# Patient Record
Sex: FEMALE | Race: WHITE | NOT HISPANIC OR LATINO | Employment: FULL TIME | URBAN - METROPOLITAN AREA
[De-identification: names, ages, dates, MRNs, and addresses within clinical notes are randomized per-mention and may not be internally consistent; named-entity substitution may affect disease eponyms.]

---

## 2017-02-17 ENCOUNTER — HOSPITAL ENCOUNTER (OUTPATIENT)
Dept: LAB | Facility: MEDICAL CENTER | Age: 57
End: 2017-02-17
Attending: NURSE PRACTITIONER
Payer: COMMERCIAL

## 2017-02-17 DIAGNOSIS — E03.8 OTHER SPECIFIED HYPOTHYROIDISM: ICD-10-CM

## 2017-02-17 DIAGNOSIS — R53.83 OTHER FATIGUE: ICD-10-CM

## 2017-02-17 DIAGNOSIS — Z00.00 PREVENTATIVE HEALTH CARE: ICD-10-CM

## 2017-02-17 LAB
25(OH)D3 SERPL-MCNC: 10 NG/ML (ref 30–100)
ALBUMIN SERPL BCP-MCNC: 4 G/DL (ref 3.2–4.9)
ALBUMIN/GLOB SERPL: 1.4 G/DL
ALP SERPL-CCNC: 74 U/L (ref 30–99)
ALT SERPL-CCNC: 14 U/L (ref 2–50)
ANION GAP SERPL CALC-SCNC: 7 MMOL/L (ref 0–11.9)
AST SERPL-CCNC: 19 U/L (ref 12–45)
BASOPHILS # BLD AUTO: 0.06 K/UL (ref 0–0.12)
BASOPHILS NFR BLD AUTO: 1 % (ref 0–1.8)
BILIRUB SERPL-MCNC: 0.8 MG/DL (ref 0.1–1.5)
BUN SERPL-MCNC: 15 MG/DL (ref 8–22)
CALCIUM SERPL-MCNC: 9.1 MG/DL (ref 8.5–10.5)
CHLORIDE SERPL-SCNC: 103 MMOL/L (ref 96–112)
CHOLEST SERPL-MCNC: 234 MG/DL (ref 100–199)
CO2 SERPL-SCNC: 28 MMOL/L (ref 20–33)
CORTIS SERPL-MCNC: 8.6 UG/DL (ref 0–23)
CREAT SERPL-MCNC: 0.83 MG/DL (ref 0.5–1.4)
EOSINOPHIL # BLD: 0.14 K/UL (ref 0–0.51)
EOSINOPHIL NFR BLD AUTO: 2.3 % (ref 0–6.9)
ERYTHROCYTE [DISTWIDTH] IN BLOOD BY AUTOMATED COUNT: 42.1 FL (ref 35.9–50)
ESTRADIOL SERPL-MCNC: <20 PG/ML
FERRITIN SERPL-MCNC: 56.3 NG/ML (ref 10–291)
GLOBULIN SER CALC-MCNC: 2.9 G/DL (ref 1.9–3.5)
GLUCOSE SERPL-MCNC: 81 MG/DL (ref 65–99)
HCT VFR BLD AUTO: 48.5 % (ref 37–47)
HDLC SERPL-MCNC: 71 MG/DL
HGB BLD-MCNC: 16.5 G/DL (ref 12–16)
IMM GRANULOCYTES # BLD AUTO: 0.02 K/UL (ref 0–0.11)
IMM GRANULOCYTES NFR BLD AUTO: 0.3 % (ref 0–0.9)
IRON SATN MFR SERPL: 40 % (ref 15–55)
IRON SERPL-MCNC: 144 UG/DL (ref 40–170)
LDLC SERPL CALC-MCNC: 148 MG/DL
LYMPHOCYTES # BLD: 1.97 K/UL (ref 1–4.8)
LYMPHOCYTES NFR BLD AUTO: 31.7 % (ref 22–41)
MCH RBC QN AUTO: 31.8 PG (ref 27–33)
MCHC RBC AUTO-ENTMCNC: 34 G/DL (ref 33.6–35)
MCV RBC AUTO: 93.4 FL (ref 81.4–97.8)
MONOCYTES # BLD: 0.54 K/UL (ref 0–0.85)
MONOCYTES NFR BLD AUTO: 8.7 % (ref 0–13.4)
NEUTROPHILS # BLD: 3.48 K/UL (ref 2–7.15)
NEUTROPHILS NFR BLD AUTO: 56 % (ref 44–72)
NRBC # BLD AUTO: 0 K/UL
NRBC BLD-RTO: 0 /100 WBC
PLATELET # BLD AUTO: 345 K/UL (ref 164–446)
PMV BLD AUTO: 9.7 FL (ref 9–12.9)
POTASSIUM SERPL-SCNC: 4 MMOL/L (ref 3.6–5.5)
PROT SERPL-MCNC: 6.9 G/DL (ref 6–8.2)
RBC # BLD AUTO: 5.19 M/UL (ref 4.2–5.4)
SODIUM SERPL-SCNC: 138 MMOL/L (ref 135–145)
T4 FREE SERPL-MCNC: 1.27 NG/DL (ref 0.53–1.43)
TIBC SERPL-MCNC: 360 UG/DL (ref 250–450)
TRIGL SERPL-MCNC: 73 MG/DL (ref 0–149)
TSH SERPL DL<=0.005 MIU/L-ACNC: 1.78 UIU/ML (ref 0.3–3.7)
WBC # BLD AUTO: 6.2 K/UL (ref 4.8–10.8)

## 2017-02-17 PROCEDURE — 83550 IRON BINDING TEST: CPT

## 2017-02-17 PROCEDURE — 84439 ASSAY OF FREE THYROXINE: CPT

## 2017-02-17 PROCEDURE — 82626 DEHYDROEPIANDROSTERONE: CPT

## 2017-02-17 PROCEDURE — 85025 COMPLETE CBC W/AUTO DIFF WBC: CPT

## 2017-02-17 PROCEDURE — 82670 ASSAY OF TOTAL ESTRADIOL: CPT

## 2017-02-17 PROCEDURE — 82306 VITAMIN D 25 HYDROXY: CPT

## 2017-02-17 PROCEDURE — 36415 COLL VENOUS BLD VENIPUNCTURE: CPT

## 2017-02-17 PROCEDURE — 82728 ASSAY OF FERRITIN: CPT

## 2017-02-17 PROCEDURE — 84443 ASSAY THYROID STIM HORMONE: CPT

## 2017-02-17 PROCEDURE — 80053 COMPREHEN METABOLIC PANEL: CPT

## 2017-02-17 PROCEDURE — 83540 ASSAY OF IRON: CPT

## 2017-02-17 PROCEDURE — 82533 TOTAL CORTISOL: CPT

## 2017-02-17 PROCEDURE — 80061 LIPID PANEL: CPT

## 2017-02-21 ENCOUNTER — APPOINTMENT (OUTPATIENT)
Dept: MEDICAL GROUP | Facility: MEDICAL CENTER | Age: 57
End: 2017-02-21
Payer: COMMERCIAL

## 2017-02-21 LAB — DHEA SERPL-MCNC: 0.52 NG/ML (ref 0.63–4.7)

## 2017-03-07 ENCOUNTER — OFFICE VISIT (OUTPATIENT)
Dept: MEDICAL GROUP | Facility: LAB | Age: 57
End: 2017-03-07
Payer: COMMERCIAL

## 2017-03-07 VITALS
BODY MASS INDEX: 27.52 KG/M2 | DIASTOLIC BLOOD PRESSURE: 80 MMHG | RESPIRATION RATE: 12 BRPM | WEIGHT: 140.2 LBS | SYSTOLIC BLOOD PRESSURE: 140 MMHG | TEMPERATURE: 99.3 F | HEART RATE: 75 BPM | HEIGHT: 60 IN | OXYGEN SATURATION: 97 %

## 2017-03-07 DIAGNOSIS — Z01.419 ENCOUNTER FOR GYNECOLOGICAL EXAMINATION: ICD-10-CM

## 2017-03-07 DIAGNOSIS — E03.8 OTHER SPECIFIED HYPOTHYROIDISM: ICD-10-CM

## 2017-03-07 DIAGNOSIS — E55.9 VITAMIN D DEFICIENCY: ICD-10-CM

## 2017-03-07 PROCEDURE — 99396 PREV VISIT EST AGE 40-64: CPT | Performed by: NURSE PRACTITIONER

## 2017-03-07 NOTE — MR AVS SNAPSHOT
Jess Root   3/7/2017 10:40 AM   Office Visit   MRN: [id number]    Department:  [facility]   Dept Phone:  [phone]    Description:  Female : 1960   Provider:  BESSY Dorsey           Reason for Visit     Annual Exam physical and pap    Results lab work      Allergies as of 3/7/2017     Allergen Noted Reactions    Other Misc 10/04/2008       Anesthesia-- pseudocholinesterace      You were diagnosed with     Encounter for gynecological examination   [1970323]       Other specified hypothyroidism   [7629779]       Vitamin D deficiency   [1762296]         Vital Signs     Blood Pressure Pulse Temperature Respirations Height Weight    140/80 mmHg 75 37.4 °C (99.3 °F) 12 1.524 m (5') 63.594 kg (140 lb 3.2 oz)    Body Mass Index Oxygen Saturation                27.38 kg/m2 97%          Basic Information     Date Of Birth Sex Race Ethnicity Preferred Language    1960 Female White Non- English      Problem List              ICD-10-CM Priority Class Noted - Resolved    Hypothyroidism E03.9   2009 - Present    Varicose veins I86.8   2009 - Present    Superficial thrombophlebitis I80.9   2010 - Present    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 Review all of your home medications and newly ordered medications with your provider and/or pharmacist. Follow medication instructions as directed by your provider and/or pharmacist. Please keep your medication list with you and share with your provider. Update the  information when medications are discontinued, doses are changed, or new medications (including over-the-counter products) are added; and carry medication information at all times in the event of emergency situations     Allergies:  OTHER MISC - (reactions not documented)               Medications  Valid as of: March 07, 2017 - 11:45 AM    Generic Name Brand Name Tablet Size Instructions for use    Fluocinonide (Cream) LIDEX 0.05 % Apply  to affected area(s) 2 times a day. For 14 days        Fluticasone Propionate (Suspension) FLONASE 50 MCG/ACT Spray 2 Sprays in nose every day. Each Nostril        Ketoconazole (Cream) NIZORAL 2 % Apply 1 Application to affected area(s) every day.        Levothyroxine Sodium (Tab) SYNTHROID 125 MCG TAKE 1 TABLET BY MOUTH ONCE DAILY MONDAY-FRIDAY AND 1/2 TABLET ONCE DAILY ON SATURDAYAND SUNDAY  GENERIC FOR SYNTHROID        ValACYclovir HCl (Tab) VALTREX 500 MG Take 1 Tab by mouth 2 times a day. Prn outbreak        .                 Medicines prescribed today were sent to:     [id number]  [place] - [address]    [address] [facility] [address]    Phone: [phone] Fax: [phone]    Open 24 Hours?: No      Medication refill instructions:       If your prescription bottle indicates you have medication refills left, it is not necessary to call your provider’s office. Please contact your pharmacy and they will refill your medication.    If your prescription bottle indicates you do not have any refills left, you may request refills at any time through one of the following ways: The online [employer] system (except Urgent Care), by calling your provider’s office, or by asking your pharmacy to contact your provider’s office with a refill request. Medication refills are processed only during regular business hours and may not be available until the next business day. Your provider may request additional information or to have a follow-up visit with you prior to refilling your  medication.   *Please Note: Medication refills are assigned a new Rx number when refilled electronically. Your pharmacy may indicate that no refills were authorized even though a new prescription for the same medication is available at the pharmacy. Please request the medicine by name with the pharmacy before contacting your provider for a refill.        Your To Do List     Future Labs/Procedures Complete By Expires    FREE THYROXINE  As directed 3/7/2018    TSH  As directed 3/7/2018    VITAMIN D,25 HYDROXY  As directed 3/7/2018         MyChart Status: Patient Declined

## 2017-03-07 NOTE — PROGRESS NOTES
Chief Complaint   Patient presents with   • Annual Exam     physical and pap   • Results     lab work       HPI:  Jess is a 56 y.o.  female who presents for annual exam. Generally the patient is feeling good. She has no complaints or concerns. She would like to review her lab work today, stating that she always feels a lot better on higher dosages of thyroid medication. She is currently taking 125 µg levothyroxine 5 days per week, one half tablet 2 days per week. She describes a mental fog unless she is taking on 137 µg of levothyroxine. She denies constipation, hair or skin changes. She's not on any hormone placement therapy but requested to have her DHEA level checked after reading that she should take this over-the-counter postmenopausally.  Regarding her health maintenance:   Last pap: prior to 2006 - complete hysterectomy due to excessive uterine bleeding  Abnormal Pap hx: a few in her past, not in the past 15-20 yrs  Last Mammo: due, plans on scheduling but has been delayed because of a torn meniscus  Last colonoscopy: As above, plans on scheduling that has been delayed  Bone density test:N\A   Last Lab: Done, would like to review today  Last Td:current   Influenza vaccination:current   Pneumococcal vaccination:not applicable   Zostavax:not applicable   Hx STD''s: no   Regular exercise: yes      meds:   Current Outpatient Prescriptions   Medication Sig Dispense Refill   • valacyclovir (VALTREX) 500 MG Tab Take 1 Tab by mouth 2 times a day. Prn outbreak 30 Tab 11   • fluticasone (FLONASE) 50 MCG/ACT nasal spray Spray 2 Sprays in nose every day. Each Nostril 1 Bottle 0   • levothyroxine (SYNTHROID) 125 MCG Tab TAKE 1 TABLET BY MOUTH ONCE DAILY MONDAY-FRIDAY AND 1/2 TABLET ONCE DAILY ON SATURDAYAND SUNDAY  GENERIC FOR SYNTHROID 30 Tab 2   • ketoconazole (NIZORAL) 2 % Cream Apply 1 Application to affected area(s) every day. 1 Tube 2   • FLUOCINONIDE 0.05 % EX CREA Apply  to affected area(s) 2 times a day. For 14  days 45 g 0     No current facility-administered medications for this visit.       Allergies: No Known Allergies    family:   Family History   Problem Relation Age of Onset   • Diabetes Mother    • Heart Disease Mother        social hx:   Social History     Social History   • Marital Status: Legally      Spouse Name: N/A   • Number of Children: N/A   • Years of Education: N/A     Occupational History   • Not on file.     Social History Main Topics   • Smoking status: Never Smoker    • Smokeless tobacco: Not on file   • Alcohol Use: No   • Drug Use: No   • Sexual Activity: Not on file      Comment: , two kids     Other Topics Concern   • Not on file     Social History Narrative   • No narrative on file       ROS:  No fever, chills, sweats.   No polydipsia, polyuria, temperature intolerance, significant weight changes   No visual changes, blurred vision.  No chest pain, palpitations, peripheral swelling   No chronic cough, shortness of breath, dyspnea with exertion.   No dysphagia, odynophagia, black or bloody stools.   No abdominal pain, nausea, persistent diarrhea, constipation   No dysuria, hematuria, incontinence. Denies nocturia  No rash, pruritis, pigment changes.   No focal weakness, syncope, headache, confusion, persistent numbness.   All other systems are reviewed and negative.    PHYSICAL EXAMINATION:  Blood pressure 140/80, pulse 75, temperature 37.4 °C (99.3 °F), resp. rate 12, height 1.524 m (5'), weight 63.594 kg (140 lb 3.2 oz), SpO2 97 %.  General appearance:healthy, well developed, well nourished  Psych: alert, no distress, cooperative  Eyes: EOM's normal, pupils equal, round, reactive to light  ENT: Ears: external ears normal to inspection and palpation, TM's clear, Nose/Sinuses: nose shows no deformity, asymmetry, or inflammation  Neck: no asymmetry, masses. Thyroid surgically absent, no masses felt. No neck tenderness.  Lungs:chest symmetric with normal A/P diameter, no chest  deformities noted, normal respiratory rate and rhythm  Cardiovascular:regular rate and rhythm, S1 normal  Breasts: normal in size and symmetry, skin normal, physiologic fibronodularity  Abdomen: umbilicus normal, no masses palpable, no organomegaly  Musculoskeletal:ROM of all joints is normal but she experiences pain with flexion of both knees.  Lymphatic: None significantly enlarged  Skin: no rash, no edema  Neuro: mental status intact, cranial nerves 2-12 intact  Pelvic: external genitalia normal, cervix and uterus surgically absent, adnexa without masses or tenderness, vaginal mucosa atrophic      ASSESSMENT/PLAN:  1.annual physical exam: HCM:   breast exams done.  BSE technique reviewed and patient encouraged to perform self-exam monthly.   Encourage monthly self breast exam  Encourage daily exercise for at least 30 minutes  Recommend mammogram and colonoscopy which she plans on scheduling ASAP. DEXA scan next year, she feels overwhelmed that she  Recommend 1500 mg Calcium with 600 units vit d daily.    Follow-up yearly for annual physical exam, Pap smear is no longer indicated.  Labs done - increase vit D to 3000 IU daily  Hypothyroidism: Okay to slightly increase her replacement by taking 125 µg 6 days per week, one half tablet one day per week and she is willing to recheck her lab work in 6 weeks. Reviewed signs and symptoms of hyperthyroidism with her.  Hyperlipidemia: She completely refuses discussion of a statin. She verbalized understanding of the increased risk of coronary artery disease and early death. She is going to try and increase her fiber intake and is willing to recheck her lipids in 6 months.

## 2017-05-18 RX ORDER — LEVOTHYROXINE SODIUM 0.12 MG/1
TABLET ORAL
Qty: 30 TAB | Refills: 2 | Status: SHIPPED | OUTPATIENT
Start: 2017-05-18 | End: 2017-09-14 | Stop reason: SDUPTHER

## 2017-05-18 NOTE — TELEPHONE ENCOUNTER
Was the patient seen in the last year in this department? Yes     Does patient have an active prescription for medications requested? No     Received Request Via: Pharmacy     Last visit:3/7/17

## 2017-09-14 RX ORDER — LEVOTHYROXINE SODIUM 0.12 MG/1
TABLET ORAL
Qty: 30 TAB | Refills: 2 | Status: SHIPPED | OUTPATIENT
Start: 2017-09-14 | End: 2018-01-22 | Stop reason: SDUPTHER

## 2017-09-20 ENCOUNTER — HOSPITAL ENCOUNTER (OUTPATIENT)
Dept: LAB | Facility: MEDICAL CENTER | Age: 57
End: 2017-09-20
Attending: ORTHOPAEDIC SURGERY
Payer: COMMERCIAL

## 2017-09-20 LAB — HCT VFR BLD AUTO: 45.9 % (ref 37–47)

## 2017-09-20 PROCEDURE — 85014 HEMATOCRIT: CPT

## 2017-09-20 PROCEDURE — 36415 COLL VENOUS BLD VENIPUNCTURE: CPT

## 2018-01-13 ENCOUNTER — OFFICE VISIT (OUTPATIENT)
Dept: URGENT CARE | Facility: PHYSICIAN GROUP | Age: 58
End: 2018-01-13
Payer: COMMERCIAL

## 2018-01-13 VITALS
BODY MASS INDEX: 29.84 KG/M2 | RESPIRATION RATE: 18 BRPM | SYSTOLIC BLOOD PRESSURE: 124 MMHG | WEIGHT: 152 LBS | OXYGEN SATURATION: 92 % | HEART RATE: 101 BPM | DIASTOLIC BLOOD PRESSURE: 84 MMHG | HEIGHT: 60 IN | TEMPERATURE: 98.6 F

## 2018-01-13 DIAGNOSIS — J03.90 TONSILLITIS WITH EXUDATE: ICD-10-CM

## 2018-01-13 PROCEDURE — 99214 OFFICE O/P EST MOD 30 MIN: CPT | Performed by: PHYSICIAN ASSISTANT

## 2018-01-13 RX ORDER — AZITHROMYCIN 250 MG/1
TABLET, FILM COATED ORAL
Qty: 6 TAB | Refills: 0 | Status: SHIPPED | OUTPATIENT
Start: 2018-01-13 | End: 2018-02-08

## 2018-01-13 ASSESSMENT — ENCOUNTER SYMPTOMS
TROUBLE SWALLOWING: 1
WHEEZING: 0
HEADACHES: 1
CARDIOVASCULAR NEGATIVE: 1
SWOLLEN GLANDS: 1
CHILLS: 1
SORE THROAT: 1
GASTROINTESTINAL NEGATIVE: 1
DIZZINESS: 0
SINUS PAIN: 1
MUSCULOSKELETAL NEGATIVE: 1
COUGH: 1
SPUTUM PRODUCTION: 1
FEVER: 1
SHORTNESS OF BREATH: 0

## 2018-01-13 NOTE — LETTER
January 13, 2018         Patient: Jess Root   YOB: 1960   Date of Visit: 1/13/2018           To Whom it May Concern:    Jess Root was seen in my clinic on 1/13/2018. She may return to work on Mon. Jan 15th.    If you have any questions or concerns, please don't hesitate to call.        Sincerely,           Tu Russell P.A.-C.  Electronically Signed

## 2018-01-13 NOTE — PROGRESS NOTES
Subjective:      Jess Root is a 57 y.o. female who presents with Cough (C/o RT side sore throat, runny nose, nasal congestion, bilateral ear pain, productive cough, back discomfort from cough x2 days)            Pharyngitis    This is a new problem. The current episode started in the past 7 days. The problem has been unchanged. The pain is worse on the right side. Associated symptoms include congestion, coughing, ear pain (Bilateral), headaches, swollen glands and trouble swallowing. Pertinent negatives include no shortness of breath. She has tried gargles for the symptoms. The treatment provided mild relief.       PMH:  has a past medical history of HSV-2 infection; Hyperlipidemia; Osteopenia; and Thyroid disease (nodules - thyroidectomy).  MEDS:   Current Outpatient Prescriptions:   •  Homeopathic Products (ZICAM COLD REMEDY PO), Take  by mouth., Disp: , Rfl:   •  levothyroxine (SYNTHROID) 125 MCG Tab, TAKE 1 TABLET BY MOUTH ONCE DAILY MONDAY-FRIDAY AND 1/2 TABLET ONCE DAILY ON SATURDAYAND SUNDAY  GENERIC FOR SYNTHROID, Disp: 30 Tab, Rfl: 2  •  valacyclovir (VALTREX) 500 MG Tab, Take 1 Tab by mouth 2 times a day. Prn outbreak, Disp: 30 Tab, Rfl: 11  •  fluticasone (FLONASE) 50 MCG/ACT nasal spray, Spray 2 Sprays in nose every day. Each Nostril, Disp: 1 Bottle, Rfl: 0  •  ketoconazole (NIZORAL) 2 % Cream, Apply 1 Application to affected area(s) every day., Disp: 1 Tube, Rfl: 2  •  FLUOCINONIDE 0.05 % EX CREA, Apply  to affected area(s) 2 times a day. For 14 days, Disp: 45 g, Rfl: 0  ALLERGIES:   Allergies   Allergen Reactions   • Other Misc      Anesthesia-- pseudocholinesterace     SURGHX:   Past Surgical History:   Procedure Laterality Date   • ABDOMINAL HYSTERECTOMY TOTAL  205   • GYN SURGERY     • THYROIDECTOMY     • VEIN STRIPPING       SOCHX:  reports that she has never smoked. She has never used smokeless tobacco. She reports that she does not drink alcohol or use drugs.  FH: family history includes  Diabetes in her mother; Heart Disease in her mother.      Review of Systems   Constitutional: Positive for chills and fever.   HENT: Positive for congestion, ear pain (Bilateral), sinus pain, sore throat and trouble swallowing.    Respiratory: Positive for cough and sputum production. Negative for shortness of breath and wheezing.    Cardiovascular: Negative.    Gastrointestinal: Negative.    Musculoskeletal: Negative.    Neurological: Positive for headaches. Negative for dizziness.       Medications, Allergies, and current problem list reviewed today in Epic     Objective:     /84   Pulse (!) 101   Temp 37 °C (98.6 °F)   Resp 18   Ht 1.524 m (5')   Wt 68.9 kg (152 lb)   SpO2 92%   BMI 29.69 kg/m²      Physical Exam   Constitutional: She is oriented to person, place, and time. She appears well-developed and well-nourished. No distress.   HENT:   Head: Normocephalic and atraumatic.   Right Ear: Hearing, tympanic membrane and external ear normal.   Left Ear: Hearing, tympanic membrane and external ear normal.   Nose: Nose normal.   Mouth/Throat: Normal dentition. No dental caries. Oropharyngeal exudate, posterior oropharyngeal edema and posterior oropharyngeal erythema present.   Eyes: Conjunctivae are normal. Right eye exhibits no discharge. Left eye exhibits no discharge.   Neck: Normal range of motion. Neck supple.   Cardiovascular: Normal rate, regular rhythm and normal heart sounds.    Pulmonary/Chest: Effort normal and breath sounds normal. No respiratory distress. She has no wheezes.   Lymphadenopathy:        Head (right side): Submandibular adenopathy present.        Head (left side): Submandibular adenopathy present.     She has cervical adenopathy.        Right cervical: No superficial cervical adenopathy present.       Left cervical: No superficial cervical adenopathy present.   Neurological: She is alert and oriented to person, place, and time.   Skin: Skin is warm and dry. She is not  diaphoretic. No cyanosis. Nails show no clubbing.   Psychiatric: She has a normal mood and affect. Her behavior is normal. Judgment and thought content normal.   Nursing note and vitals reviewed.              Assessment/Plan:     1. Tonsillitis with exudate  azithromycin (ZITHROMAX) 250 MG Tab     With sinus component  Take full course of antibiotic  Tylenol and Motrin for fever and pain  OTC meds as discussed including oral decongestant if tolerated  Increase fluids and rest  Nasal spray, nasal wash, Linda pot    Return to clinic or go to ED if symptoms worsen or persist. Indications for ED discussed at length. Patient voices understanding. Follow-up with your primary care provider in 3-5 days. Red flags discussed.    Please note that this dictation was created using voice recognition software. I have made every reasonable attempt to correct obvious errors, but I expect that there are errors of grammar and possibly content that I did not discover before finalizing the note.

## 2018-01-22 RX ORDER — LEVOTHYROXINE SODIUM 0.12 MG/1
TABLET ORAL
Qty: 30 TAB | Refills: 0 | Status: SHIPPED | OUTPATIENT
Start: 2018-01-22 | End: 2018-03-04 | Stop reason: SDUPTHER

## 2018-02-08 ENCOUNTER — OFFICE VISIT (OUTPATIENT)
Dept: URGENT CARE | Facility: PHYSICIAN GROUP | Age: 58
End: 2018-02-08
Payer: COMMERCIAL

## 2018-02-08 ENCOUNTER — APPOINTMENT (OUTPATIENT)
Dept: RADIOLOGY | Facility: IMAGING CENTER | Age: 58
End: 2018-02-08
Attending: NURSE PRACTITIONER
Payer: COMMERCIAL

## 2018-02-08 VITALS
BODY MASS INDEX: 29.8 KG/M2 | HEIGHT: 60 IN | WEIGHT: 151.8 LBS | TEMPERATURE: 99 F | RESPIRATION RATE: 12 BRPM | SYSTOLIC BLOOD PRESSURE: 120 MMHG | OXYGEN SATURATION: 94 % | DIASTOLIC BLOOD PRESSURE: 80 MMHG | HEART RATE: 94 BPM

## 2018-02-08 DIAGNOSIS — R05.3 PERSISTENT COUGH FOR 3 WEEKS OR LONGER: ICD-10-CM

## 2018-02-08 DIAGNOSIS — R05.3 PERSISTENT COUGH FOR 3 WEEKS OR LONGER: Primary | ICD-10-CM

## 2018-02-08 PROCEDURE — 99214 OFFICE O/P EST MOD 30 MIN: CPT | Performed by: NURSE PRACTITIONER

## 2018-02-08 PROCEDURE — 71046 X-RAY EXAM CHEST 2 VIEWS: CPT | Mod: TC | Performed by: NURSE PRACTITIONER

## 2018-02-08 RX ORDER — FLUTICASONE PROPIONATE 50 MCG
2 SPRAY, SUSPENSION (ML) NASAL DAILY
Qty: 16 G | Refills: 1 | Status: SHIPPED | OUTPATIENT
Start: 2018-02-08 | End: 2019-11-19

## 2018-02-08 RX ORDER — AZITHROMYCIN 250 MG/1
TABLET, FILM COATED ORAL
Qty: 6 TAB | Refills: 0 | Status: SHIPPED | OUTPATIENT
Start: 2018-02-08 | End: 2019-11-19

## 2018-02-08 ASSESSMENT — ENCOUNTER SYMPTOMS
COUGH: 1
SORE THROAT: 0
FEVER: 1
NAUSEA: 0
DIARRHEA: 0
SHORTNESS OF BREATH: 1
VOMITING: 0
CHILLS: 1
MYALGIAS: 1
WEAKNESS: 1
SPUTUM PRODUCTION: 1

## 2018-02-08 ASSESSMENT — COPD QUESTIONNAIRES: COPD: 0

## 2018-02-08 NOTE — PROGRESS NOTES
Subjective:      Jess Root is a 57 y.o. female who presents with Cough (headache, upper chest pressure, congestion, phlem, x3 weeks )            Medications, Allergies and Prior Medical Hx reviewed and updated in [facility].with patient/family today         Cough   This is a new problem. The current episode started 1 to 4 weeks ago (4 wks). The cough is productive of sputum. Associated symptoms include chills, a fever, myalgias, nasal congestion and shortness of breath. Pertinent negatives include no ear pain or sore throat. She has tried OTC cough suppressant for the symptoms. The treatment provided no relief. There is no history of asthma, bronchitis, COPD or pneumonia.       Review of Systems   Constitutional: Positive for chills, fever and malaise/fatigue.   HENT: Positive for congestion. Negative for ear discharge, ear pain and sore throat.    Respiratory: Positive for cough, sputum production and shortness of breath.    Gastrointestinal: Negative for diarrhea, nausea and vomiting.   Musculoskeletal: Positive for myalgias.   Neurological: Positive for weakness.          Objective:     /80   Pulse 94   Temp 37.2 °C (99 °F)   Resp 12   Ht 1.524 m (5')   Wt 68.9 kg (151 lb 12.8 oz)   SpO2 94%   BMI 29.65 kg/m²      Physical Exam   Constitutional: She appears well-developed and well-nourished. No distress.   HENT:   Head: Normocephalic and atraumatic.   Right Ear: Tympanic membrane and ear canal normal.   Left Ear: Tympanic membrane and ear canal normal.   Nose: Rhinorrhea present.   Mouth/Throat: Uvula is midline and mucous membranes are normal. No trismus in the jaw. No uvula swelling. Posterior oropharyngeal edema and posterior oropharyngeal erythema present. No oropharyngeal exudate.   Eyes: Conjunctivae are normal. Pupils are equal, round, and reactive to light.   Neck: Neck supple.   Cardiovascular: Normal rate, regular rhythm and normal heart sounds.    Pulmonary/Chest: Effort normal and breath  sounds normal. No respiratory distress. She has no wheezes.   Lymphadenopathy:     She has cervical adenopathy.   Neurological: She is alert.   Skin: Skin is warm and dry. Capillary refill takes less than 2 seconds.   Psychiatric: She has a normal mood and affect. Her behavior is normal.   Vitals reviewed.              Assessment/Plan:     1. Persistent cough for 3 weeks or longer  DX-CHEST-2 VIEWS    fluticasone (FLONASE) 50 MCG/ACT nasal spray    azithromycin (ZITHROMAX) 250 MG Tab       Xray of chest -  Reviewed film and radiology interpretation:   1. No active cardiopulmonary abnormalities are identified.    Rest, Fluids, tylenol, ibuprofen, humidified air, and otc cough meds  Pt will go to the ER for worsening or changing symptoms as discussed, follow-up with your primary care provider or return here if not improving in 7 days   Discharge instructions discussed with pt/family who verbalize understanding and agreement with poc

## 2018-03-05 RX ORDER — LEVOTHYROXINE SODIUM 0.12 MG/1
TABLET ORAL
Qty: 15 TAB | Refills: 0 | Status: SHIPPED | OUTPATIENT
Start: 2018-03-05 | End: 2018-04-05 | Stop reason: SDUPTHER

## 2018-03-05 NOTE — TELEPHONE ENCOUNTER
Was the patient seen in the last year in this department? No 12/16    Does patient have an active prescription for medications requested? No     Received Request Via: Pharmacy

## 2018-04-02 ENCOUNTER — HOSPITAL ENCOUNTER (OUTPATIENT)
Dept: LAB | Facility: MEDICAL CENTER | Age: 58
End: 2018-04-02
Attending: NURSE PRACTITIONER
Payer: COMMERCIAL

## 2018-04-02 DIAGNOSIS — E03.8 OTHER SPECIFIED HYPOTHYROIDISM: ICD-10-CM

## 2018-04-02 DIAGNOSIS — E55.9 VITAMIN D DEFICIENCY: ICD-10-CM

## 2018-04-02 LAB
25(OH)D3 SERPL-MCNC: 21 NG/ML (ref 30–100)
T4 FREE SERPL-MCNC: 1.24 NG/DL (ref 0.53–1.43)
TSH SERPL DL<=0.005 MIU/L-ACNC: 2.74 UIU/ML (ref 0.38–5.33)

## 2018-04-02 PROCEDURE — 84443 ASSAY THYROID STIM HORMONE: CPT

## 2018-04-02 PROCEDURE — 84439 ASSAY OF FREE THYROXINE: CPT

## 2018-04-02 PROCEDURE — 82306 VITAMIN D 25 HYDROXY: CPT

## 2018-04-02 PROCEDURE — 36415 COLL VENOUS BLD VENIPUNCTURE: CPT

## 2018-04-05 RX ORDER — LEVOTHYROXINE SODIUM 0.12 MG/1
TABLET ORAL
Qty: 15 TAB | Refills: 0 | Status: SHIPPED | OUTPATIENT
Start: 2018-04-05 | End: 2018-04-19 | Stop reason: SDUPTHER

## 2018-04-09 ENCOUNTER — TELEPHONE (OUTPATIENT)
Dept: MEDICAL GROUP | Facility: LAB | Age: 58
End: 2018-04-09

## 2018-04-09 NOTE — TELEPHONE ENCOUNTER
----- Message from BESSY Rodas sent at 4/3/2018  5:31 PM PDT -----  Please let stone know that her thyroid lab work looks good.  Vit D is low - ask her to take 2000 IU Vit d daily.

## 2018-04-19 ENCOUNTER — OFFICE VISIT (OUTPATIENT)
Dept: MEDICAL GROUP | Facility: LAB | Age: 58
End: 2018-04-19
Payer: COMMERCIAL

## 2018-04-19 VITALS
RESPIRATION RATE: 12 BRPM | DIASTOLIC BLOOD PRESSURE: 92 MMHG | HEIGHT: 60 IN | TEMPERATURE: 98.9 F | OXYGEN SATURATION: 96 % | WEIGHT: 147 LBS | SYSTOLIC BLOOD PRESSURE: 120 MMHG | BODY MASS INDEX: 28.86 KG/M2 | HEART RATE: 78 BPM

## 2018-04-19 DIAGNOSIS — E55.9 VITAMIN D DEFICIENCY: ICD-10-CM

## 2018-04-19 DIAGNOSIS — E03.8 OTHER SPECIFIED HYPOTHYROIDISM: ICD-10-CM

## 2018-04-19 DIAGNOSIS — Z12.11 SPECIAL SCREENING FOR MALIGNANT NEOPLASM OF COLON: ICD-10-CM

## 2018-04-19 DIAGNOSIS — I83.90 VARICOSE VEIN OF LEG: ICD-10-CM

## 2018-04-19 PROCEDURE — 99214 OFFICE O/P EST MOD 30 MIN: CPT | Performed by: NURSE PRACTITIONER

## 2018-04-19 RX ORDER — LEVOTHYROXINE SODIUM 137 UG/1
TABLET ORAL
Qty: 30 TAB | Refills: 2 | Status: SHIPPED | OUTPATIENT
Start: 2018-04-19 | End: 2018-07-05 | Stop reason: SDUPTHER

## 2018-04-19 ASSESSMENT — PATIENT HEALTH QUESTIONNAIRE - PHQ9: CLINICAL INTERPRETATION OF PHQ2 SCORE: 0

## 2018-04-19 NOTE — PROGRESS NOTES
Chief Complaint   Patient presents with   • Hypothyroidism     lab work    • Orders Needed     mammogram        HPI   Jess is a 57-year-old established female here to follow-up on chronic issues.  #1- Hypothyroidism:  Chronic issue. Requesting to go back to 137 µg pills of levothyroxine, stating that when she was on that dosage, she felt the best that she ever has in her whole life. Currently taking 125 mcg daily 5 d per week, 1/2 2 per week. Denies any history of cardiac arrhythmias. Denies any current issues with chest pain or heart palpitations.   Component      Latest Ref Rng & Units 4/2/2018 4/2/2018 4/2/2018           2:40 PM  2:40 PM  2:40 PM   25-Hydroxy   Vitamin D 25      30 - 100 ng/mL 21 (L)     TSH      0.380 - 5.330 uIU/mL  2.740    Free T-4      0.53 - 1.43 ng/dL   1.24     #2- Vit D def:  Chronic issue. She is taking a vitamin D supplement in her multivitamin but she cannot recall how much. She does not spend a lot of time out in the sun.    #3- painful varicose vein to right patella -   She is requesting a referral to see Dr. Zhu to have a varicose vein removed that she feels appeared after she had an injury in the freezer at work in which she was on a ladder. She did have right knee surgery last fall and is still having pain to her right knee.    Past medical, surgical, family, and social history is reviewed and updated in Epic chart by me today.   Medications and allergies reviewed and updated in Epic chart by me today.     ROS:   As documented in history of present illness above    Exam:  Blood pressure 120/92, pulse 78, temperature 37.2 °C (98.9 °F), resp. rate 12, height 1.524 m (5'), weight 66.7 kg (147 lb), SpO2 96 %.  Constitutional: Alert, no distress, plus 3 vital signs  Skin:  Warm, dry, no rashes invisible areas  Eye: Equal, round and reactive, conjunctiva clear  ENMT: Lips without lesions, good dentition, oropharynx clear    Neck: Trachea midline, no masses. Surgically absent  thyroid, well-healed scar  Respiratory: Unlabored respiration, lungs clear to auscultation, no wheezes, no rhonchi  Cardiovascular: Normal rate and rhythm  Musculoskeletal: She does have a protruding varicose vein overlying her right patella without surrounding erythema.  Psych: Alert, pleasant, well-groomed, normal affect    A/P:  1. Other specified hypothyroidism  -We had a good discussion about complications of overtreating hypothyroidism and she verbalized understanding. Will do a trial of 137 µg 5 days per week, one half of a 137 µg one day per week and then have her check her thyroid lab work in 6-8 weeks every 3 months thereafter.  - levothyroxine (SYNTHROID) 137 MCG Tab; TAKE ONE TABLET BY MOUTH EVERY DAY MONDAY-FRIDAY AND 1/2 TABLET EVERY DAY ON SATURDAY AND SUNDAY  Dispense: 30 Tab; Refill: 2  - TSH; Standing  - FREE THYROXINE; Standing    2. Varicose vein of leg  - REFERRAL TO VASCULAR SURGERY    3. Vitamin D deficiency  -Recommend doubling her vitamin D dosage.    4. Special screening for malignant neoplasm of colon  - REFERRAL TO GI FOR COLONOSCOPY

## 2018-05-14 DIAGNOSIS — R60.0 PEDAL EDEMA: ICD-10-CM

## 2018-05-14 RX ORDER — POTASSIUM CHLORIDE 750 MG/1
10-20 TABLET, FILM COATED, EXTENDED RELEASE ORAL DAILY
Qty: 30 TAB | Refills: 4 | Status: SHIPPED | OUTPATIENT
Start: 2018-05-14 | End: 2019-11-02 | Stop reason: SDUPTHER

## 2018-05-29 DIAGNOSIS — R60.0 PEDAL EDEMA: ICD-10-CM

## 2018-05-29 RX ORDER — FUROSEMIDE 20 MG/1
20 TABLET ORAL
Qty: 30 TAB | Refills: 4 | Status: SHIPPED | OUTPATIENT
Start: 2018-05-29 | End: 2020-06-15

## 2018-05-30 ENCOUNTER — HOSPITAL ENCOUNTER (OUTPATIENT)
Dept: RADIOLOGY | Facility: MEDICAL CENTER | Age: 58
End: 2018-05-30
Attending: ORTHOPAEDIC SURGERY
Payer: COMMERCIAL

## 2018-05-30 DIAGNOSIS — S83.241A ACUTE MEDIAL MENISCUS TEAR OF RIGHT KNEE, INITIAL ENCOUNTER: ICD-10-CM

## 2018-05-30 DIAGNOSIS — M25.561 RIGHT KNEE PAIN, UNSPECIFIED CHRONICITY: ICD-10-CM

## 2018-05-30 PROCEDURE — 93971 EXTREMITY STUDY: CPT | Mod: RT

## 2018-06-21 RX ORDER — VALACYCLOVIR HYDROCHLORIDE 500 MG/1
500 TABLET, FILM COATED ORAL 2 TIMES DAILY
Qty: 60 TAB | Refills: 11 | Status: SHIPPED | OUTPATIENT
Start: 2018-06-21 | End: 2020-02-18

## 2018-07-05 DIAGNOSIS — E03.8 OTHER SPECIFIED HYPOTHYROIDISM: ICD-10-CM

## 2018-07-06 RX ORDER — LEVOTHYROXINE SODIUM 137 UG/1
TABLET ORAL
Qty: 30 TAB | Refills: 2 | Status: SHIPPED | OUTPATIENT
Start: 2018-07-06 | End: 2018-07-19

## 2018-07-06 NOTE — TELEPHONE ENCOUNTER
Was the patient seen in the last year in this department? Yes lov 4/19/18    Does patient have an active prescription for medications requested? No     Received Request Via: Pharmacy

## 2018-07-18 ENCOUNTER — HOSPITAL ENCOUNTER (OUTPATIENT)
Dept: LAB | Facility: MEDICAL CENTER | Age: 58
End: 2018-07-18
Attending: NURSE PRACTITIONER
Payer: COMMERCIAL

## 2018-07-18 DIAGNOSIS — E03.8 OTHER SPECIFIED HYPOTHYROIDISM: ICD-10-CM

## 2018-07-18 LAB
T4 FREE SERPL-MCNC: 1.58 NG/DL (ref 0.53–1.43)
TSH SERPL DL<=0.005 MIU/L-ACNC: 0.4 UIU/ML (ref 0.38–5.33)

## 2018-07-18 PROCEDURE — 36415 COLL VENOUS BLD VENIPUNCTURE: CPT

## 2018-07-18 PROCEDURE — 84443 ASSAY THYROID STIM HORMONE: CPT

## 2018-07-18 PROCEDURE — 84439 ASSAY OF FREE THYROXINE: CPT

## 2018-07-19 ENCOUNTER — TELEPHONE (OUTPATIENT)
Dept: MEDICAL GROUP | Facility: LAB | Age: 58
End: 2018-07-19

## 2018-07-19 RX ORDER — LEVOTHYROXINE SODIUM 0.12 MG/1
125 TABLET ORAL
Qty: 90 TAB | Refills: 0 | Status: SHIPPED | OUTPATIENT
Start: 2018-07-19 | End: 2018-10-15 | Stop reason: SDUPTHER

## 2018-07-19 NOTE — TELEPHONE ENCOUNTER
Patient informed. Patient states that she has been having some heart racing and was asking if she should drop her dose back down

## 2018-07-19 NOTE — TELEPHONE ENCOUNTER
----- Message from Felix Page M.D. sent at 7/19/2018  8:52 AM PDT -----  Please notify patient that her thyroid is slightly overactive.  She should follow-up with Ruchi in 3 months and have her thyroid rechecked at that time.  Felix Page M.D.

## 2018-07-19 NOTE — TELEPHONE ENCOUNTER
Yes, if she is having symptoms of hyperthyroidism, we should drop her dose down.  I have sent in a prescription for levothyroxine 125 mcg daily.  Prescription sent to [facility].  She should follow-up with Ruchi in 2-3 months to have levels rechecked.    Felix Page M.D.

## 2018-07-19 NOTE — TELEPHONE ENCOUNTER
Pt calling to confirm her mediation. She was confused with the dose change. It was explained, pt confirmed understanding.

## 2018-10-15 RX ORDER — LEVOTHYROXINE SODIUM 0.12 MG/1
125 TABLET ORAL
Qty: 90 TAB | Refills: 0 | Status: SHIPPED | OUTPATIENT
Start: 2018-10-15 | End: 2018-11-07 | Stop reason: SDUPTHER

## 2018-10-30 ENCOUNTER — TELEPHONE (OUTPATIENT)
Dept: MEDICAL GROUP | Facility: LAB | Age: 58
End: 2018-10-30

## 2018-10-30 DIAGNOSIS — N39.0 ACUTE UTI: ICD-10-CM

## 2018-10-30 RX ORDER — NITROFURANTOIN 25; 75 MG/1; MG/1
100 CAPSULE ORAL 2 TIMES DAILY
Qty: 10 CAP | Refills: 0 | Status: SHIPPED | OUTPATIENT
Start: 2018-10-30 | End: 2018-11-04

## 2018-10-30 NOTE — TELEPHONE ENCOUNTER
1. Caller Name: Jess Root                                           Call Back Number: [phone]      Patient approves a detailed voicemail message: yes    2. What are the patient's symptoms (location & severity)?pressure and  Urgency/no fever, blood. No pain thinks has a UTI (never had one before) the AZO test was positive    3. Is this a new symptom Yes    4. When did it start? 2 days ago

## 2018-11-06 ENCOUNTER — HOSPITAL ENCOUNTER (OUTPATIENT)
Dept: LAB | Facility: MEDICAL CENTER | Age: 58
End: 2018-11-06
Attending: NURSE PRACTITIONER
Payer: COMMERCIAL

## 2018-11-06 ENCOUNTER — OFFICE VISIT (OUTPATIENT)
Dept: MEDICAL GROUP | Facility: LAB | Age: 58
End: 2018-11-06
Payer: COMMERCIAL

## 2018-11-06 ENCOUNTER — HOSPITAL ENCOUNTER (OUTPATIENT)
Facility: MEDICAL CENTER | Age: 58
End: 2018-11-06
Attending: NURSE PRACTITIONER
Payer: COMMERCIAL

## 2018-11-06 VITALS
HEART RATE: 72 BPM | TEMPERATURE: 98.6 F | BODY MASS INDEX: 28.47 KG/M2 | OXYGEN SATURATION: 97 % | DIASTOLIC BLOOD PRESSURE: 82 MMHG | RESPIRATION RATE: 12 BRPM | WEIGHT: 145 LBS | SYSTOLIC BLOOD PRESSURE: 132 MMHG | HEIGHT: 60 IN

## 2018-11-06 DIAGNOSIS — E03.8 OTHER SPECIFIED HYPOTHYROIDISM: ICD-10-CM

## 2018-11-06 DIAGNOSIS — R30.0 DYSURIA: ICD-10-CM

## 2018-11-06 LAB
APPEARANCE UR: CLEAR
BILIRUB UR STRIP-MCNC: NEGATIVE MG/DL
COLOR UR AUTO: YELLOW
GLUCOSE UR STRIP.AUTO-MCNC: NEGATIVE MG/DL
KETONES UR STRIP.AUTO-MCNC: NEGATIVE MG/DL
LEUKOCYTE ESTERASE UR QL STRIP.AUTO: NORMAL
NITRITE UR QL STRIP.AUTO: NEGATIVE
PH UR STRIP.AUTO: 6 [PH] (ref 5–8)
PROT UR QL STRIP: NEGATIVE MG/DL
RBC UR QL AUTO: NEGATIVE
SP GR UR STRIP.AUTO: 1.02
T4 FREE SERPL-MCNC: 1.6 NG/DL (ref 0.53–1.43)
TSH SERPL DL<=0.005 MIU/L-ACNC: 0.91 UIU/ML (ref 0.38–5.33)
UROBILINOGEN UR STRIP-MCNC: 0.2 MG/DL

## 2018-11-06 PROCEDURE — 36415 COLL VENOUS BLD VENIPUNCTURE: CPT

## 2018-11-06 PROCEDURE — 81002 URINALYSIS NONAUTO W/O SCOPE: CPT | Performed by: NURSE PRACTITIONER

## 2018-11-06 PROCEDURE — 84439 ASSAY OF FREE THYROXINE: CPT

## 2018-11-06 PROCEDURE — 87086 URINE CULTURE/COLONY COUNT: CPT

## 2018-11-06 PROCEDURE — 84443 ASSAY THYROID STIM HORMONE: CPT

## 2018-11-06 PROCEDURE — 99214 OFFICE O/P EST MOD 30 MIN: CPT | Performed by: NURSE PRACTITIONER

## 2018-11-06 RX ORDER — SULFAMETHOXAZOLE AND TRIMETHOPRIM 800; 160 MG/1; MG/1
1 TABLET ORAL 2 TIMES DAILY
Qty: 10 TAB | Refills: 0 | Status: SHIPPED | OUTPATIENT
Start: 2018-11-06 | End: 2018-11-11

## 2018-11-06 RX ORDER — SULFAMETHOXAZOLE AND TRIMETHOPRIM 800; 160 MG/1; MG/1
1 TABLET ORAL EVERY 12 HOURS
Qty: 28 TAB | Refills: 0 | Status: SHIPPED | OUTPATIENT
Start: 2018-11-06 | End: 2018-11-06

## 2018-11-06 NOTE — PROGRESS NOTES
Chief Complaint   Patient presents with   • Dysuria       HPI:  Jess is a 59 yo est female here with 2 reasons:  #1-possible UTI: Symptom onset: 10 days ago   Current symptoms: Painful, urgent, frequent voids. No blood noted in urine.  Since onset symptoms are: Slightly improved with Macrobid 100 mg twice daily, completed yesterday but unfortunately she still has flank discomfort, suprapubic aching and dysuria.  Treatments tried: OTC antispasm med.  Associated symptoms: Negative for fever, nausea and vomiting, vaginal discharge, pelvic pain.  Positive for fatigue and feeling foggy headed.  History is negative for frequent UTI.     #2-hypothyroidism: Chronic issue.  Currently taking 125 mcg of levothyroxine.  Tells me numerous times that she feels much better on higher dosages of thyroid replacement including more ease with weight loss, improved energy and better bowel movements.  Her last TSH was 0.400 and T4 was 1.58 in July, she is due for repeat.  She denies any heart palpitations.    ROS:  Denies fever, chills, vomiting.     OBJECTIVE:  Blood pressure 132/82, pulse 72, temperature 37 °C (98.6 °F), resp. rate 12, height 1.524 m (5'), weight 65.8 kg (145 lb), SpO2 97 %.  Gen: Alert, NAD.  Chest: Lungs clear to auscultation, CV RRR.  Abdomen: Soft, tender in suprapubic region. No CVAT. Normal bowel sounds.     Lab Results   Component Value Date    POCCOLOR yellow 11/15/2012    POCAPPEAR hazy 11/15/2012    POCLEUKEST trace 11/15/2012    POCNITRITE negative 11/15/2012    POCUROBILIGE negative 11/15/2012    POCPROTEIN neative 11/15/2012    POCURPH 6.5 11/15/2012    POCBLOOD < trace 11/15/2012    POCSPGRV 1.020 11/15/2012    POCKETONES negative 11/15/2012    POCBILIRUBIN negtive 11/15/2012    POCGLUCUA negative 11/15/2012          ASSESSMENT/PLAN:   1. Dysuria  -Urinalysis positive only for trace leukocytes and possible trace blood.  A culture was sent as she has already completed a course of Macrobid.  She was  started on Bactrim DS twice daily in case of bacterial resistance and I will follow-up with her regarding how she is feeling and culture results within 72 hours.  Encouraged her to increase her water intake.  She is not sexually active.  We discussed voiding frequently.  She was given a few days off of work, stating it is very hard for her to have good bladder hygiene at work.  - URINE CULTURE(NEW); Future  - POCT Urinalysis    2. Other specified hypothyroidism  -Recommend a recheck of her thyroid labs today in the office.  - TSH; Future  - FREE THYROXINE; Future

## 2018-11-06 NOTE — LETTER
November 6, 2018       Patient: Jess Root   YOB: 1960   Date of Visit: 11/6/2018         To Whom It May Concern:    It is my medical opinion that Jess Root remain out of work until Saturday 11/10/2018 due to illness.      If you have any questions or concerns, please don't hesitate to call [phone]          Sincerely,          JANET Rodas.P.TROY.  Electronically Signed

## 2018-11-07 ENCOUNTER — TELEPHONE (OUTPATIENT)
Dept: MEDICAL GROUP | Facility: LAB | Age: 58
End: 2018-11-07

## 2018-11-07 DIAGNOSIS — R30.0 DYSURIA: ICD-10-CM

## 2018-11-07 RX ORDER — LEVOTHYROXINE SODIUM 0.12 MG/1
TABLET ORAL
Qty: 90 TAB | Refills: 1
Start: 2018-11-07 | End: 2018-11-09 | Stop reason: SDUPTHER

## 2018-11-07 NOTE — TELEPHONE ENCOUNTER
----- Message from BESSY Rodas sent at 11/7/2018  6:54 AM PST -----  Please let Jess know that she is still slightly overmedicated - I would like her to take 1/2 of her 125 mcg levothyroxine once per week - ok to continue the whole pill the other 6 days.  Let's recheck this in 3 months

## 2018-11-09 LAB
BACTERIA UR CULT: NORMAL
SIGNIFICANT IND 70042: NORMAL
SITE SITE: NORMAL
SOURCE SOURCE: NORMAL

## 2018-11-09 RX ORDER — LEVOTHYROXINE SODIUM 0.12 MG/1
TABLET ORAL
Qty: 90 TAB | Refills: 1 | Status: SHIPPED | OUTPATIENT
Start: 2018-11-09 | End: 2019-05-13 | Stop reason: SDUPTHER

## 2018-11-09 NOTE — TELEPHONE ENCOUNTER
Patient notified. She states that she took the AZO and also feels like a kidney stone came out in her urine and she is now feeling better.    Would also like to have levothyroxine prescribed for 125 because the 137 makes her heart race

## 2018-11-09 NOTE — TELEPHONE ENCOUNTER
----- Message from BESSY Rodas sent at 11/9/2018  7:31 AM PST -----  Please let Jess know that she does not have a UTI. Ok to stop antibiotics.

## 2019-02-08 ENCOUNTER — OFFICE VISIT (OUTPATIENT)
Dept: URGENT CARE | Facility: PHYSICIAN GROUP | Age: 59
End: 2019-02-08
Payer: COMMERCIAL

## 2019-02-08 VITALS
DIASTOLIC BLOOD PRESSURE: 82 MMHG | HEART RATE: 160 BPM | WEIGHT: 146 LBS | OXYGEN SATURATION: 97 % | SYSTOLIC BLOOD PRESSURE: 150 MMHG | BODY MASS INDEX: 28.66 KG/M2 | HEIGHT: 60 IN | TEMPERATURE: 101.2 F

## 2019-02-08 DIAGNOSIS — R50.9 FEVER, UNSPECIFIED FEVER CAUSE: ICD-10-CM

## 2019-02-08 DIAGNOSIS — R05.9 COUGH: Primary | ICD-10-CM

## 2019-02-08 DIAGNOSIS — Z86.19 HISTORY OF CANDIDIASIS: ICD-10-CM

## 2019-02-08 DIAGNOSIS — J98.01 BRONCHOSPASM, ACUTE: ICD-10-CM

## 2019-02-08 LAB
FLUAV+FLUBV AG SPEC QL IA: NEGATIVE
INT CON NEG: NORMAL
INT CON NEG: NORMAL
INT CON POS: NORMAL
INT CON POS: NORMAL
S PYO AG THROAT QL: NEGATIVE

## 2019-02-08 PROCEDURE — 87804 INFLUENZA ASSAY W/OPTIC: CPT | Performed by: PHYSICIAN ASSISTANT

## 2019-02-08 PROCEDURE — 99214 OFFICE O/P EST MOD 30 MIN: CPT | Performed by: PHYSICIAN ASSISTANT

## 2019-02-08 PROCEDURE — 87880 STREP A ASSAY W/OPTIC: CPT | Performed by: PHYSICIAN ASSISTANT

## 2019-02-08 RX ORDER — CODEINE PHOSPHATE/GUAIFENESIN 10-100MG/5
10 LIQUID (ML) ORAL 4 TIMES DAILY PRN
Qty: 200 ML | Refills: 0 | Status: SHIPPED | OUTPATIENT
Start: 2019-02-08 | End: 2019-02-13

## 2019-02-08 RX ORDER — FLUCONAZOLE 150 MG/1
150 TABLET ORAL
Qty: 2 TAB | Refills: 0 | Status: SHIPPED | OUTPATIENT
Start: 2019-02-08 | End: 2019-11-19

## 2019-02-08 RX ORDER — ALBUTEROL SULFATE 90 UG/1
2 AEROSOL, METERED RESPIRATORY (INHALATION) EVERY 4 HOURS PRN
Qty: 1 INHALER | Refills: 0 | Status: SHIPPED | OUTPATIENT
Start: 2019-02-08 | End: 2019-02-28 | Stop reason: SDUPTHER

## 2019-02-08 RX ORDER — DOXYCYCLINE HYCLATE 100 MG
100 TABLET ORAL 2 TIMES DAILY
Qty: 20 TAB | Refills: 0 | Status: SHIPPED | OUTPATIENT
Start: 2019-02-08 | End: 2019-02-18

## 2019-02-08 RX ORDER — IBUPROFEN 200 MG
600 TABLET ORAL ONCE
Status: COMPLETED | OUTPATIENT
Start: 2019-02-08 | End: 2019-02-08

## 2019-02-08 RX ORDER — PREDNISONE 20 MG/1
20 TABLET ORAL DAILY
Qty: 7 TAB | Refills: 0 | Status: SHIPPED | OUTPATIENT
Start: 2019-02-08 | End: 2019-02-15

## 2019-02-08 RX ADMIN — Medication 600 MG: at 19:22

## 2019-02-08 NOTE — LETTER
February 8, 2019         Patient: Jess Root   YOB: 1960   Date of Visit: 2/8/2019           To Whom it May Concern:    Jess Root was seen in my clinic on 2/8/2019 for an illness that began 02/06/2019. She may return to work on 02/11/2019.    If you have any questions or concerns, please don't hesitate to call.        Sincerely,           Jaki Antony P.A.-C.  Electronically Signed

## 2019-02-09 NOTE — PROGRESS NOTES
Subjective:      Jess Root is a 58 y.o. female who presents with Cough (fever, body aches, phlem (green), x4 days )    PMH:  has a past medical history of HSV-2 infection; Hyperlipidemia; Osteopenia; and Thyroid disease (nodules - thyroidectomy).  MEDS:   Current Outpatient Prescriptions:   •  doxycycline (VIBRAMYCIN) 100 MG Tab, Take 1 Tab by mouth 2 times a day for 10 days., Disp: 20 Tab, Rfl: 0  •  albuterol 108 (90 Base) MCG/ACT Aero Soln inhalation aerosol, Inhale 2 Puffs by mouth every four hours as needed., Disp: 1 Inhaler, Rfl: 0  •  predniSONE (DELTASONE) 20 MG Tab, Take 1 Tab by mouth every day for 7 days., Disp: 7 Tab, Rfl: 0  •  fluconazole (DIFLUCAN) 150 MG tablet, Take 1 Tab by mouth every 7 days., Disp: 2 Tab, Rfl: 0  •  levothyroxine (SYNTHROID) 125 MCG Tab, 125 mcg 6 d per week. 1/2 pill one d per week, Disp: 90 Tab, Rfl: 1  •  valACYclovir (VALTREX) 500 MG Tab, Take 1 Tab by mouth 2 times a day. Prn outbreak, Disp: 60 Tab, Rfl: 11  •  furosemide (LASIX) 20 MG Tab, Take 1 Tab by mouth 1 time daily as needed (fluid retention)., Disp: 30 Tab, Rfl: 4  •  potassium chloride ER (KLOR-CON) 10 MEQ tablet, Take 1-2 Tabs by mouth every day., Disp: 30 Tab, Rfl: 4  •  Homeopathic Products (ZICAM COLD REMEDY PO), Take  by mouth., Disp: , Rfl:   •  fluticasone (FLONASE) 50 MCG/ACT nasal spray, Spray 2 Sprays in nose every day., Disp: 16 g, Rfl: 1  •  azithromycin (ZITHROMAX) 250 MG Tab, Take as directed, Disp: 6 Tab, Rfl: 0  •  fluticasone (FLONASE) 50 MCG/ACT nasal spray, Spray 2 Sprays in nose every day. Each Nostril, Disp: 1 Bottle, Rfl: 0  •  ketoconazole (NIZORAL) 2 % Cream, Apply 1 Application to affected area(s) every day., Disp: 1 Tube, Rfl: 2  •  FLUOCINONIDE 0.05 % EX CREA, Apply  to affected area(s) 2 times a day. For 14 days, Disp: 45 g, Rfl: 0  ALLERGIES:   Allergies   Allergen Reactions   • Other Misc      Anesthesia-- pseudocholinesterace     SURGHX:   Past Surgical History:   Procedure  Laterality Date   • ABDOMINAL HYSTERECTOMY TOTAL  205   • GYN SURGERY     • MENISCUS REPAIR      right knee 9/2017 - Dr. Johnson   • THYROIDECTOMY     • VEIN STRIPPING       SOCHX:  reports that she has never smoked. She has never used smokeless tobacco. She reports that she does not drink alcohol or use drugs.  FH: Reviewed with patient, not pertinent to this visit.           Patient presents with:  Cough: fever, body aches, phlegm (green), x4 days         URI    This is a new problem. The current episode started in the past 7 days. The problem has been gradually worsening. The maximum temperature recorded prior to her arrival was 101 - 101.9 F. The fever has been present for 1 to 2 days. Associated symptoms include congestion, coughing, ear pain, headaches, sinus pain, a sore throat and wheezing. Pertinent negatives include no swollen glands. She has tried decongestant, increased fluids, NSAIDs and steam for the symptoms. The treatment provided no relief.       Review of Systems   Constitutional: Positive for fever. Negative for chills.   HENT: Positive for congestion, ear pain, sinus pain and sore throat.    Respiratory: Positive for cough, sputum production and wheezing.    Cardiovascular: Negative for leg swelling.   Genitourinary: Negative.    Musculoskeletal: Negative for myalgias.   Neurological: Positive for headaches.   All other systems reviewed and are negative.         Objective:     /82 (BP Location: Right arm, Patient Position: Sitting, BP Cuff Size: Adult)   Pulse (!) 160   Temp (!) 38.4 °C (101.2 °F) (Temporal)   Ht 1.524 m (5')   Wt 66.2 kg (146 lb)   SpO2 97%   BMI 28.51 kg/m²      Physical Exam   Constitutional: She is oriented to person, place, and time. She appears well-developed and well-nourished. No distress.   HENT:   Head: Normocephalic and atraumatic.   Right Ear: Tympanic membrane normal.   Left Ear: Tympanic membrane normal.   Nose: Mucosal edema and rhinorrhea present. Right  sinus exhibits maxillary sinus tenderness. Left sinus exhibits maxillary sinus tenderness.   Eyes: Pupils are equal, round, and reactive to light. Conjunctivae and EOM are normal.   Neck: Normal range of motion. Neck supple.   Cardiovascular: Regular rhythm and normal heart sounds.  Tachycardia present.    Pulmonary/Chest: Effort normal. Tachypnea noted. No respiratory distress. She has wheezes. She has rhonchi. She has no rales.   Abdominal: Soft.   Musculoskeletal: Normal range of motion.   Neurological: She is alert and oriented to person, place, and time. Gait normal.   Skin: Skin is warm and dry. Capillary refill takes less than 2 seconds.   Psychiatric: She has a normal mood and affect.   Nursing note and vitals reviewed.         Flu:neg  Strep:neg     Assessment/Plan:     1. Cough  POCT Rapid Strep A    POCT Influenza A/B    ibuprofen (MOTRIN) tablet 600 mg    doxycycline (VIBRAMYCIN) 100 MG Tab    albuterol 108 (90 Base) MCG/ACT Aero Soln inhalation aerosol    guaifenesin-codeine (TUSSI-ORGANIDIN NR) 100-10 MG/5ML syrup    predniSONE (DELTASONE) 20 MG Tab    fluconazole (DIFLUCAN) 150 MG tablet   2. Fever, unspecified fever cause  POCT Rapid Strep A    POCT Influenza A/B    ibuprofen (MOTRIN) tablet 600 mg    doxycycline (VIBRAMYCIN) 100 MG Tab    albuterol 108 (90 Base) MCG/ACT Aero Soln inhalation aerosol    guaifenesin-codeine (TUSSI-ORGANIDIN NR) 100-10 MG/5ML syrup    predniSONE (DELTASONE) 20 MG Tab    fluconazole (DIFLUCAN) 150 MG tablet   3. Bronchospasm, acute  POCT Rapid Strep A    POCT Influenza A/B    ibuprofen (MOTRIN) tablet 600 mg    doxycycline (VIBRAMYCIN) 100 MG Tab    albuterol 108 (90 Base) MCG/ACT Aero Soln inhalation aerosol    guaifenesin-codeine (TUSSI-ORGANIDIN NR) 100-10 MG/5ML syrup    predniSONE (DELTASONE) 20 MG Tab    fluconazole (DIFLUCAN) 150 MG tablet   4. History of candidiasis  POCT Rapid Strep A    POCT Influenza A/B    ibuprofen (MOTRIN) tablet 600 mg    doxycycline  (VIBRAMYCIN) 100 MG Tab    albuterol 108 (90 Base) MCG/ACT Aero Soln inhalation aerosol    guaifenesin-codeine (TUSSI-ORGANIDIN NR) 100-10 MG/5ML syrup    predniSONE (DELTASONE) 20 MG Tab    fluconazole (DIFLUCAN) 150 MG tablet     Discussed with patient this may be influenza, despite negative flu test.  She is outside the window for treatment with antivirals at this point anyway.      This is likely a viral illness but will prescribe an antibiotic to take on a contingent basis if not improving in a day or two.      PT instructed not to drive or operate heavy machinery or drink alcohol while taking this medication because it contains either a narcotic or benzodiazepines which causes drowsiness. PT verbalized understanding of these instructions.     [facility] Aware web site evaluation: I have obtained and reviewed patient utilization report from [facility] pharmacy database prior to writing prescription for controlled substance.  No history of abuse.      PT should follow up with PCP in 1-2 days for re-evaluation if symptoms have not improved.  Discussed red flags and reasons to return to UC or ED.  Pt and/or family verbalized understanding of diagnosis and follow up instructions and was offered informational handout on diagnosis.  PT discharged.

## 2019-02-13 ENCOUNTER — OFFICE VISIT (OUTPATIENT)
Dept: MEDICAL GROUP | Facility: LAB | Age: 59
End: 2019-02-13
Payer: COMMERCIAL

## 2019-02-13 VITALS
TEMPERATURE: 96.5 F | BODY MASS INDEX: 27.88 KG/M2 | SYSTOLIC BLOOD PRESSURE: 130 MMHG | DIASTOLIC BLOOD PRESSURE: 92 MMHG | RESPIRATION RATE: 14 BRPM | HEART RATE: 80 BPM | HEIGHT: 60 IN | OXYGEN SATURATION: 97 % | WEIGHT: 142 LBS

## 2019-02-13 DIAGNOSIS — J06.9 ACUTE URI: ICD-10-CM

## 2019-02-13 PROCEDURE — 99213 OFFICE O/P EST LOW 20 MIN: CPT | Performed by: NURSE PRACTITIONER

## 2019-02-13 ASSESSMENT — PATIENT HEALTH QUESTIONNAIRE - PHQ9: CLINICAL INTERPRETATION OF PHQ2 SCORE: 0

## 2019-02-13 NOTE — LETTER
February 13, 2019       Patient: Jess Root   YOB: 1960   Date of Visit: 2/13/2019         To Whom It May Concern:    Please excuse Jess Root from work until next Wednesday 2/20/2019 due to illness.      If you have any questions or concerns, please don't hesitate to call [phone]          Sincerely,          JANET Rodas.P.TROY.  Electronically Signed

## 2019-02-13 NOTE — PROGRESS NOTES
"Chief Complaint   Patient presents with   • Laryngitis   • Cough     X 1 week       HPI    Jess is a 58-year-old established female here to follow-up on her recent urgent care visit for upper respiratory tract infection symptoms.  Went to  last Friday b/c of fever, cough, throat tickle and not feeling well.   Had temp of 101.2  - negative influenza / strep testing.  She was rx doxy, prednisone, cough syrup and albuterol.  Now has persistent coughing but \"it's clearing up.\"  Mucus is yellow.  Feeling about 30% better than last Friday.  Now taking OTC cough syrup - delsym - which helps during the day and prescription cough syrup at night.   She still feels very tired, occasionally dizzy and does not feel up to returning to work.  She does work with the general public in a grocery store.  She does not smoke.  No immunocompromised states.      Past medical, surgical, family, and social history is reviewed and updated in Epic chart by me today.   Medications and allergies reviewed and updated in Epic chart by me today.     ROS:   Denies n/v/d or abdominal pain.     Exam:  Blood pressure 130/92, pulse 80, temperature 35.8 °C (96.5 °F), resp. rate 14, height 1.524 m (5'), weight 64.4 kg (142 lb), SpO2 97 %.    Constitutional: Alert, no distress, plus 3 vital signs  Skin:  Warm, dry, no rashes invisible areas  Eye: Equal, round and reactive, conjunctiva clear  ENMT: Bilateral tympanic membranes are retracted but translucent without erythema or perforation.  Very mild maxillary sinus tenderness. Oropharynx is slightly injected without exudate. No tonsillar enlargement.    Neck: Mild anterior cervical, nontender lymphadenopathy  Respiratory: Unlabored respiration, lungs clear to auscultation, no wheezes, no rhonchi  Cardiovascular: Normal rate and rhythm  Psych: Alert, pleasant, well-groomed, normal affect    A/P:    1. Acute URI  -encouraged her to finish doxycycline, continue with codeine cough syrup as needed at night, " Delsym during the day, high water intake.  Discussed the importance of deep breathing exercises and when she feels up to it, I encouraged her to take some short walks for exercise.  She will follow-up with me early next week if symptoms have not completely resolved.

## 2019-02-15 ASSESSMENT — ENCOUNTER SYMPTOMS
SWOLLEN GLANDS: 0
SPUTUM PRODUCTION: 1
SINUS PAIN: 1
FEVER: 1
WHEEZING: 1
SORE THROAT: 1
COUGH: 1
CHILLS: 0
MYALGIAS: 0
HEADACHES: 1

## 2019-02-28 DIAGNOSIS — R05.9 COUGH: ICD-10-CM

## 2019-02-28 DIAGNOSIS — Z86.19 HISTORY OF CANDIDIASIS: ICD-10-CM

## 2019-02-28 DIAGNOSIS — R50.9 FEVER, UNSPECIFIED FEVER CAUSE: ICD-10-CM

## 2019-02-28 DIAGNOSIS — J98.01 BRONCHOSPASM, ACUTE: ICD-10-CM

## 2019-02-28 RX ORDER — ALBUTEROL SULFATE 90 UG/1
2 AEROSOL, METERED RESPIRATORY (INHALATION) EVERY 4 HOURS PRN
Qty: 1 INHALER | Refills: 2 | Status: SHIPPED | OUTPATIENT
Start: 2019-02-28 | End: 2020-06-15

## 2019-03-01 NOTE — TELEPHONE ENCOUNTER
Was the patient seen in the last year in this department? Yes  Received this in UC and is requesting a refill from you  Does patient have an active prescription for medications requested? No     Received Request Via: Patient

## 2019-04-29 DIAGNOSIS — B35.3 TINEA PEDIS OF BOTH FEET: ICD-10-CM

## 2019-05-13 RX ORDER — LEVOTHYROXINE SODIUM 0.12 MG/1
TABLET ORAL
Qty: 90 TAB | Refills: 3 | Status: SHIPPED | OUTPATIENT
Start: 2019-05-13 | End: 2020-03-23

## 2019-05-13 NOTE — TELEPHONE ENCOUNTER
Was the patient seen in the last year in this department? Yes  2/13/19  Does patient have an active prescription for medications requested? No     Received Request Via: Pharmacy

## 2019-06-28 DIAGNOSIS — B35.3 TINEA PEDIS OF BOTH FEET: ICD-10-CM

## 2019-10-03 DIAGNOSIS — B35.3 TINEA PEDIS OF BOTH FEET: ICD-10-CM

## 2019-11-02 DIAGNOSIS — R60.0 PEDAL EDEMA: ICD-10-CM

## 2019-11-04 RX ORDER — POTASSIUM CHLORIDE 750 MG/1
TABLET, FILM COATED, EXTENDED RELEASE ORAL
Qty: 30 TAB | Refills: 4 | Status: SHIPPED | OUTPATIENT
Start: 2019-11-04 | End: 2020-03-02

## 2019-11-04 NOTE — TELEPHONE ENCOUNTER
Was the patient seen in the last year in this department? Yes2/13/19    Does patient have an active prescription for medications requested? No     Received Request Via: Pharmacy

## 2019-11-19 ENCOUNTER — OFFICE VISIT (OUTPATIENT)
Dept: URGENT CARE | Facility: PHYSICIAN GROUP | Age: 59
End: 2019-11-19
Payer: COMMERCIAL

## 2019-11-19 VITALS
WEIGHT: 143 LBS | BODY MASS INDEX: 28.07 KG/M2 | HEART RATE: 78 BPM | OXYGEN SATURATION: 97 % | TEMPERATURE: 99.2 F | HEIGHT: 60 IN | SYSTOLIC BLOOD PRESSURE: 108 MMHG | RESPIRATION RATE: 20 BRPM | DIASTOLIC BLOOD PRESSURE: 78 MMHG

## 2019-11-19 DIAGNOSIS — J98.8 RTI (RESPIRATORY TRACT INFECTION): ICD-10-CM

## 2019-11-19 LAB
FLUAV+FLUBV AG SPEC QL IA: NORMAL
INT CON NEG: NEGATIVE
INT CON POS: POSITIVE

## 2019-11-19 PROCEDURE — 99214 OFFICE O/P EST MOD 30 MIN: CPT | Performed by: FAMILY MEDICINE

## 2019-11-19 PROCEDURE — 87804 INFLUENZA ASSAY W/OPTIC: CPT | Performed by: FAMILY MEDICINE

## 2019-11-19 RX ORDER — AZITHROMYCIN 250 MG/1
TABLET, FILM COATED ORAL
Qty: 6 TAB | Refills: 0 | Status: SHIPPED | OUTPATIENT
Start: 2019-11-19 | End: 2019-11-19 | Stop reason: SDUPTHER

## 2019-11-19 RX ORDER — PREDNISONE 10 MG/1
30 TABLET ORAL EVERY MORNING
Qty: 21 TAB | Refills: 0 | Status: SHIPPED | OUTPATIENT
Start: 2019-11-19 | End: 2019-11-19 | Stop reason: SDUPTHER

## 2019-11-19 RX ORDER — AZITHROMYCIN 250 MG/1
TABLET, FILM COATED ORAL
Qty: 6 TAB | Refills: 0 | Status: SHIPPED | OUTPATIENT
Start: 2019-11-19 | End: 2021-03-10

## 2019-11-19 RX ORDER — PREDNISONE 10 MG/1
30 TABLET ORAL EVERY MORNING
Qty: 21 TAB | Refills: 0 | Status: SHIPPED | OUTPATIENT
Start: 2019-11-19 | End: 2019-11-26

## 2019-11-19 RX ORDER — PROMETHAZINE HYDROCHLORIDE AND CODEINE PHOSPHATE 6.25; 1 MG/5ML; MG/5ML
5 SYRUP ORAL 4 TIMES DAILY PRN
Qty: 140 ML | Refills: 0 | Status: SHIPPED | OUTPATIENT
Start: 2019-11-19 | End: 2019-11-26

## 2019-11-19 ASSESSMENT — ENCOUNTER SYMPTOMS
COUGH: 1
CHILLS: 1
SORE THROAT: 1
SINUS PAIN: 1

## 2019-11-19 NOTE — PROGRESS NOTES
Subjective:      Jess Root is a 59 y.o. female who presents with Cold Symptoms (Cough, yellow and green mucus, chills, sore throat x4 days)      - This is a pleasant and non toxic appearing 59 y.o. female with c/o ~3 days w/ some mild body aches, cough and stuffy nose. Has had occasional chill w/ subjective fever. No NV/CP/SOB            ALLERGIES:  Other misc     PMH:  Past Medical History:   Diagnosis Date   • HSV-2 infection    • Hyperlipidemia    • Osteopenia    • Thyroid disease nodules - thyroidectomy        PSH:  Past Surgical History:   Procedure Laterality Date   • ABDOMINAL HYSTERECTOMY TOTAL  205   • GYN SURGERY     • MENISCUS REPAIR      right knee 9/2017 - Dr. Johnson   • THYROIDECTOMY     • VEIN STRIPPING         MEDS:    Current Outpatient Medications:   •  azithromycin (ZITHROMAX) 250 MG Tab, Use as directed, Disp: 6 Tab, Rfl: 0  •  predniSONE (DELTASONE) 10 MG Tab, Take 3 Tabs by mouth every morning for 7 days., Disp: 21 Tab, Rfl: 0  •  promethazine-codeine (PHENERGAN-CODEINE) 6.25-10 MG/5ML Syrup, Take 5 mL by mouth 4 times a day as needed for Cough for up to 7 days., Disp: 140 mL, Rfl: 0  •  potassium chloride ER (KLOR-CON) 10 MEQ tablet, TAKE 1 TO 2 TABLETS BY MOUTH EVERY DAY, Disp: 30 Tab, Rfl: 4  •  fluocinonide (LIDEX) 0.05 % Cream, APPLY TO AFFECTED AREA(S) UP TO TWO TIMES A DAY, Disp: 45 g, Rfl: 0  •  levothyroxine (SYNTHROID) 125 MCG Tab, TAKE ONE TABLET BY MOUTH ONCE DAILY 6 DAYS PER WEEK AND 1/2 TABLET ONE DAY PER WEEK, Disp: 90 Tab, Rfl: 3  •  albuterol 108 (90 Base) MCG/ACT Aero Soln inhalation aerosol, Inhale 2 Puffs by mouth every four hours as needed., Disp: 1 Inhaler, Rfl: 2  •  valACYclovir (VALTREX) 500 MG Tab, Take 1 Tab by mouth 2 times a day. Prn outbreak, Disp: 60 Tab, Rfl: 11  •  furosemide (LASIX) 20 MG Tab, Take 1 Tab by mouth 1 time daily as needed (fluid retention)., Disp: 30 Tab, Rfl: 4    ** I have documented what I find to be significant in regards to past medical,  social, family and surgical history  in my HPI or under PMH/PSH/FH review section, otherwise it is contributory **           HPI    Review of Systems   Constitutional: Positive for chills.   HENT: Positive for congestion, sinus pain and sore throat.    Respiratory: Positive for cough.    All other systems reviewed and are negative.         Objective:     /78   Pulse 78   Temp 37.3 °C (99.2 °F)   Resp 20   Ht 1.524 m (5')   Wt 64.9 kg (143 lb)   SpO2 97%   BMI 27.93 kg/m²      Physical Exam  Vitals signs and nursing note reviewed.   Constitutional:       General: She is not in acute distress.     Appearance: She is well-developed. She is not diaphoretic.   HENT:      Head: Normocephalic and atraumatic.   Eyes:      Conjunctiva/sclera: Conjunctivae normal.   Cardiovascular:      Heart sounds: Normal heart sounds. No murmur.   Pulmonary:      Effort: Pulmonary effort is normal. No respiratory distress.      Breath sounds: Normal breath sounds.   Skin:     General: Skin is warm and dry.   Neurological:      Mental Status: She is alert.      Motor: No abnormal muscle tone.   Psychiatric:         Judgment: Judgment normal.                 Assessment/Plan:           1. RTI (respiratory tract infection)  POCT Influenza A/B    azithromycin (ZITHROMAX) 250 MG Tab    predniSONE (DELTASONE) 10 MG Tab    promethazine-codeine (PHENERGAN-CODEINE) 6.25-10 MG/5ML Syrup       - rest/hydrate       Dx & d/c instructions discussed w/ patient and/or family members.     Follow up with PCP (or here if PCP unavailable) in 2-3 days if symptoms not improving, ER if feeling/getting worse.    Any realistic and/or common medication side effects that may have been given today(i.e. Rash, GI upset/constipation, sedation, elevation of BP or blood sugars) reviewed.     Patient left in stable condition      reviewed if narcotics given

## 2019-11-19 NOTE — LETTER
November 19, 2019         Patient: Jess Root   YOB: 1960   Date of Visit: 11/19/2019           To Whom it May Concern:    Jess Root was seen in my clinic on 11/19/2019. She may return to work in 3-4 days.    If you have any questions or concerns, please don't hesitate to call.        Sincerely,           Luisito Evans M.D.  Electronically Signed

## 2019-12-05 ENCOUNTER — TELEPHONE (OUTPATIENT)
Dept: MEDICAL GROUP | Facility: LAB | Age: 59
End: 2019-12-05

## 2019-12-06 NOTE — TELEPHONE ENCOUNTER
Pt stated she has been hoarse for a week. When she coughs she feels likes she can't get enough air, did get an inhaler from urgent care. Finished her z elisa and prednisone. This has been going on for three weeks still has green/yellow mucus. Also feels like her throat is swollen.

## 2019-12-06 NOTE — TELEPHONE ENCOUNTER
Patient called and LVM stating that she has had a cold for 2 weeks. She was seen in  11/19/19 and has taken 2 rounds of z elisa. She is not improving. I did call her and leave a message for her to call back with more details of her symptoms. She is wondering what she should be doing.

## 2019-12-12 ENCOUNTER — OFFICE VISIT (OUTPATIENT)
Dept: URGENT CARE | Facility: PHYSICIAN GROUP | Age: 59
End: 2019-12-12
Payer: COMMERCIAL

## 2019-12-12 VITALS
TEMPERATURE: 98.8 F | HEIGHT: 60 IN | BODY MASS INDEX: 27.48 KG/M2 | DIASTOLIC BLOOD PRESSURE: 100 MMHG | OXYGEN SATURATION: 98 % | RESPIRATION RATE: 14 BRPM | WEIGHT: 140 LBS | SYSTOLIC BLOOD PRESSURE: 148 MMHG | HEART RATE: 78 BPM

## 2019-12-12 DIAGNOSIS — R05.3 PERSISTENT COUGH: ICD-10-CM

## 2019-12-12 PROCEDURE — 99214 OFFICE O/P EST MOD 30 MIN: CPT | Performed by: PHYSICIAN ASSISTANT

## 2019-12-12 RX ORDER — BENZONATATE 100 MG/1
100-200 CAPSULE ORAL 3 TIMES DAILY PRN
Qty: 60 CAP | Refills: 0 | Status: SHIPPED | OUTPATIENT
Start: 2019-12-12 | End: 2021-03-10

## 2019-12-12 RX ORDER — FLUTICASONE PROPIONATE 50 MCG
1 SPRAY, SUSPENSION (ML) NASAL DAILY
Qty: 16 G | Refills: 0 | Status: SHIPPED | OUTPATIENT
Start: 2019-12-12 | End: 2020-06-16 | Stop reason: SDUPTHER

## 2019-12-12 ASSESSMENT — COPD QUESTIONNAIRES: COPD: 0

## 2019-12-12 ASSESSMENT — ENCOUNTER SYMPTOMS
WHEEZING: 0
FEVER: 0
COUGH: 1
SHORTNESS OF BREATH: 0
DIARRHEA: 0
MUSCULOSKELETAL NEGATIVE: 1
VOMITING: 0
NAUSEA: 0
RHINORRHEA: 1
SORE THROAT: 0
SPUTUM PRODUCTION: 0
HEMOPTYSIS: 0
MYALGIAS: 0
DIZZINESS: 0
HEADACHES: 0
ABDOMINAL PAIN: 0
CHILLS: 0

## 2019-12-12 NOTE — PROGRESS NOTES
Subjective:      Jess Root is a 59 y.o. female who presents with Cough (seen a few weeks ago, not improving)            Cough   This is a new problem. The current episode started 1 to 4 weeks ago (1 month). The problem has been unchanged. The problem occurs every few minutes. The cough is productive of sputum. Associated symptoms include ear congestion, nasal congestion, postnasal drip and rhinorrhea. Pertinent negatives include no chest pain, chills, ear pain, fever, headaches, hemoptysis, myalgias, rash, sore throat, shortness of breath or wheezing. Nothing aggravates the symptoms. She has tried OTC cough suppressant for the symptoms. The treatment provided mild relief. There is no history of asthma, COPD or pneumonia.     Patient presents to urgent care reporting an ongoing persistent cough x 1 month. She was seen in urgent care when symptoms first started and was given azithromycin along with a course of steroids and cough syrup. She was also given an additional course of azithromycin. Her symptoms improved but she reports an ongoing cough that is somewhat productive of clear mucous. No fevers, chills, body aches, chest pain, wheezing, or SOB. No history of chronic lung disease or pneumonia. No history of smoking.     Review of Systems   Constitutional: Negative for chills and fever.   HENT: Positive for congestion, postnasal drip and rhinorrhea. Negative for ear pain and sore throat.    Respiratory: Positive for cough. Negative for hemoptysis, sputum production, shortness of breath and wheezing.    Cardiovascular: Negative for chest pain.   Gastrointestinal: Negative for abdominal pain, diarrhea, nausea and vomiting.   Genitourinary: Negative.    Musculoskeletal: Negative.  Negative for myalgias.   Skin: Negative for rash.   Neurological: Negative for dizziness and headaches.        Objective:     /100   Pulse 78   Temp 37.1 °C (98.8 °F)   Resp 14   Ht 1.524 m (5')   Wt 63.5 kg (140 lb)   SpO2  98%   BMI 27.34 kg/m²      Physical Exam  Vitals signs and nursing note reviewed.   Constitutional:       General: She is not in acute distress.     Appearance: She is well-developed. She is not diaphoretic.   HENT:      Head: Normocephalic and atraumatic.      Right Ear: Hearing, tympanic membrane, ear canal and external ear normal. There is no impacted cerumen.      Left Ear: Hearing, tympanic membrane, ear canal and external ear normal. There is no impacted cerumen.      Nose: Rhinorrhea present. No congestion.      Mouth/Throat:      Mouth: Mucous membranes are moist.      Pharynx: No oropharyngeal exudate or posterior oropharyngeal erythema.   Eyes:      Conjunctiva/sclera: Conjunctivae normal.      Pupils: Pupils are equal, round, and reactive to light.   Neck:      Musculoskeletal: Normal range of motion.   Cardiovascular:      Rate and Rhythm: Normal rate.      Heart sounds: Normal heart sounds. No murmur.   Pulmonary:      Effort: Pulmonary effort is normal.      Breath sounds: Normal breath sounds. No wheezing or rales.   Musculoskeletal: Normal range of motion.   Skin:     General: Skin is warm and dry.   Neurological:      Mental Status: She is alert and oriented to person, place, and time.   Psychiatric:         Behavior: Behavior normal.            PMH:  has a past medical history of HSV-2 infection, Hyperlipidemia, Osteopenia, and Thyroid disease (nodules - thyroidectomy).  MEDS:   Current Outpatient Medications:   •  benzonatate (TESSALON) 100 MG Cap, Take 1-2 Caps by mouth 3 times a day as needed., Disp: 60 Cap, Rfl: 0  •  Cetirizine HCl 10 MG TABLET DISPERSIBLE, Take 1 Tab by mouth every day., Disp: 30 Tab, Rfl: 0  •  fluticasone (FLONASE) 50 MCG/ACT nasal spray, Spray 1 Spray in nose every day., Disp: 16 g, Rfl: 0  •  potassium chloride ER (KLOR-CON) 10 MEQ tablet, TAKE 1 TO 2 TABLETS BY MOUTH EVERY DAY, Disp: 30 Tab, Rfl: 4  •  fluocinonide (LIDEX) 0.05 % Cream, APPLY TO AFFECTED AREA(S) UP TO  TWO TIMES A DAY, Disp: 45 g, Rfl: 0  •  levothyroxine (SYNTHROID) 125 MCG Tab, TAKE ONE TABLET BY MOUTH ONCE DAILY 6 DAYS PER WEEK AND 1/2 TABLET ONE DAY PER WEEK, Disp: 90 Tab, Rfl: 3  •  albuterol 108 (90 Base) MCG/ACT Aero Soln inhalation aerosol, Inhale 2 Puffs by mouth every four hours as needed., Disp: 1 Inhaler, Rfl: 2  •  valACYclovir (VALTREX) 500 MG Tab, Take 1 Tab by mouth 2 times a day. Prn outbreak, Disp: 60 Tab, Rfl: 11  •  furosemide (LASIX) 20 MG Tab, Take 1 Tab by mouth 1 time daily as needed (fluid retention)., Disp: 30 Tab, Rfl: 4  •  azithromycin (ZITHROMAX) 250 MG Tab, Use as directed (Patient not taking: Reported on 12/12/2019), Disp: 6 Tab, Rfl: 0  ALLERGIES:   Allergies   Allergen Reactions   • Other Misc      Anesthesia-- pseudocholinesterace     SURGHX:   Past Surgical History:   Procedure Laterality Date   • ABDOMINAL HYSTERECTOMY TOTAL  205   • GYN SURGERY     • MENISCUS REPAIR      right knee 9/2017 - Dr. Johnson   • THYROIDECTOMY     • VEIN STRIPPING       SOCHX:  reports that she has never smoked. She has never used smokeless tobacco. She reports that she does not drink alcohol or use drugs.  FH: family history includes Diabetes in her mother; Heart Disease in her mother.       Assessment/Plan:       1. Persistent cough  - benzonatate (TESSALON) 100 MG Cap; Take 1-2 Caps by mouth 3 times a day as needed.  Dispense: 60 Cap; Refill: 0  - Cetirizine HCl 10 MG TABLET DISPERSIBLE; Take 1 Tab by mouth every day.  Dispense: 30 Tab; Refill: 0  - fluticasone (FLONASE) 50 MCG/ACT nasal spray; Spray 1 Spray in nose every day.  Dispense: 16 g; Refill: 0    No evidence of residual bacterial respiratory infection. Encouraged increased fluids and rest. Tessalon perles as needed for symptomatic relief along with Zyrtec and Flonase nasal spray. Monitor symptoms closely and RTC or follow up with primary care if symptoms persist/worsen. The patient demonstrated a good understanding and agreed with the  treatment plan.

## 2020-02-18 RX ORDER — VALACYCLOVIR HYDROCHLORIDE 500 MG/1
TABLET, FILM COATED ORAL
Qty: 60 TAB | Refills: 11 | Status: SHIPPED | OUTPATIENT
Start: 2020-02-18 | End: 2022-02-14

## 2020-02-18 NOTE — TELEPHONE ENCOUNTER
Received request via: Pharmacy    Was the patient seen in the last year in this department? NO2/13/19  Does the patient have an active prescription (recently filled or refills available) for medication(s) requested? No

## 2020-03-01 DIAGNOSIS — R60.0 PEDAL EDEMA: ICD-10-CM

## 2020-03-02 ENCOUNTER — TELEPHONE (OUTPATIENT)
Dept: MEDICAL GROUP | Facility: LAB | Age: 60
End: 2020-03-02

## 2020-03-02 RX ORDER — POTASSIUM CHLORIDE 750 MG/1
TABLET, FILM COATED, EXTENDED RELEASE ORAL
Qty: 30 TAB | Refills: 4 | Status: SHIPPED | OUTPATIENT
Start: 2020-03-02 | End: 2021-02-05 | Stop reason: SDUPTHER

## 2020-03-02 NOTE — TELEPHONE ENCOUNTER
[facility] this is pt's first time on this medication. They need clarification on script to when to take 1 verses  2 tabs a day.    potassium chloride ER (KLOR-CON) 10 MEQ tablet

## 2020-03-02 NOTE — TELEPHONE ENCOUNTER
Received request via: Pharmacy    Was the patient seen in the last year in this department? No 2/13/19    Does the patient have an active prescription (recently filled or refills available) for medication(s) requested? No

## 2020-03-23 RX ORDER — LEVOTHYROXINE SODIUM 0.12 MG/1
TABLET ORAL
Qty: 90 TAB | Refills: 3 | Status: SHIPPED | OUTPATIENT
Start: 2020-03-23 | End: 2020-06-15

## 2020-03-23 NOTE — TELEPHONE ENCOUNTER
Received request via: Pharmacy    Was the patient seen in the last year in this department? Yes  2/13/19  Does the patient have an active prescription (recently filled or refills available) for medication(s) requested? No

## 2020-06-13 DIAGNOSIS — R60.0 PEDAL EDEMA: ICD-10-CM

## 2020-06-13 DIAGNOSIS — R50.9 FEVER, UNSPECIFIED FEVER CAUSE: ICD-10-CM

## 2020-06-13 DIAGNOSIS — J98.01 BRONCHOSPASM, ACUTE: ICD-10-CM

## 2020-06-13 DIAGNOSIS — R05.9 COUGH: ICD-10-CM

## 2020-06-13 DIAGNOSIS — Z86.19 HISTORY OF CANDIDIASIS: ICD-10-CM

## 2020-06-15 RX ORDER — ALBUTEROL SULFATE 90 UG/1
AEROSOL, METERED RESPIRATORY (INHALATION)
Qty: 18 G | Refills: 2 | Status: SHIPPED | OUTPATIENT
Start: 2020-06-15 | End: 2021-10-01 | Stop reason: SDUPTHER

## 2020-06-15 RX ORDER — LEVOTHYROXINE SODIUM 0.12 MG/1
TABLET ORAL
Qty: 90 TAB | Refills: 3 | Status: SHIPPED | OUTPATIENT
Start: 2020-06-15 | End: 2021-06-29

## 2020-06-15 RX ORDER — FUROSEMIDE 20 MG/1
TABLET ORAL
Qty: 30 TAB | Refills: 4 | Status: SHIPPED | OUTPATIENT
Start: 2020-06-15 | End: 2021-10-01 | Stop reason: SDUPTHER

## 2020-06-16 DIAGNOSIS — R05.3 PERSISTENT COUGH: ICD-10-CM

## 2020-06-16 RX ORDER — FLUTICASONE PROPIONATE 50 MCG
1 SPRAY, SUSPENSION (ML) NASAL DAILY
Qty: 16 G | Refills: 0 | Status: SHIPPED | OUTPATIENT
Start: 2020-06-16 | End: 2022-02-14

## 2020-08-10 ENCOUNTER — OFFICE VISIT (OUTPATIENT)
Dept: MEDICAL GROUP | Facility: LAB | Age: 60
End: 2020-08-10
Payer: COMMERCIAL

## 2020-08-10 VITALS
HEART RATE: 70 BPM | OXYGEN SATURATION: 95 % | WEIGHT: 137 LBS | TEMPERATURE: 99.1 F | SYSTOLIC BLOOD PRESSURE: 140 MMHG | BODY MASS INDEX: 26.9 KG/M2 | DIASTOLIC BLOOD PRESSURE: 78 MMHG | HEIGHT: 60 IN | RESPIRATION RATE: 16 BRPM

## 2020-08-10 DIAGNOSIS — Z11.59 ENCOUNTER FOR SCREENING FOR OTHER VIRAL DISEASES: ICD-10-CM

## 2020-08-10 DIAGNOSIS — Z00.00 PREVENTATIVE HEALTH CARE: ICD-10-CM

## 2020-08-10 DIAGNOSIS — Z12.11 SPECIAL SCREENING FOR MALIGNANT NEOPLASM OF COLON: ICD-10-CM

## 2020-08-10 DIAGNOSIS — E03.8 OTHER SPECIFIED HYPOTHYROIDISM: ICD-10-CM

## 2020-08-10 DIAGNOSIS — D22.9 ATYPICAL NEVI: ICD-10-CM

## 2020-08-10 DIAGNOSIS — H57.89 RED EYES: ICD-10-CM

## 2020-08-10 DIAGNOSIS — Z12.31 ENCOUNTER FOR SCREENING MAMMOGRAM FOR BREAST CANCER: ICD-10-CM

## 2020-08-10 PROCEDURE — 99214 OFFICE O/P EST MOD 30 MIN: CPT | Performed by: NURSE PRACTITIONER

## 2020-08-10 ASSESSMENT — PATIENT HEALTH QUESTIONNAIRE - PHQ9: CLINICAL INTERPRETATION OF PHQ2 SCORE: 0

## 2020-08-10 NOTE — PROGRESS NOTES
"Chief Complaint   Patient presents with   • Other     skin issues       HPI  Jess is a 60-year-old established female here with concern regarding new skin lesion to left side of face near mouth since wearing a mask over the past few months - \"feels like I scratched myself but won't heal.\"  Hx of a basal cell CA to face 9 yrs ago, removed by Dr. Bah.    Denies left-sided facial pain or bleeding from the lesion.  No other associated symptoms.  Not applying anything over the lesion.    Red eyes:  Itchy periodically.  Requesting refill of lumigan which she buys over-the-counter but was told by the pharmacist that she could get a prescription and potentially it could be less expensive.  Lumigan helps.  Has tried oral Zyrtec and Claritin without relief.  Is not regularly followed by an eye doctor.    Hypothyroidism: Chronic issue.  Overdue for lab work.  Taking 125 mcg levothyroxine every day.  Energy is good.  Denies bowel or bladder problems.    She also thinks that she may have had the coronavirus several months ago as she had a cough for approximately 6 weeks with laryngitis.  Would like antibody testing.    Past medical, surgical, family, and social history is reviewed and updated in Epic chart by me today.   Medications and allergies reviewed and updated in Epic chart by me today.     ROS:   As documented in history of present illness above    Exam:  /78 (BP Location: Right arm, Patient Position: Sitting, BP Cuff Size: Adult)   Pulse 70   Temp 37.3 °C (99.1 °F)   Resp 16   Ht 1.524 m (5')   Wt 62.1 kg (137 lb)   SpO2 95%   Constitutional: Alert, no distress, plus 3 vital signs  Skin:  Warm, dry.  1 cm x 5 mm erythematous linear lesion just lateral to mouth on left side of face with two small leroy appearing lesions inside original lesion.  Eye: Equal, round and reactive, conjunctiva without significant erythema today.  ENMT: Lips without lesions.  Respiratory: Unlabored respiration.  Cardiovascular: " "Normal rate and rhythm.  Psych: Alert, pleasant, well-groomed, normal affect    Assessment / Plan / Medical Decision making:   \"  1. Atypical nevi  REFERRAL TO DERMATOLOGY   2. Red eyes  Brimonidine Tartrate 0.025 % Solution   3. Other specified hypothyroidism     4. Special screening for malignant neoplasm of colon  REFERRAL TO GASTROENTEROLOGY   5. Encounter for screening mammogram for breast cancer  MA-SCREENING MAMMO BILAT W/CAD   6. Encounter for screening for other viral diseases  SARS CoV-2 Ab, Total   7. Preventative health care  Comp Metabolic Panel    CBC WITH DIFFERENTIAL    Lipid Profile    TSH    FREE THYROXINE   \"  Recommend seeing dermatology for further assessment of left-sided facial lesion, referral placed.    Refilled Lumigan eyedrops for red eyes which helped the patient.    Recommend updated TSH and T4.  Continue current thyroid replacement dosage for now.    Ordered antibody testing for COVID-19, requested by patient.    Recommend full updated lab panel.  Recommend mammogram and colonoscopy.  "

## 2020-08-11 ENCOUNTER — HOSPITAL ENCOUNTER (OUTPATIENT)
Dept: LAB | Facility: MEDICAL CENTER | Age: 60
End: 2020-08-11
Attending: NURSE PRACTITIONER
Payer: COMMERCIAL

## 2020-08-11 DIAGNOSIS — Z11.59 ENCOUNTER FOR SCREENING FOR OTHER VIRAL DISEASES: ICD-10-CM

## 2020-08-11 DIAGNOSIS — Z00.00 PREVENTATIVE HEALTH CARE: ICD-10-CM

## 2020-08-11 LAB
BASOPHILS # BLD AUTO: 0.9 % (ref 0–1.8)
BASOPHILS # BLD: 0.06 K/UL (ref 0–0.12)
EOSINOPHIL # BLD AUTO: 0.09 K/UL (ref 0–0.51)
EOSINOPHIL NFR BLD: 1.3 % (ref 0–6.9)
ERYTHROCYTE [DISTWIDTH] IN BLOOD BY AUTOMATED COUNT: 42.1 FL (ref 35.9–50)
HCT VFR BLD AUTO: 47.4 % (ref 37–47)
HGB BLD-MCNC: 15.9 G/DL (ref 12–16)
IMM GRANULOCYTES # BLD AUTO: 0.01 K/UL (ref 0–0.11)
IMM GRANULOCYTES NFR BLD AUTO: 0.1 % (ref 0–0.9)
LYMPHOCYTES # BLD AUTO: 2.17 K/UL (ref 1–4.8)
LYMPHOCYTES NFR BLD: 31 % (ref 22–41)
MCH RBC QN AUTO: 32 PG (ref 27–33)
MCHC RBC AUTO-ENTMCNC: 33.5 G/DL (ref 33.6–35)
MCV RBC AUTO: 95.4 FL (ref 81.4–97.8)
MONOCYTES # BLD AUTO: 0.65 K/UL (ref 0–0.85)
MONOCYTES NFR BLD AUTO: 9.3 % (ref 0–13.4)
NEUTROPHILS # BLD AUTO: 4.03 K/UL (ref 2–7.15)
NEUTROPHILS NFR BLD: 57.4 % (ref 44–72)
NRBC # BLD AUTO: 0 K/UL
NRBC BLD-RTO: 0 /100 WBC
PLATELET # BLD AUTO: 321 K/UL (ref 164–446)
PMV BLD AUTO: 9.7 FL (ref 9–12.9)
RBC # BLD AUTO: 4.97 M/UL (ref 4.2–5.4)
WBC # BLD AUTO: 7 K/UL (ref 4.8–10.8)

## 2020-08-11 PROCEDURE — 80053 COMPREHEN METABOLIC PANEL: CPT

## 2020-08-11 PROCEDURE — 80061 LIPID PANEL: CPT

## 2020-08-11 PROCEDURE — 84443 ASSAY THYROID STIM HORMONE: CPT

## 2020-08-11 PROCEDURE — 86769 SARS-COV-2 COVID-19 ANTIBODY: CPT

## 2020-08-11 PROCEDURE — 36415 COLL VENOUS BLD VENIPUNCTURE: CPT

## 2020-08-11 PROCEDURE — 85025 COMPLETE CBC W/AUTO DIFF WBC: CPT

## 2020-08-11 PROCEDURE — 84439 ASSAY OF FREE THYROXINE: CPT

## 2020-08-12 ENCOUNTER — TELEPHONE (OUTPATIENT)
Dept: MEDICAL GROUP | Facility: LAB | Age: 60
End: 2020-08-12

## 2020-08-12 DIAGNOSIS — E78.5 HYPERLIPIDEMIA, UNSPECIFIED HYPERLIPIDEMIA TYPE: ICD-10-CM

## 2020-08-12 DIAGNOSIS — E03.8 OTHER SPECIFIED HYPOTHYROIDISM: ICD-10-CM

## 2020-08-12 DIAGNOSIS — Z91.89 OTHER SPECIFIED PERSONAL RISK FACTORS, NOT ELSEWHERE CLASSIFIED: ICD-10-CM

## 2020-08-12 LAB
ALBUMIN SERPL BCP-MCNC: 4.3 G/DL (ref 3.2–4.9)
ALBUMIN/GLOB SERPL: 1.8 G/DL
ALP SERPL-CCNC: 78 U/L (ref 30–99)
ALT SERPL-CCNC: 14 U/L (ref 2–50)
ANION GAP SERPL CALC-SCNC: 12 MMOL/L (ref 7–16)
AST SERPL-CCNC: 21 U/L (ref 12–45)
BILIRUB SERPL-MCNC: 0.5 MG/DL (ref 0.1–1.5)
BUN SERPL-MCNC: 15 MG/DL (ref 8–22)
CALCIUM SERPL-MCNC: 9.1 MG/DL (ref 8.5–10.5)
CHLORIDE SERPL-SCNC: 104 MMOL/L (ref 96–112)
CHOLEST SERPL-MCNC: 237 MG/DL (ref 100–199)
CO2 SERPL-SCNC: 24 MMOL/L (ref 20–33)
CREAT SERPL-MCNC: 0.65 MG/DL (ref 0.5–1.4)
FASTING STATUS PATIENT QL REPORTED: NORMAL
GLOBULIN SER CALC-MCNC: 2.4 G/DL (ref 1.9–3.5)
GLUCOSE SERPL-MCNC: 93 MG/DL (ref 65–99)
HDLC SERPL-MCNC: 73 MG/DL
LDLC SERPL CALC-MCNC: 152 MG/DL
POTASSIUM SERPL-SCNC: 4.5 MMOL/L (ref 3.6–5.5)
PROT SERPL-MCNC: 6.7 G/DL (ref 6–8.2)
SARS-COV-2 AB SERPL QL IA: NORMAL
SODIUM SERPL-SCNC: 140 MMOL/L (ref 135–145)
T4 FREE SERPL-MCNC: 2.05 NG/DL (ref 0.93–1.7)
TRIGL SERPL-MCNC: 60 MG/DL (ref 0–149)
TSH SERPL DL<=0.005 MIU/L-ACNC: 0.06 UIU/ML (ref 0.38–5.33)

## 2020-08-12 NOTE — TELEPHONE ENCOUNTER
----- Message from BESSY Rodas sent at 8/12/2020  7:01 AM PDT -----  Please call Jess -thyroid is overmedicated.  I would like for her to reduce her levothyroxine intake to 6 days/week, skipping the seventh day and rechecking this in 4 to 6 weeks.  COVID testing is negative for antibodies -meaning she most likely has never had COVID-19.  LDL cholesterol is elevated at 152.  I would like for her to have a test called a CT cardiac scoring which makes sure that she does not have significant plaque buildup in her coronary arteries.  Let me know if she is okay with me ordering that test.  Kidneys, liver and blood sugar look healthy.  Immune system also looks healthy and no problems with anemia.

## 2020-08-25 ENCOUNTER — HOSPITAL ENCOUNTER (OUTPATIENT)
Dept: RADIOLOGY | Facility: MEDICAL CENTER | Age: 60
End: 2020-08-25
Attending: NURSE PRACTITIONER
Payer: COMMERCIAL

## 2020-08-25 DIAGNOSIS — Z12.31 ENCOUNTER FOR SCREENING MAMMOGRAM FOR BREAST CANCER: ICD-10-CM

## 2020-08-25 PROCEDURE — 77067 SCR MAMMO BI INCL CAD: CPT

## 2020-09-01 ENCOUNTER — TELEPHONE (OUTPATIENT)
Dept: MEDICAL GROUP | Facility: LAB | Age: 60
End: 2020-09-01

## 2020-09-01 ENCOUNTER — HOSPITAL ENCOUNTER (OUTPATIENT)
Dept: RADIOLOGY | Facility: MEDICAL CENTER | Age: 60
End: 2020-09-01
Attending: NURSE PRACTITIONER
Payer: COMMERCIAL

## 2020-09-01 DIAGNOSIS — Z91.89 OTHER SPECIFIED PERSONAL RISK FACTORS, NOT ELSEWHERE CLASSIFIED: ICD-10-CM

## 2020-09-01 DIAGNOSIS — E78.5 HYPERLIPIDEMIA, UNSPECIFIED HYPERLIPIDEMIA TYPE: ICD-10-CM

## 2020-09-01 PROCEDURE — 4410556 CT-CARDIAC SCORING

## 2020-09-01 NOTE — TELEPHONE ENCOUNTER
----- Message from BESSY Rodas sent at 9/1/2020 12:41 PM PDT -----  Please let Jess know that CT cardiac score does not show significant plaque build up.  Great news!

## 2020-12-09 ENCOUNTER — TELEPHONE (OUTPATIENT)
Dept: MEDICAL GROUP | Facility: LAB | Age: 60
End: 2020-12-09

## 2020-12-09 DIAGNOSIS — Z11.59 ENCOUNTER FOR SCREENING FOR OTHER VIRAL DISEASES: ICD-10-CM

## 2020-12-10 ENCOUNTER — HOSPITAL ENCOUNTER (OUTPATIENT)
Dept: LAB | Facility: MEDICAL CENTER | Age: 60
End: 2020-12-10
Attending: NURSE PRACTITIONER
Payer: COMMERCIAL

## 2020-12-10 DIAGNOSIS — Z11.59 ENCOUNTER FOR SCREENING FOR OTHER VIRAL DISEASES: ICD-10-CM

## 2020-12-10 LAB
COVID ORDER STATUS COVID19: NORMAL
SARS-COV-2 RNA RESP QL NAA+PROBE: NOTDETECTED
SPECIMEN SOURCE: NORMAL

## 2020-12-10 PROCEDURE — U0003 INFECTIOUS AGENT DETECTION BY NUCLEIC ACID (DNA OR RNA); SEVERE ACUTE RESPIRATORY SYNDROME CORONAVIRUS 2 (SARS-COV-2) (CORONAVIRUS DISEASE [COVID-19]), AMPLIFIED PROBE TECHNIQUE, MAKING USE OF HIGH THROUGHPUT TECHNOLOGIES AS DESCRIBED BY CMS-2020-01-R: HCPCS

## 2020-12-10 PROCEDURE — C9803 HOPD COVID-19 SPEC COLLECT: HCPCS

## 2020-12-15 ENCOUNTER — TELEPHONE (OUTPATIENT)
Dept: MEDICAL GROUP | Facility: LAB | Age: 60
End: 2020-12-15

## 2020-12-15 NOTE — TELEPHONE ENCOUNTER
1. Caller Name: Jess Root                       Call Back Number: [phone] (home)       How would the patient prefer to be contacted with a response: Phone call OK to leave a detailed message    Pt is requesting a work note. Please send to [employer] ATTN Alex [phone]. It needs to say pt is to return  to work on 12/18 with full duty. Call pt once this is completed.

## 2020-12-15 NOTE — LETTER
December 15, 2020         Patient: Jess Root   YOB: 1960   Date of Visit: 12/15/2020         [facility]  [place] Alex  [phone]    Jess Root can return to work on 12/18/20 with full duty. Patient Covid test was negative.    If you have any questions or concerns, please don't hesitate to call.        Sincerely,           BLADE Rodas.  Electronically Signed

## 2021-01-04 ENCOUNTER — TELEPHONE (OUTPATIENT)
Dept: MEDICAL GROUP | Facility: LAB | Age: 61
End: 2021-01-04

## 2021-01-04 ENCOUNTER — HOSPITAL ENCOUNTER (OUTPATIENT)
Dept: LAB | Facility: MEDICAL CENTER | Age: 61
End: 2021-01-04
Attending: PHYSICIAN ASSISTANT
Payer: COMMERCIAL

## 2021-01-04 DIAGNOSIS — Z11.59 SPECIAL SCREENING EXAMINATION FOR VIRAL DISEASE: ICD-10-CM

## 2021-01-04 LAB — COVID ORDER STATUS COVID19: NORMAL

## 2021-01-04 PROCEDURE — C9803 HOPD COVID-19 SPEC COLLECT: HCPCS

## 2021-01-04 PROCEDURE — U0005 INFEC AGEN DETEC AMPLI PROBE: HCPCS

## 2021-01-04 PROCEDURE — U0003 INFECTIOUS AGENT DETECTION BY NUCLEIC ACID (DNA OR RNA); SEVERE ACUTE RESPIRATORY SYNDROME CORONAVIRUS 2 (SARS-COV-2) (CORONAVIRUS DISEASE [COVID-19]), AMPLIFIED PROBE TECHNIQUE, MAKING USE OF HIGH THROUGHPUT TECHNOLOGIES AS DESCRIBED BY CMS-2020-01-R: HCPCS

## 2021-01-04 NOTE — TELEPHONE ENCOUNTER
Patient called reporting multiple symptoms. On New Year's Elham she bagan vomiting all night long (not associated with ETOH intake patient made this clear). Vomiting has since resolved. She has been experiencing hot flashes and chills but afebrile per her home thermometer. Patient also began experiencing diarrhea and congestion yesterday. She works at [employer] and would like to be tested for COVID.     Educated patient on importance of staying hydrated with frequent fluid intake. Patient agrees and has been increasing her hydration. She reports that she is starting to feel better but still with the diarrhea and congestion. ER/UC precautions reviewed. Patient verbalized understanding and is agreeable to the plan.    Will route to PCP to request COVID test.     LUIS Betancourt

## 2021-01-04 NOTE — LETTER
January 5, 2021         Patient: Jess Root   YOB: 1960   Date of Visit: 1/4/2021           To Whom it May Concern:    Jess Root tested NEGATIVE for COVID-19 on 1/5/21. She may return to work FULL DUTY on Friday, January 8, 2021.     If you have any questions or concerns, please don't hesitate to call.        Sincerely,           Ruchi DOHERTY  Electronically Signed

## 2021-01-05 LAB
SARS-COV-2 RNA RESP QL NAA+PROBE: NOTDETECTED
SPECIMEN SOURCE: NORMAL

## 2021-01-05 NOTE — TELEPHONE ENCOUNTER
"Patient tested negative for COVID. She is still feeling ill-- reports a mild stomach ache, bloating, and general malaise/fatigue. Her diarrhea and vomiting has stopped; afebrile. Staying hydrated and keeping food/fluids down. Does not feel that she needs a doctors appt at this time.     Patient is requesting a doctors note to state \"COVID-19 test results were negative. She may return to full duty 1/9/21\" to be faxed to her work ([employer] [address] Fax# [phone] Attn: Alex Hogan).     Encouraged patient to denton with any further questions or concerns and present to UC if symptoms worsen or fail to improve.     LUIS Betancourt    "

## 2021-02-05 DIAGNOSIS — R60.0 PEDAL EDEMA: ICD-10-CM

## 2021-02-05 RX ORDER — POTASSIUM CHLORIDE 750 MG/1
TABLET, FILM COATED, EXTENDED RELEASE ORAL
Qty: 30 TAB | Refills: 4 | Status: SHIPPED | OUTPATIENT
Start: 2021-02-05 | End: 2022-01-17

## 2021-02-05 NOTE — TELEPHONE ENCOUNTER
Received request via: Pharmacy    Was the patient seen in the last year in this department? Yes  8/10/20  Does the patient have an active prescription (recently filled or refills available) for medication(s) requested? No

## 2021-03-10 ENCOUNTER — OFFICE VISIT (OUTPATIENT)
Dept: URGENT CARE | Facility: CLINIC | Age: 61
End: 2021-03-10
Payer: COMMERCIAL

## 2021-03-10 ENCOUNTER — APPOINTMENT (OUTPATIENT)
Dept: RADIOLOGY | Facility: MEDICAL CENTER | Age: 61
End: 2021-03-10
Attending: NURSE PRACTITIONER
Payer: COMMERCIAL

## 2021-03-10 ENCOUNTER — NURSE TRIAGE (OUTPATIENT)
Dept: MEDICAL GROUP | Facility: LAB | Age: 61
End: 2021-03-10

## 2021-03-10 VITALS
BODY MASS INDEX: 26.9 KG/M2 | HEIGHT: 60 IN | RESPIRATION RATE: 16 BRPM | TEMPERATURE: 98.6 F | WEIGHT: 137 LBS | DIASTOLIC BLOOD PRESSURE: 98 MMHG | HEART RATE: 77 BPM | OXYGEN SATURATION: 99 % | SYSTOLIC BLOOD PRESSURE: 142 MMHG

## 2021-03-10 DIAGNOSIS — R10.9 LEFT FLANK PAIN: ICD-10-CM

## 2021-03-10 DIAGNOSIS — R30.0 DYSURIA: ICD-10-CM

## 2021-03-10 DIAGNOSIS — R31.29 OTHER MICROSCOPIC HEMATURIA: ICD-10-CM

## 2021-03-10 LAB
APPEARANCE UR: CLEAR
BILIRUB UR STRIP-MCNC: NORMAL MG/DL
COLOR UR AUTO: YELLOW
GLUCOSE UR STRIP.AUTO-MCNC: NORMAL MG/DL
KETONES UR STRIP.AUTO-MCNC: NORMAL MG/DL
LEUKOCYTE ESTERASE UR QL STRIP.AUTO: NORMAL
NITRITE UR QL STRIP.AUTO: NORMAL
PH UR STRIP.AUTO: 7 [PH] (ref 5–8)
PROT UR QL STRIP: NORMAL MG/DL
RBC UR QL AUTO: NORMAL
SP GR UR STRIP.AUTO: 1.01
UROBILINOGEN UR STRIP-MCNC: 0.2 MG/DL

## 2021-03-10 PROCEDURE — 99214 OFFICE O/P EST MOD 30 MIN: CPT | Performed by: NURSE PRACTITIONER

## 2021-03-10 PROCEDURE — 81002 URINALYSIS NONAUTO W/O SCOPE: CPT | Performed by: NURSE PRACTITIONER

## 2021-03-10 RX ORDER — POTASSIUM CHLORIDE 750 MG/1
TABLET, FILM COATED, EXTENDED RELEASE ORAL
COMMUNITY
End: 2021-03-10

## 2021-03-10 ASSESSMENT — ENCOUNTER SYMPTOMS
SHORTNESS OF BREATH: 0
FEVER: 0
WEAKNESS: 0
ABDOMINAL PAIN: 0
COUGH: 0
ORTHOPNEA: 0
HEADACHES: 0
MYALGIAS: 0
DIZZINESS: 0
NERVOUS/ANXIOUS: 0
VOMITING: 0
NAUSEA: 0
EYE PAIN: 0
SORE THROAT: 0
CHILLS: 0
FLANK PAIN: 1

## 2021-03-10 ASSESSMENT — FIBROSIS 4 INDEX: FIB4 SCORE: 1.08

## 2021-03-10 NOTE — TELEPHONE ENCOUNTER
Patient called and LVM stating that she thinks she may have a UTI or Kidney infection    [phone] (home)

## 2021-03-10 NOTE — PROGRESS NOTES
Subjective:   Jess Root is a 60 y.o. female who presents for UTI (x3 days, hx of kidney stone, urine color change ), Urinary Frequency, and Low Back Pain       UTI  The current episode started in the past 7 days. The problem occurs intermittently. The problem has been waxing and waning. Pertinent negatives include no abdominal pain, chest pain, chills, congestion, coughing, fever, headaches, myalgias, nausea, rash, sore throat, vomiting or weakness. Nothing aggravates the symptoms. She has tried drinking and position changes for the symptoms. The treatment provided mild relief.     Pt presents for evaluation of a new problem, reports 3-day history of urinary frequency, bladder fullness, and left back pain.  Patient states that her symptoms have been waxing waning, however felt significantly worse this morning.  Has history of a kidney infection in 1997, patient states that it feels similar to the same type of feeling.  Does not have a history of frequent UTIs.  Patient states when she used the restroom this morning, she noticed that there was an appearance of sand at the bottom of the toilet bowl.  Denies any new medications, supplements, or ill contacts.    Review of Systems   Constitutional: Negative for chills, fever and malaise/fatigue.   HENT: Negative for congestion and sore throat.    Eyes: Negative for pain.   Respiratory: Negative for cough and shortness of breath.    Cardiovascular: Negative for chest pain, orthopnea and leg swelling.   Gastrointestinal: Negative for abdominal pain, nausea and vomiting.   Genitourinary: Positive for dysuria, flank pain (possible, per patient report), frequency and urgency. Negative for hematuria.   Musculoskeletal: Negative for myalgias.   Skin: Negative for rash.   Neurological: Negative for dizziness, weakness and headaches.   Psychiatric/Behavioral: The patient is not nervous/anxious.    All other systems reviewed and are negative.      MEDS:   Current  Outpatient Medications:   •  potassium chloride ER (KLOR-CON) 10 MEQ tablet, potassium chloride ER 10 mEq tablet,extended release, Disp: , Rfl:   •  potassium chloride ER (KLOR-CON) 10 MEQ tablet, TAKE ONE TO TWO TABLETS BY MOUTH ONCE DAILY, Disp: 30 Tab, Rfl: 4  •  Brimonidine Tartrate 0.025 % Solution, 1 Drop by Ophthalmic route 2 times a day., Disp: 2.5 mL, Rfl: 3  •  fluticasone (FLONASE) 50 MCG/ACT nasal spray, Spray 1 Spray in nose every day., Disp: 16 g, Rfl: 0  •  furosemide (LASIX) 20 MG Tab, TAKE 1 TABLET BY MOUTH ONCE DAILY AS NEEDED FOR FLUID RETENTION *GENERIC FOR LASIX, Disp: 30 Tab, Rfl: 4  •  VENTOLIN  (90 Base) MCG/ACT Aero Soln inhalation aerosol, INHALE 2 PUFFS BY MOUTH EVERY 4 HOURS AS NEEDED, Disp: 18 g, Rfl: 2  •  levothyroxine (SYNTHROID) 125 MCG Tab, TAKE ONE TABLET BY MOUTH ONCE DAILY 6 DAYS PER WEEK AND 1/2 TABLET ONE DAY PER WEEK, Disp: 90 Tab, Rfl: 3  •  valACYclovir (VALTREX) 500 MG Tab, TAKE ONE TABLET BY MOUTH TWICE A DAY AS NEEDED FOR OUTBREAK, Disp: 60 Tab, Rfl: 11  ALLERGIES:   Allergies   Allergen Reactions   • Other Misc      Anesthesia-- pseudocholinesterace       Patient's PMH, SocHx, SurgHx, FamHx, Drug allergies and medications were reviewed.     Objective:   /98   Pulse 77   Temp 37 °C (98.6 °F) (Temporal)   Resp 16   Ht 1.524 m (5')   Wt 62.1 kg (137 lb)   SpO2 99%   Breastfeeding No   BMI 26.76 kg/m²     Physical Exam  Vitals and nursing note reviewed.   Constitutional:       General: She is awake.      Appearance: Normal appearance. She is well-developed.   HENT:      Head: Normocephalic and atraumatic.      Right Ear: External ear normal.      Left Ear: External ear normal.      Nose: Nose normal.      Mouth/Throat:      Mouth: Mucous membranes are moist.      Pharynx: Oropharynx is clear.   Eyes:      Extraocular Movements: Extraocular movements intact.      Conjunctiva/sclera: Conjunctivae normal.   Cardiovascular:      Rate and Rhythm: Normal rate  and regular rhythm.   Pulmonary:      Effort: Pulmonary effort is normal.      Breath sounds: Normal breath sounds.   Abdominal:      General: Abdomen is flat. Bowel sounds are normal.      Palpations: Abdomen is soft.      Tenderness: There is no right CVA tenderness or left CVA tenderness.      Comments: No CVA tenderness noted, however patient states that she feels a bit more pressure on the left side with palpation.   Musculoskeletal:         General: Normal range of motion.      Cervical back: Normal range of motion and neck supple.   Skin:     General: Skin is warm and dry.   Neurological:      Mental Status: She is alert and oriented to person, place, and time.   Psychiatric:         Mood and Affect: Mood normal.         Behavior: Behavior normal.         Thought Content: Thought content normal.         Assessment/Plan:   Assessment    1. Dysuria  - CT-RENAL COLIC EVALUATION(A/P W/O); Future  - POCT Urinalysis    2. Left flank pain  - CT-RENAL COLIC EVALUATION(A/P W/O); Future  - POCT Urinalysis    3. Other microscopic hematuria  - CT-RENAL COLIC EVALUATION(A/P W/O); Future  - POCT Urinalysis    Vital signs stable at today's acute urgent care visit.  Reviewed test results that were completed in the clinic.  Will order a renal colic CT.  Due to patient's other medical appointments today, unable to schedule for today.  Therefore, scheduled for second my warning.  Patient given very strict ED return precautions. Discussed management options (risks, benefits, and alternatives to treatment).  Once CT results return, will continue to manage her diagnoses should any medications be required.    Advised the patient to follow-up with the primary care provider for recheck, reevaluation, and/or consideration of further management if necessary. Return to urgent care with any worsening symptoms or if there is no improvement in their current condition. Red flags discussed and indications to immediately call 911 or present  to the Emergency Department.  All questions were encouraged and answered to the patient's satisfaction and understanding, and they agree to the plan of care.     I personally reviewed prior external notes and test results pertinent to today's visit.  I have independently reviewed and interpreted all diagnostics ordered during this urgent care acute visit. Time spent evaluating this patient was a minimum of 30 minutes and includes preparing for visit, counseling/education, exam and evaluation, obtaining history, independent interpretation and ordering lab/test/procedures.      Please note that this dictation was created using voice recognition software. I have made a reasonable attempt to correct obvious errors, but I expect that there are errors of grammar and possibly content that I did not discover before finalizing the note.

## 2021-03-10 NOTE — TELEPHONE ENCOUNTER
Phone Number Called: ,ph    Call outcome: Did not leave a detailed message. Requested patient to call back.    Message: Requested return call regarding VM she left. Also advised urgent care if its an urgent situation.     LUIS Betancourt

## 2021-03-10 NOTE — TELEPHONE ENCOUNTER
Reason for Disposition  • Urinating more frequently than usual (i.e., frequency)  • Side (flank) or lower back pain present    Additional Information  • Negative: Followed a genital area injury  • Negative: Followed a genital area injury (penis, scrotum)  • Negative: Vaginal discharge  • Negative: Pus (white, yellow) or bloody discharge from end of penis  • Negative: [1] Taking antibiotic for urinary tract infection (UTI) AND [2] female  • Negative: [1] Taking antibiotic for urinary tract infection (UTI) AND [2] male  • Negative: [1] Discomfort (pain, burning or stinging) when passing urine AND [2] pregnant  • Negative: [1] Discomfort (pain, burning or stinging) when passing urine AND [2] postpartum (from 0 to 6 weeks after delivery)  • Negative: [1] Discomfort (pain, burning or stinging) when passing urine AND [2] female  • Negative: [1] Discomfort (pain, burning or stinging) when passing urine AND [2] male  • Negative: Pain or itching in the vulvar area  • Negative: Pain in scrotum is main symptom  • Negative: Blood in the urine is main symptom  • Negative: Symptoms arising from use of a urinary catheter (Johnson or Coude)  • Negative: [1] Unable to urinate (or only a few drops) > 4 hours AND     [2] bladder feels very full (e.g., palpable bladder or strong urge to urinate)  • Negative: [1] Decreased urination and [2] drinking very little AND [2] dehydration suspected (e.g., dark urine, no urine > 12 hours, very dry mouth, very lightheaded)  • Negative: Patient sounds very sick or weak to the triager  • Negative: Fever > 100.5 F (38.1 C)  • Negative: [1] Can't control passage of urine (i.e., urinary incontinence) AND [2] new onset (< 2 weeks) or worsening    Protocols used: URINARY SYMPTOMS-A-AH

## 2021-03-10 NOTE — TELEPHONE ENCOUNTER
Called to triage patient. A few days ago she began experiencing bladder discomfort, urinary frequency. Denies dysuria. Endorses left sided flank pain 5/10 in intensity, but states it only started after she did some stretches. She feels warm and is not sure if she has a fever. Reports feeling slightly shaky. Has flecks in her urine and urine is a darker color/orange tinted.     Patient agrees to go to urgent care now.     LUIS Betancourt

## 2021-03-11 ENCOUNTER — HOSPITAL ENCOUNTER (OUTPATIENT)
Dept: RADIOLOGY | Facility: MEDICAL CENTER | Age: 61
End: 2021-03-11
Attending: NURSE PRACTITIONER
Payer: COMMERCIAL

## 2021-03-11 DIAGNOSIS — R30.0 DYSURIA: ICD-10-CM

## 2021-03-11 DIAGNOSIS — R10.9 LEFT FLANK PAIN: ICD-10-CM

## 2021-03-11 DIAGNOSIS — R31.29 OTHER MICROSCOPIC HEMATURIA: ICD-10-CM

## 2021-03-11 PROCEDURE — 74176 CT ABD & PELVIS W/O CONTRAST: CPT

## 2021-03-15 ENCOUNTER — TELEPHONE (OUTPATIENT)
Dept: MEDICAL GROUP | Facility: LAB | Age: 61
End: 2021-03-15

## 2021-03-15 NOTE — TELEPHONE ENCOUNTER
Patient called and LVM stating that she had a CT done in UC and that she was told she had kidney stones and was not told the size. Tried to call back/ had to leave message for more information

## 2021-03-16 NOTE — TELEPHONE ENCOUNTER
MA asked RN to call this patient regarding results.     Called pt and no answer. CT results state that APRN already informed pt of results. LVM for pt to call back with any further questions.     LUIS Betancourt

## 2021-06-29 RX ORDER — LEVOTHYROXINE SODIUM 0.12 MG/1
TABLET ORAL
Qty: 90 TABLET | Refills: 3 | Status: SHIPPED | OUTPATIENT
Start: 2021-06-29 | End: 2022-01-31 | Stop reason: SDUPTHER

## 2021-08-09 ENCOUNTER — TELEPHONE (OUTPATIENT)
Dept: MEDICAL GROUP | Facility: LAB | Age: 61
End: 2021-08-09

## 2021-08-09 NOTE — TELEPHONE ENCOUNTER
Patient called and LVM wanting to know if she was a candidate for an exemption for Covid vaccine since she has an allergy to Anesthesia-- pseudocholinesterace.  Please advise

## 2021-08-09 NOTE — TELEPHONE ENCOUNTER
Unfortunately to order a antibody test, I need her to answer several questions such as if she thinks that she was exposed in the approximate date.  Also if she ever had symptoms and not approximate date.  Thanks  .  Even with antibodies we recommend vaccination as the vaccine helps the immune system recognize the spike proteins and antibodies from the actual infection only help to recognize the body of the virus.  Mutations of the virus are typically better controlled with the vaccine because of the spike protein recognition.

## 2021-09-30 DIAGNOSIS — Z86.19 HISTORY OF CANDIDIASIS: ICD-10-CM

## 2021-09-30 DIAGNOSIS — R60.0 PEDAL EDEMA: ICD-10-CM

## 2021-09-30 DIAGNOSIS — R05.9 COUGH: ICD-10-CM

## 2021-09-30 DIAGNOSIS — R50.9 FEVER, UNSPECIFIED FEVER CAUSE: ICD-10-CM

## 2021-09-30 DIAGNOSIS — J98.01 BRONCHOSPASM, ACUTE: ICD-10-CM

## 2021-10-01 RX ORDER — ALBUTEROL SULFATE 90 UG/1
AEROSOL, METERED RESPIRATORY (INHALATION)
Qty: 1 EACH | Refills: 2 | Status: SHIPPED | OUTPATIENT
Start: 2021-10-01 | End: 2021-12-30

## 2021-10-01 RX ORDER — FUROSEMIDE 20 MG/1
TABLET ORAL
Qty: 30 TABLET | Refills: 4 | Status: SHIPPED
Start: 2021-10-01 | End: 2022-02-14

## 2021-10-01 NOTE — TELEPHONE ENCOUNTER
Received request via: Pharmacy    Was the patient seen in the last year in this department? No   LOV 08/10/20    Does the patient have an active prescription (recently filled or refills available) for medication(s) requested? No

## 2021-10-14 ENCOUNTER — OFFICE VISIT (OUTPATIENT)
Dept: MEDICAL GROUP | Facility: LAB | Age: 61
End: 2021-10-14
Payer: COMMERCIAL

## 2021-10-14 VITALS
BODY MASS INDEX: 27.29 KG/M2 | SYSTOLIC BLOOD PRESSURE: 139 MMHG | WEIGHT: 139 LBS | HEART RATE: 84 BPM | TEMPERATURE: 98.6 F | RESPIRATION RATE: 12 BRPM | OXYGEN SATURATION: 99 % | HEIGHT: 60 IN | DIASTOLIC BLOOD PRESSURE: 90 MMHG

## 2021-10-14 DIAGNOSIS — I83.11 VARICOSE VEINS OF BOTH LOWER EXTREMITIES WITH INFLAMMATION: ICD-10-CM

## 2021-10-14 DIAGNOSIS — I80.01 THROMBOPHLEBITIS OF SUPERFICIAL VEINS OF RIGHT LOWER EXTREMITY: ICD-10-CM

## 2021-10-14 DIAGNOSIS — Z85.828 H/O BASAL CELL CARCINOMA EXCISION: ICD-10-CM

## 2021-10-14 DIAGNOSIS — Z12.31 ENCOUNTER FOR SCREENING MAMMOGRAM FOR MALIGNANT NEOPLASM OF BREAST: ICD-10-CM

## 2021-10-14 DIAGNOSIS — I83.12 VARICOSE VEINS OF BOTH LOWER EXTREMITIES WITH INFLAMMATION: ICD-10-CM

## 2021-10-14 DIAGNOSIS — Z98.890 H/O BASAL CELL CARCINOMA EXCISION: ICD-10-CM

## 2021-10-14 PROCEDURE — 99213 OFFICE O/P EST LOW 20 MIN: CPT | Performed by: FAMILY MEDICINE

## 2021-10-14 ASSESSMENT — FIBROSIS 4 INDEX: FIB4 SCORE: 1.1

## 2021-10-14 NOTE — PROGRESS NOTES
Subjective:     Chief Complaint   Patient presents with   • Bump     right leg x 5 days         HPI:   Jess presents today with a bump on her right leg, present for 5 days. Not really tender, but over her right knee cap. Has varicose veins, has for years.  Does report history of pain and swelling with these veins in the past.  Has had issues with compression stockings and poor fit and worsening of blood flow.    BP high as well , just got a call from her brothers doctor, he had a coronary event. Had stents placed last week, then arrested again today.      Current Outpatient Medications Ordered in Epic   Medication Sig Dispense Refill   • furosemide (LASIX) 20 MG Tab TAKE ONE TABLET BY MOUTH EVERY DAY AS NEEDED FOR FLUID RETENTION 30 Tablet 4   • VENTOLIN  (90 Base) MCG/ACT Aero Soln inhalation aerosol INHALE 2 PUFFS BY MOUTH EVERY 4 HOURS AS NEEDED 1 Each 2   • levothyroxine (SYNTHROID) 125 MCG Tab TAKE ONE TABLET BY MOUTH EVERY DAY FOR 6 DAYS PER WEEK AND ONE-HALF TABLET ONE DAY PER WEEK 90 tablet 3   • potassium chloride ER (KLOR-CON) 10 MEQ tablet TAKE ONE TO TWO TABLETS BY MOUTH ONCE DAILY 30 Tab 4   • Brimonidine Tartrate 0.025 % Solution 1 Drop by Ophthalmic route 2 times a day. 2.5 mL 3   • fluticasone (FLONASE) 50 MCG/ACT nasal spray Spray 1 Spray in nose every day. 16 g 0   • valACYclovir (VALTREX) 500 MG Tab TAKE ONE TABLET BY MOUTH TWICE A DAY AS NEEDED FOR OUTBREAK 60 Tab 11     No current [facility]-ordered facility-administered medications on file.         ROS:  Gen: no fevers/chills, no changes in weight  Eyes: no changes in vision  ENT: no sore throat, no hearing loss, no bloody nose  Pulm: no sob, no cough  CV: no chest pain, no palpitations  GI: no nausea/vomiting, no diarrhea  : no dysuria  MSk: no myalgias  Skin: no rash  Neuro: no headaches, no numbness/tingling  Heme/Lymph: no easy bruising      Objective:     Exam:  Pulse 84   Temp 37 °C (98.6 °F)   Resp 12   Ht 1.524 m (5')   Wt 63 kg  (139 lb)   SpO2 99%   BMI 27.15 kg/m²  Body mass index is 27.15 kg/m².    Gen: AAOx3, NAD, well appearing  HEENT: NCAT, EOMI, Nares patent, Mucosa moist  Resp: Normal chest wall rise and fall, not SOB, no tachypnea  Skin: no rash or abnormality of visible skin.   Psych: normal speech, not slurred, good insight, affect full  MSK: Moves all four limbs equally and normally, gait normal  Vascular: She does have severe varicose veins at the right lower extremity, particularly over her patella.  There is one area that is more erythematous and more firm.  It is warm when compared the right the rest of her skin as well.  Consistent with superficial thrombophlebitis.      Assessment & Plan:     61 y.o. female with the following -     1. Thrombophlebitis of superficial veins of right lower extremity  Discussed use of NSAIDs like aspirin or Aleve and heating pad for pain improvement.  We did discuss referral to vascular surgery in the vein clinic to get these taken care of as well.  - REFERRAL TO VASCULAR SURGERY    2. Varicose veins of both lower extremities with inflammation  See above.  - REFERRAL TO VASCULAR SURGERY    3. H/O basal cell carcinoma excision      4. Encounter for screening mammogram for malignant neoplasm of breast    - MA-SCREENING MAMMO BILAT W/TOMOSYNTHESIS W/CAD; Future\          No follow-ups on file.    Please note that this dictation was created using voice recognition software. I have made every reasonable attempt to correct obvious errors, but I expect that there are errors of grammar and possibly content that I did not discover before finalizing the note.

## 2021-12-29 ENCOUNTER — TELEPHONE (OUTPATIENT)
Dept: MEDICAL GROUP | Facility: LAB | Age: 61
End: 2021-12-29

## 2021-12-29 DIAGNOSIS — E03.8 OTHER SPECIFIED HYPOTHYROIDISM: ICD-10-CM

## 2021-12-29 DIAGNOSIS — Z00.00 PREVENTATIVE HEALTH CARE: ICD-10-CM

## 2021-12-29 NOTE — TELEPHONE ENCOUNTER
1. Caller Name: stone                        Call Back Number: [phone]      How would the patient prefer to be contacted with a response: Phone call OK to leave a detailed message    Pt wants labs to check thyroid due to medication making her hair fall out and she's having other symptoms since starting on this new medication.  
Please let her know that I ordered thyroid labs in addition to her normal screening lab work which will include anemia, diabetes, kidneys, liver, electrolytes and cholesterol. She does need to fast for 8 to 10 hours.  
Pt was informed   
General

## 2022-01-14 DIAGNOSIS — R60.0 PEDAL EDEMA: ICD-10-CM

## 2022-01-14 NOTE — TELEPHONE ENCOUNTER
Received request via: Pharmacy    Was the patient seen in the last year in this department? Yes  10/14/21  Does the patient have an active prescription (recently filled or refills available) for medication(s) requested? No

## 2022-01-17 RX ORDER — POTASSIUM CHLORIDE 750 MG/1
TABLET, FILM COATED, EXTENDED RELEASE ORAL
Qty: 30 TABLET | Refills: 4 | Status: ON HOLD
Start: 2022-01-17 | End: 2022-08-21

## 2022-01-24 ENCOUNTER — TELEPHONE (OUTPATIENT)
Dept: MEDICAL GROUP | Facility: LAB | Age: 62
End: 2022-01-24

## 2022-01-24 NOTE — TELEPHONE ENCOUNTER
1. Caller Name: Jess Root                          Call Back Number: [phone] (home)         How would the patient prefer to be contacted with a response: Phone call OK to leave a detailed message    Pt has  Pain in right leg. Warm to touch. Has appt wednesday 1/26 at urgent care. She is asking if she should wait. She is worried

## 2022-01-24 NOTE — TELEPHONE ENCOUNTER
Thanks for asking all of those questions.  Sounds very low risk for a blood clot but she should go to the emergency department if she begins to note worsening pain, increased swelling with redness and particularly if she has shortness of breath or chest pain.  Because it is risky to give advice about this without looking at the patient, if we have any openings here today or tomorrow, will you offer her 1 of those even if it is with a different provider.  Thank you

## 2022-01-24 NOTE — TELEPHONE ENCOUNTER
Pt has not flown.No surgery. Not sure of blood clots in the past.   Yes, the other day she had dinner with a friend and sat for 2 hrs. Pt does not smoke. Pt not on hormones.

## 2022-01-26 ENCOUNTER — TELEPHONE (OUTPATIENT)
Dept: URGENT CARE | Facility: PHYSICIAN GROUP | Age: 62
End: 2022-01-26

## 2022-01-26 ENCOUNTER — OFFICE VISIT (OUTPATIENT)
Dept: URGENT CARE | Facility: PHYSICIAN GROUP | Age: 62
End: 2022-01-26
Payer: COMMERCIAL

## 2022-01-26 ENCOUNTER — HOSPITAL ENCOUNTER (OUTPATIENT)
Dept: RADIOLOGY | Facility: MEDICAL CENTER | Age: 62
End: 2022-01-26
Attending: NURSE PRACTITIONER
Payer: COMMERCIAL

## 2022-01-26 VITALS
BODY MASS INDEX: 28.47 KG/M2 | OXYGEN SATURATION: 99 % | RESPIRATION RATE: 14 BRPM | HEIGHT: 60 IN | SYSTOLIC BLOOD PRESSURE: 138 MMHG | TEMPERATURE: 98.1 F | WEIGHT: 145 LBS | HEART RATE: 68 BPM | DIASTOLIC BLOOD PRESSURE: 100 MMHG

## 2022-01-26 DIAGNOSIS — I83.811 VARICOSE VEINS OF LEG WITH PAIN, RIGHT: ICD-10-CM

## 2022-01-26 PROCEDURE — 93971 EXTREMITY STUDY: CPT | Mod: RT

## 2022-01-26 PROCEDURE — 99214 OFFICE O/P EST MOD 30 MIN: CPT | Performed by: NURSE PRACTITIONER

## 2022-01-26 ASSESSMENT — ENCOUNTER SYMPTOMS
FALLS: 0
MYALGIAS: 1
SENSORY CHANGE: 1
CHILLS: 0
TINGLING: 1
NECK PAIN: 0
FEVER: 0
BACK PAIN: 0

## 2022-01-26 ASSESSMENT — PAIN SCALES - GENERAL: PAINLEVEL: 4=SLIGHT-MODERATE PAIN

## 2022-01-26 ASSESSMENT — FIBROSIS 4 INDEX: FIB4 SCORE: 1.1

## 2022-01-26 NOTE — PROGRESS NOTES
Subjective:     Jess Root is a 61 y.o. female who presents for Leg Pain (hx of clots, right leg pain)      HPI  Pt presents for evaluation of a new problem. Jess is a pleasant 61-year-old female presents to urgent care today with complaints of right inner thigh pain that presented 2 days ago.  She states that she has had a history of painful varicose veins but no history of DVTs.  She did have a vein stripping performed approximately 15 years ago and has had nonstop problems ever since.  She does work as a  and is on her feet all day long.  She does take aspirin as needed for relief of her pain as needed.  She does not wear compression stockings.  She is concerned that this new pain in her right inner thigh she can feel a palpable mass.  She does have numbness and tingling down to her right foot.       Review of Systems   Constitutional: Negative for chills and fever.   Musculoskeletal: Positive for myalgias. Negative for back pain, falls, joint pain and neck pain.   Neurological: Positive for tingling and sensory change.       PMH:   Past Medical History:   Diagnosis Date   • HSV-2 infection    • Hyperlipidemia    • Osteopenia    • Thyroid disease nodules - thyroidectomy     ALLERGIES:   Allergies   Allergen Reactions   • Other Misc      Anesthesia-- pseudocholinesterace     SURGHX:   Past Surgical History:   Procedure Laterality Date   • ABDOMINAL HYSTERECTOMY TOTAL  205   • GYN SURGERY     • MENISCUS REPAIR      right knee 9/2017 - Dr. Johnson   • THYROIDECTOMY     • VEIN STRIPPING       SOCHX:   Social History     Socioeconomic History   • Marital status: Legally      Spouse name: Not on file   • Number of children: Not on file   • Years of education: Not on file   • Highest education level: Not on file   Occupational History   • Not on file   Tobacco Use   • Smoking status: Never Smoker   • Smokeless tobacco: Never Used   Vaping Use   • Vaping Use: Never used   Substance and  Sexual Activity   • Alcohol use: No   • Drug use: No   • Sexual activity: Not on file     Comment: , two kids   Other Topics Concern   • Not on file   Social History Narrative   • Not on file     Social Determinants of Health     Financial Resource Strain:    • Difficulty of Paying Living Expenses: Not on file   Food Insecurity:    • Worried About Running Out of Food in the Last Year: Not on file   • Ran Out of Food in the Last Year: Not on file   Transportation Needs:    • Lack of Transportation (Medical): Not on file   • Lack of Transportation (Non-Medical): Not on file   Physical Activity:    • Days of Exercise per Week: Not on file   • Minutes of Exercise per Session: Not on file   Stress:    • Feeling of Stress : Not on file   Social Connections:    • Frequency of Communication with Friends and Family: Not on file   • Frequency of Social Gatherings with Friends and Family: Not on file   • Attends Anabaptist Services: Not on file   • Active Member of Clubs or Organizations: Not on file   • Attends Club or Organization Meetings: Not on file   • Marital Status: Not on file   Intimate Partner Violence:    • Fear of Current or Ex-Partner: Not on file   • Emotionally Abused: Not on file   • Physically Abused: Not on file   • Sexually Abused: Not on file   Housing Stability:    • Unable to Pay for Housing in the Last Year: Not on file   • Number of Places Lived in the Last Year: Not on file   • Unstable Housing in the Last Year: Not on file     FH:   Family History   Problem Relation Age of Onset   • Diabetes Mother    • Heart Disease Mother          Objective:   /100   Pulse 68   Temp 36.7 °C (98.1 °F)   Resp 14   Ht 1.524 m (5')   Wt 65.8 kg (145 lb)   SpO2 99%   BMI 28.32 kg/m²     Physical Exam  Vitals and nursing note reviewed.   Constitutional:       General: She is not in acute distress.     Appearance: Normal appearance. She is not ill-appearing.   HENT:      Head: Normocephalic and  atraumatic.      Right Ear: External ear normal.      Left Ear: External ear normal.      Nose: No congestion or rhinorrhea.      Mouth/Throat:      Mouth: Mucous membranes are moist.   Eyes:      Extraocular Movements: Extraocular movements intact.      Pupils: Pupils are equal, round, and reactive to light.   Cardiovascular:      Rate and Rhythm: Normal rate and regular rhythm.      Pulses: Normal pulses.      Heart sounds: Normal heart sounds.   Pulmonary:      Effort: Pulmonary effort is normal.      Breath sounds: Normal breath sounds.   Abdominal:      General: Abdomen is flat. Bowel sounds are normal.      Palpations: Abdomen is soft.      Tenderness: There is no abdominal tenderness. There is no right CVA tenderness or left CVA tenderness.   Musculoskeletal:         General: Normal range of motion.      Cervical back: Normal range of motion and neck supple.      Comments: Multiple superficial and palpable varicose veins to right lower extremity.  There is a palpable pain to her right inner thigh.  Negative for erythema.  Her skin is not hot to touch.  Negative Homans' sign.    Skin:     General: Skin is warm and dry.      Capillary Refill: Capillary refill takes less than 2 seconds.   Neurological:      General: No focal deficit present.      Mental Status: She is alert and oriented to person, place, and time. Mental status is at baseline.   Psychiatric:         Mood and Affect: Mood normal.         Behavior: Behavior normal.         Thought Content: Thought content normal.         Judgment: Judgment normal.         Assessment/Plan:   Assessment      1. Varicose veins of leg with pain, right  US-EXTREMITY VENOUS LOWER UNILAT RIGHT    Referral to Vascular Surgery   She was educated that DVTs are usually not palpable.  However, due to her new onset of pain and history of complex varicose veins ultrasound was ordered stat.  We will call her with results.  She was referred to follow-up with Dr. Audra henry  wishes to have varicose veins removed.

## 2022-01-26 NOTE — LETTER
January 26, 2022    To Whom It May Concern:         This is confirmation that Jess Root attended her scheduled appointment with LILLY Gray on 1/26/22.  Please excuse her absence today due to a noncontagious acute condition.       If you have any questions please do not hesitate to call me at the phone number listed below.    Sincerely,          TAWNYA Gray.  [phone]

## 2022-01-26 NOTE — PATIENT INSTRUCTIONS
Deep Vein Thrombosis    Deep vein thrombosis (DVT) is a condition in which a blood clot forms in a deep vein, such as a lower leg, thigh, or arm vein. A clot is blood that has thickened into a gel or solid. This condition is dangerous. It can lead to serious and even life-threatening complications if the clot travels to the lungs and causes a blockage (pulmonary embolism). It can also damage veins in the leg. This can result in leg pain, swelling, discoloration, and sores (post-thrombotic syndrome).  What are the causes?  This condition may be caused by:  · A slowdown of blood flow.  · Damage to a vein.  · A condition that causes blood to clot more easily, such as an inherited clotting disorder.  What increases the risk?  The following factors may make you more likely to develop this condition:  · Being overweight.  · Being older, especially over age 60.  · Sitting or lying down for more than four hours.  · Being in the hospital.  · Lack of physical activity (sedentary lifestyle).  · Pregnancy, being in childbirth, or having recently given birth.  · Taking medicines that contain estrogen, such as medicines to prevent pregnancy.  · Smoking.  · A history of any of the following:  ? Blood clots or a blood clotting disease.  ? Peripheral vascular disease.  ? Inflammatory bowel disease.  ? Cancer.  ? Heart disease.  ? Genetic conditions that affect how your blood clots, such as Factor V Leiden mutation.  ? Neurological diseases that affect your legs (leg paresis).  ? A recent injury, such as a car accident.  ? Major or lengthy surgery.  ? A central line placed inside a large vein.  What are the signs or symptoms?  Symptoms of this condition include:  · Swelling, pain, or tenderness in an arm or leg.  · Warmth, redness, or discoloration in an arm or leg.  If the clot is in your leg, symptoms may be more noticeable or worse when you stand or walk. Some people may not develop any symptoms.  How is this diagnosed?  This  condition is diagnosed with:  · A medical history and physical exam.  · Tests, such as:  ? Blood tests. These are done to check how well your blood clots.  ? Ultrasound. This is done to check for clots.  ? Venogram. For this test, contrast dye is injected into a vein and X-rays are taken to check for any clots.  How is this treated?  Treatment for this condition depends on:  · The cause of your DVT.  · Your risk for bleeding or developing more clots.  · Any other medical conditions that you have.  Treatment may include:  · Taking a blood thinner (anticoagulant). This type of medicine prevents clots from forming. It may be taken by mouth, injected under the skin, or injected through an IV (catheter).  · Injecting clot-dissolving medicines into the affected vein (catheter-directed thrombolysis).  · Having surgery. Surgery may be done to:  ? Remove the clot.  ? Place a filter in a large vein to catch blood clots before they reach the lungs.  Some treatments may be continued for up to six months.  Follow these instructions at home:  If you are taking blood thinners:  · Take the medicine exactly as told by your health care provider. Some blood thinners need to be taken at the same time every day. Do not skip a dose.  · Talk with your health care provider before you take any medicines that contain aspirin or NSAIDs. These medicines increase your risk for dangerous bleeding.  · Ask your health care provider about foods and drugs that could change the way the medicine works (may interact). Avoid those things if your health care provider tells you to do so.  · Blood thinners can cause easy bruising and may make it difficult to stop bleeding. Because of this:  ? Be very careful when using knives, scissors, or other sharp objects.  ? Use an electric razor instead of a blade.  ? Avoid activities that could cause injury or bruising, and follow instructions about how to prevent falls.  · Wear a medical alert bracelet or carry a  card that lists what medicines you take.  General instructions  · Take over-the-counter and prescription medicines only as told by your health care provider.  · Return to your normal activities as told by your health care provider. Ask your health care provider what activities are safe for you.  · Wear compression stockings if recommended by your health care provider.  · Keep all follow-up visits as told by your health care provider. This is important.  How is this prevented?  To lower your risk of developing this condition again:  · For 30 or more minutes every day, do an activity that:  ? Involves moving your arms and legs.  ? Increases your heart rate.  · When traveling for longer than four hours:  ? Exercise your arms and legs every hour.  ? Drink plenty of water.  ? Avoid drinking alcohol.  · Avoid sitting or lying for a long time without moving your legs.  · If you have surgery or you are hospitalized, ask about ways to prevent blood clots. These may include taking frequent walks or using anticoagulants.  · Stay at a healthy weight.  · If you are a woman who is older than age 35, avoid unnecessary use of medicines that contain estrogen, such as some birth control pills.  · Do not use any products that contain nicotine or tobacco, such as cigarettes and e-cigarettes. This is especially important if you take estrogen medicines. If you need help quitting, ask your health care provider.  Contact a health care provider if:  · You miss a dose of your blood thinner.  · Your menstrual period is heavier than usual.  · You have unusual bruising.  Get help right away if:  · You have:  ? New or increased pain, swelling, or redness in an arm or leg.  ? Numbness or tingling in an arm or leg.  ? Shortness of breath.  ? Chest pain.  ? A rapid or irregular heartbeat.  ? A severe headache or confusion.  ? A cut that will not stop bleeding.  · There is blood in your vomit, stool, or urine.  · You have a serious fall or accident,  or you hit your head.  · You feel light-headed or dizzy.  · You cough up blood.  These symptoms may represent a serious problem that is an emergency. Do not wait to see if the symptoms will go away. Get medical help right away. Call your local emergency services (911 in the [place] Do not drive yourself to the hospital.  Summary  · Deep vein thrombosis (DVT) is a condition in which a blood clot forms in a deep vein, such as a lower leg, thigh, or arm vein.  · Symptoms can include swelling, warmth, pain, and redness in your leg or arm.  · This condition may be treated with a blood thinner (anticoagulant medicine), medicine that is injected to dissolve blood clots,compression stockings, or surgery.  · If you are prescribed blood thinners, take them exactly as told.  This information is not intended to replace advice given to you by your health care provider. Make sure you discuss any questions you have with your health care provider.  Document Released: 12/18/2006 Document Revised: 11/30/2018 Document Reviewed: 05/18/2018  [employer] Patient Education © 2020 [employer]

## 2022-01-28 ENCOUNTER — TELEPHONE (OUTPATIENT)
Dept: MEDICAL GROUP | Facility: LAB | Age: 62
End: 2022-01-28

## 2022-01-28 DIAGNOSIS — Z11.52 ENCOUNTER FOR SCREENING LABORATORY TESTING FOR COVID-19 VIRUS: ICD-10-CM

## 2022-01-28 NOTE — TELEPHONE ENCOUNTER
1. Caller Name: Jess Clayton Dk                          Call Back Number: [phone] (home)         How would the patient prefer to be contacted with a response:     Pt requesting an antibody lab order.

## 2022-01-31 ENCOUNTER — HOSPITAL ENCOUNTER (OUTPATIENT)
Facility: MEDICAL CENTER | Age: 62
End: 2022-01-31
Attending: NURSE PRACTITIONER
Payer: COMMERCIAL

## 2022-01-31 ENCOUNTER — TELEPHONE (OUTPATIENT)
Dept: MEDICAL GROUP | Facility: LAB | Age: 62
End: 2022-01-31

## 2022-01-31 ENCOUNTER — HOSPITAL ENCOUNTER (OUTPATIENT)
Dept: LAB | Facility: MEDICAL CENTER | Age: 62
End: 2022-01-31
Attending: NURSE PRACTITIONER
Payer: COMMERCIAL

## 2022-01-31 DIAGNOSIS — E03.8 OTHER SPECIFIED HYPOTHYROIDISM: ICD-10-CM

## 2022-01-31 DIAGNOSIS — Z00.00 PREVENTATIVE HEALTH CARE: ICD-10-CM

## 2022-01-31 DIAGNOSIS — Z11.52 ENCOUNTER FOR SCREENING LABORATORY TESTING FOR COVID-19 VIRUS: ICD-10-CM

## 2022-01-31 LAB
25(OH)D3 SERPL-MCNC: 26 NG/ML (ref 30–100)
ALBUMIN SERPL BCP-MCNC: 4.4 G/DL (ref 3.2–4.9)
ALBUMIN/GLOB SERPL: 1.8 G/DL
ALP SERPL-CCNC: 78 U/L (ref 30–99)
ALT SERPL-CCNC: 26 U/L (ref 2–50)
ANION GAP SERPL CALC-SCNC: 11 MMOL/L (ref 7–16)
AST SERPL-CCNC: 17 U/L (ref 12–45)
BASOPHILS # BLD AUTO: 0.8 % (ref 0–1.8)
BASOPHILS # BLD: 0.05 K/UL (ref 0–0.12)
BILIRUB SERPL-MCNC: 0.5 MG/DL (ref 0.1–1.5)
BUN SERPL-MCNC: 19 MG/DL (ref 8–22)
CALCIUM SERPL-MCNC: 9.6 MG/DL (ref 8.5–10.5)
CHLORIDE SERPL-SCNC: 104 MMOL/L (ref 96–112)
CHOLEST SERPL-MCNC: 236 MG/DL (ref 100–199)
CO2 SERPL-SCNC: 25 MMOL/L (ref 20–33)
CREAT SERPL-MCNC: 0.68 MG/DL (ref 0.5–1.4)
EOSINOPHIL # BLD AUTO: 0.13 K/UL (ref 0–0.51)
EOSINOPHIL NFR BLD: 2.1 % (ref 0–6.9)
ERYTHROCYTE [DISTWIDTH] IN BLOOD BY AUTOMATED COUNT: 43.6 FL (ref 35.9–50)
FASTING STATUS PATIENT QL REPORTED: NORMAL
GLOBULIN SER CALC-MCNC: 2.5 G/DL (ref 1.9–3.5)
GLUCOSE SERPL-MCNC: 96 MG/DL (ref 65–99)
HCT VFR BLD AUTO: 48.6 % (ref 37–47)
HDLC SERPL-MCNC: 57 MG/DL
HGB BLD-MCNC: 16.3 G/DL (ref 12–16)
IMM GRANULOCYTES # BLD AUTO: 0.01 K/UL (ref 0–0.11)
IMM GRANULOCYTES NFR BLD AUTO: 0.2 % (ref 0–0.9)
LDLC SERPL CALC-MCNC: 167 MG/DL
LYMPHOCYTES # BLD AUTO: 2.12 K/UL (ref 1–4.8)
LYMPHOCYTES NFR BLD: 33.7 % (ref 22–41)
MCH RBC QN AUTO: 31.8 PG (ref 27–33)
MCHC RBC AUTO-ENTMCNC: 33.5 G/DL (ref 33.6–35)
MCV RBC AUTO: 94.9 FL (ref 81.4–97.8)
MONOCYTES # BLD AUTO: 0.71 K/UL (ref 0–0.85)
MONOCYTES NFR BLD AUTO: 11.3 % (ref 0–13.4)
NEUTROPHILS # BLD AUTO: 3.28 K/UL (ref 2–7.15)
NEUTROPHILS NFR BLD: 51.9 % (ref 44–72)
NRBC # BLD AUTO: 0 K/UL
NRBC BLD-RTO: 0 /100 WBC
PLATELET # BLD AUTO: 343 K/UL (ref 164–446)
PMV BLD AUTO: 10.4 FL (ref 9–12.9)
POTASSIUM SERPL-SCNC: 5 MMOL/L (ref 3.6–5.5)
PROT SERPL-MCNC: 6.9 G/DL (ref 6–8.2)
RBC # BLD AUTO: 5.12 M/UL (ref 4.2–5.4)
SARS-COV-2 AB SERPL QL IA: NORMAL
SODIUM SERPL-SCNC: 140 MMOL/L (ref 135–145)
T4 FREE SERPL-MCNC: 2.53 NG/DL (ref 0.93–1.7)
TRIGL SERPL-MCNC: 60 MG/DL (ref 0–149)
TSH SERPL DL<=0.005 MIU/L-ACNC: 0.06 UIU/ML (ref 0.38–5.33)
WBC # BLD AUTO: 6.3 K/UL (ref 4.8–10.8)

## 2022-01-31 PROCEDURE — 80061 LIPID PANEL: CPT

## 2022-01-31 PROCEDURE — 80053 COMPREHEN METABOLIC PANEL: CPT

## 2022-01-31 PROCEDURE — 85025 COMPLETE CBC W/AUTO DIFF WBC: CPT

## 2022-01-31 PROCEDURE — 84439 ASSAY OF FREE THYROXINE: CPT

## 2022-01-31 PROCEDURE — 84443 ASSAY THYROID STIM HORMONE: CPT

## 2022-01-31 PROCEDURE — 82306 VITAMIN D 25 HYDROXY: CPT

## 2022-01-31 PROCEDURE — 86769 SARS-COV-2 COVID-19 ANTIBODY: CPT

## 2022-01-31 PROCEDURE — 36415 COLL VENOUS BLD VENIPUNCTURE: CPT

## 2022-01-31 RX ORDER — LEVOTHYROXINE SODIUM 112 UG/1
112 TABLET ORAL
Qty: 30 TABLET | Refills: 2 | Status: SHIPPED
Start: 2022-01-31 | End: 2022-03-06

## 2022-02-01 ENCOUNTER — TELEPHONE (OUTPATIENT)
Dept: MEDICAL GROUP | Facility: LAB | Age: 62
End: 2022-02-01

## 2022-02-01 NOTE — TELEPHONE ENCOUNTER
----- Message from LILLY Naik sent at 1/31/2022  4:57 PM PST -----  Please call Jess and let her know that her COVID testing is negative for antibodies -meaning she most likely has never had COVID-19.    Thank you!

## 2022-02-01 NOTE — TELEPHONE ENCOUNTER
----- Message from BESSY Rodas sent at 1/31/2022  2:33 PM PST -----  Please let Jess know that vitamin D is still low, if she has not already, please ask her to take 5000 international units daily.  Thyroid is extremely overmedicated which can lead to dangerous heart arrhythmias such as atrial fibrillation.  Did she cut down her thyroid replacement after her last blood work last year to 1/2 tablet 1 day/week and the entire tablet the other 6 days?  If so, I would like for her to decrease her dosage to 112 mcg every day.  Let me know and I will send this to the pharmacy.

## 2022-02-02 ENCOUNTER — TELEPHONE (OUTPATIENT)
Dept: MEDICAL GROUP | Facility: LAB | Age: 62
End: 2022-02-02

## 2022-02-02 NOTE — TELEPHONE ENCOUNTER
----- Message from BESSY Rodas sent at 2/2/2022 10:10 AM PST -----  Needs appt to discuss labs. thanks

## 2022-02-14 ENCOUNTER — OFFICE VISIT (OUTPATIENT)
Dept: MEDICAL GROUP | Facility: LAB | Age: 62
End: 2022-02-14
Payer: COMMERCIAL

## 2022-02-14 ENCOUNTER — HOSPITAL ENCOUNTER (EMERGENCY)
Facility: MEDICAL CENTER | Age: 62
End: 2022-02-14
Attending: EMERGENCY MEDICINE
Payer: COMMERCIAL

## 2022-02-14 ENCOUNTER — APPOINTMENT (OUTPATIENT)
Dept: RADIOLOGY | Facility: MEDICAL CENTER | Age: 62
End: 2022-02-14
Attending: EMERGENCY MEDICINE
Payer: COMMERCIAL

## 2022-02-14 VITALS
WEIGHT: 137 LBS | HEIGHT: 60 IN | RESPIRATION RATE: 20 BRPM | OXYGEN SATURATION: 94 % | TEMPERATURE: 97.6 F | HEART RATE: 69 BPM | BODY MASS INDEX: 26.9 KG/M2 | SYSTOLIC BLOOD PRESSURE: 167 MMHG | DIASTOLIC BLOOD PRESSURE: 89 MMHG

## 2022-02-14 VITALS
HEIGHT: 60 IN | SYSTOLIC BLOOD PRESSURE: 140 MMHG | OXYGEN SATURATION: 95 % | WEIGHT: 137 LBS | BODY MASS INDEX: 26.9 KG/M2 | RESPIRATION RATE: 16 BRPM | TEMPERATURE: 97.7 F | DIASTOLIC BLOOD PRESSURE: 88 MMHG

## 2022-02-14 DIAGNOSIS — E03.8 OTHER SPECIFIED HYPOTHYROIDISM: ICD-10-CM

## 2022-02-14 DIAGNOSIS — I48.91 ATRIAL FIBRILLATION WITH RAPID VENTRICULAR RESPONSE (HCC): ICD-10-CM

## 2022-02-14 DIAGNOSIS — E05.90 HYPERTHYROIDISM WITHOUT CRISIS: ICD-10-CM

## 2022-02-14 DIAGNOSIS — I48.91 ATRIAL FIBRILLATION WITH RVR (HCC): ICD-10-CM

## 2022-02-14 DIAGNOSIS — E55.9 VITAMIN D DEFICIENCY: ICD-10-CM

## 2022-02-14 LAB
ALBUMIN SERPL BCP-MCNC: 4.3 G/DL (ref 3.2–4.9)
ALBUMIN/GLOB SERPL: 1.5 G/DL
ALP SERPL-CCNC: 89 U/L (ref 30–99)
ALT SERPL-CCNC: 24 U/L (ref 2–50)
ANION GAP SERPL CALC-SCNC: 11 MMOL/L (ref 7–16)
AST SERPL-CCNC: 27 U/L (ref 12–45)
BASOPHILS # BLD AUTO: 0.6 % (ref 0–1.8)
BASOPHILS # BLD: 0.05 K/UL (ref 0–0.12)
BILIRUB SERPL-MCNC: 0.4 MG/DL (ref 0.1–1.5)
BUN SERPL-MCNC: 8 MG/DL (ref 8–22)
CALCIUM SERPL-MCNC: 9.1 MG/DL (ref 8.4–10.2)
CHLORIDE SERPL-SCNC: 103 MMOL/L (ref 96–112)
CO2 SERPL-SCNC: 25 MMOL/L (ref 20–33)
CREAT SERPL-MCNC: 0.6 MG/DL (ref 0.5–1.4)
EKG IMPRESSION: NORMAL
EKG IMPRESSION: NORMAL
EOSINOPHIL # BLD AUTO: 0.06 K/UL (ref 0–0.51)
EOSINOPHIL NFR BLD: 0.7 % (ref 0–6.9)
ERYTHROCYTE [DISTWIDTH] IN BLOOD BY AUTOMATED COUNT: 42 FL (ref 35.9–50)
GLOBULIN SER CALC-MCNC: 2.8 G/DL (ref 1.9–3.5)
GLUCOSE SERPL-MCNC: 109 MG/DL (ref 65–99)
HCT VFR BLD AUTO: 49.2 % (ref 37–47)
HGB BLD-MCNC: 16.8 G/DL (ref 12–16)
IMM GRANULOCYTES # BLD AUTO: 0.01 K/UL (ref 0–0.11)
IMM GRANULOCYTES NFR BLD AUTO: 0.1 % (ref 0–0.9)
LYMPHOCYTES # BLD AUTO: 2.81 K/UL (ref 1–4.8)
LYMPHOCYTES NFR BLD: 33.6 % (ref 22–41)
MCH RBC QN AUTO: 32.1 PG (ref 27–33)
MCHC RBC AUTO-ENTMCNC: 34.1 G/DL (ref 33.6–35)
MCV RBC AUTO: 93.9 FL (ref 81.4–97.8)
MONOCYTES # BLD AUTO: 0.66 K/UL (ref 0–0.85)
MONOCYTES NFR BLD AUTO: 7.9 % (ref 0–13.4)
NEUTROPHILS # BLD AUTO: 4.77 K/UL (ref 2–7.15)
NEUTROPHILS NFR BLD: 57.1 % (ref 44–72)
NRBC # BLD AUTO: 0 K/UL
NRBC BLD-RTO: 0 /100 WBC
NT-PROBNP SERPL IA-MCNC: 266 PG/ML (ref 0–125)
PLATELET # BLD AUTO: 327 K/UL (ref 164–446)
PMV BLD AUTO: 10 FL (ref 9–12.9)
POTASSIUM SERPL-SCNC: 3.6 MMOL/L (ref 3.6–5.5)
PROT SERPL-MCNC: 7.1 G/DL (ref 6–8.2)
RBC # BLD AUTO: 5.24 M/UL (ref 4.2–5.4)
SODIUM SERPL-SCNC: 139 MMOL/L (ref 135–145)
T4 FREE SERPL-MCNC: 2.11 NG/DL (ref 0.93–1.7)
TROPONIN T SERPL-MCNC: 7 NG/L (ref 6–19)
TSH SERPL DL<=0.005 MIU/L-ACNC: 0.53 UIU/ML (ref 0.38–5.33)
WBC # BLD AUTO: 8.4 K/UL (ref 4.8–10.8)

## 2022-02-14 PROCEDURE — 36415 COLL VENOUS BLD VENIPUNCTURE: CPT

## 2022-02-14 PROCEDURE — 83880 ASSAY OF NATRIURETIC PEPTIDE: CPT

## 2022-02-14 PROCEDURE — 84443 ASSAY THYROID STIM HORMONE: CPT

## 2022-02-14 PROCEDURE — 71045 X-RAY EXAM CHEST 1 VIEW: CPT

## 2022-02-14 PROCEDURE — 80053 COMPREHEN METABOLIC PANEL: CPT

## 2022-02-14 PROCEDURE — 85025 COMPLETE CBC W/AUTO DIFF WBC: CPT

## 2022-02-14 PROCEDURE — 96374 THER/PROPH/DIAG INJ IV PUSH: CPT

## 2022-02-14 PROCEDURE — 84484 ASSAY OF TROPONIN QUANT: CPT

## 2022-02-14 PROCEDURE — 700101 HCHG RX REV CODE 250: Performed by: EMERGENCY MEDICINE

## 2022-02-14 PROCEDURE — 99214 OFFICE O/P EST MOD 30 MIN: CPT | Performed by: NURSE PRACTITIONER

## 2022-02-14 PROCEDURE — A9270 NON-COVERED ITEM OR SERVICE: HCPCS | Performed by: EMERGENCY MEDICINE

## 2022-02-14 PROCEDURE — 700102 HCHG RX REV CODE 250 W/ 637 OVERRIDE(OP): Performed by: EMERGENCY MEDICINE

## 2022-02-14 PROCEDURE — 84439 ASSAY OF FREE THYROXINE: CPT

## 2022-02-14 PROCEDURE — 99284 EMERGENCY DEPT VISIT MOD MDM: CPT

## 2022-02-14 PROCEDURE — 93005 ELECTROCARDIOGRAM TRACING: CPT | Performed by: EMERGENCY MEDICINE

## 2022-02-14 RX ORDER — ACETAMINOPHEN 500 MG
500 TABLET ORAL EVERY 6 HOURS PRN
Status: SHIPPED | COMMUNITY
End: 2022-02-15

## 2022-02-14 RX ORDER — METOPROLOL SUCCINATE 25 MG/1
25 TABLET, EXTENDED RELEASE ORAL DAILY
Qty: 30 TABLET | Refills: 0 | Status: SHIPPED | OUTPATIENT
Start: 2022-02-14 | End: 2022-02-15 | Stop reason: SDUPTHER

## 2022-02-14 RX ORDER — METOPROLOL TARTRATE 1 MG/ML
5 INJECTION, SOLUTION INTRAVENOUS ONCE
Status: COMPLETED | OUTPATIENT
Start: 2022-02-14 | End: 2022-02-14

## 2022-02-14 RX ORDER — ASPIRIN 325 MG
325 TABLET ORAL DAILY
Qty: 30 TABLET | Refills: 1 | Status: ON HOLD | OUTPATIENT
Start: 2022-02-14 | End: 2022-08-21

## 2022-02-14 RX ADMIN — METOPROLOL TARTRATE 5 MG: 5 INJECTION, SOLUTION INTRAVENOUS at 16:29

## 2022-02-14 RX ADMIN — METOPROLOL TARTRATE 12.5 MG: 25 TABLET, FILM COATED ORAL at 17:58

## 2022-02-14 RX ADMIN — METOPROLOL TARTRATE 5 MG: 1 INJECTION, SOLUTION INTRAVENOUS at 16:33

## 2022-02-14 ASSESSMENT — FIBROSIS 4 INDEX
FIB4 SCORE: 0.61
FIB4 SCORE: 0.61

## 2022-02-14 NOTE — Clinical Note
Jess Roto was seen and treated in our emergency department on 2/14/2022.  She may return to work on 03/14/2022.  Ms. Root was seen in our emergency department today, and may to need time off over the next several weeks for recovery and follow-up appointments.  She is cleared to return to work when she feels well enough to do so.     If you have any questions or concerns, please don't hesitate to call.      Leandra Hunt M.D.

## 2022-02-14 NOTE — ASSESSMENT & PLAN NOTE
Chronic issue.  Recent lab work:   Component      Latest Ref Rng & Units 2018          12:04 PM 12:04 PM 10:21 AM 10:21 AM   TSH      0.380 - 5.330 uIU/mL 0.400   0.910   Free T-4      0.93 - 1.70 ng/dL  1.58 (H) 1.60 (H)      Component      Latest Ref Rng & Units 2020           9:40 AM  9:40 AM  9:15 AM  9:15 AM   TSH      0.380 - 5.330 uIU/mL  0.064 (L)  0.060 (L)   Free T-4      0.93 - 1.70 ng/dL 2.05 (H)  2.53 (H)    Levothyroxine lowered in  based on labs although she never returned to the lab to have her labs rechecked as advised.  Admits that she feels her heart race in stressful situations since her brother  10/2021 - uriah with dermatology, our office or around a stressful manager at work.  Denies dizziness, shortness of breath or chest pain.  BM's have reduced from tid to daily.  Denies hair or skin issues.  Energy level has been good.

## 2022-02-14 NOTE — PROGRESS NOTES
CC        HPI:  Jess is a 61-year-old established female here to follow-up on lab work.  Last seen by myself in 2020.  Overall feels well with the exception of feeling her heart racing periodically.    Continues to work.    Non-smoker.    Past medical history of hypothyroidism, vitamin D deficiency, basal cell carcinomas, allergies, arthritis and varicose veins.    Hypothyroidism  Chronic issue.  Recent lab work:   Component      Latest Ref Rng & Units 2018          12:04 PM 12:04 PM 10:21 AM 10:21 AM   TSH      0.380 - 5.330 uIU/mL 0.400   0.910   Free T-4      0.93 - 1.70 ng/dL  1.58 (H) 1.60 (H)      Component      Latest Ref Rng & Units 2020           9:40 AM  9:40 AM  9:15 AM  9:15 AM   TSH      0.380 - 5.330 uIU/mL  0.064 (L)  0.060 (L)   Free T-4      0.93 - 1.70 ng/dL 2.05 (H)  2.53 (H)    Levothyroxine lowered in  based on labs although she never returned to the lab to have her labs rechecked as advised.  Admits that she feels her heart race in stressful situations since her brother  10/2021 - uriah with dermatology, our office or around a stressful manager at work.  Denies dizziness, shortness of breath or chest pain.  BM's have reduced from tid to daily.  Denies hair or skin issues.  Energy level has been good.      Exam:  /88 (BP Location: Left arm, Patient Position: Sitting, BP Cuff Size: Adult)   Temp 36.5 °C (97.7 °F)   Resp 16   Ht 1.524 m (5')   Wt 62.1 kg (137 lb)   SpO2 95%   Well developed, well nourished female in no apparent distress -speaking comfortably in full sentences and does not appear short of breath.  Eyes: Conjunctivae and sclerae are clear and non-icteric. Pupils are equally round and reactive to light, extra-ocular movements intact.   Neck: Supple  CV: Heart rate is rapid and irregular  Pulm: Clear to auscultation.  Psychiatric: The patient is alert and oriented in all four spheres. Mood  is euthymic. Affect is appropriate for the situation.  Musculoskeletal: Gait is normal. Patient is able to transfer from sitting position to exam table without assistance.    A/P:    1. Atrial fibrillation with rapid ventricular response (HCC)  EKG - Clinic Performed   2. Other specified hypothyroidism  TSH    FREE THYROXINE   3. Vitamin D deficiency  VITAMIN D,25 HYDROXY     New diagnosis of A. fib with rapid ventricular response.  Discussed causes of A. fib, in her case likely related to being in a overmedicated hyperthyroid state.  Fortunately she did reduce her thyroid dosage 2 weeks ago via telephone based on labs.  She has felt her heart racing for the last several months without any other associated symptoms.  She was agreeable to going to the emergency department immediately from our office but preferred to go by private vehicle, her friend drove her - pt refused ambulance.  Excluding her heart rate, vital signs stable, she was ambulating around our office without any difficulties, has been driving around for several months with this sensation and is low risk for an acute event in a private vehicle on the way to the emergency department but discussed possibility of this.  She is agreeable to going to [facility] ER immediately.  ER was notified of patient arrival via transfer center telephone message.  She understands the importance of going directly to the emergency department and increased risk of heart attack/stroke with untreated atrial fibrillation.    After she is discharged from the hospital for treatment of new onset A. fib with rapid ventricular response, recommend rechecking TSH with T4 and vitamin D in about 4 to 6 weeks now that she is on a lower dosage of levothyroxine and daily vitamin D at 5000 international units daily.  Also encouraged her to follow-up with me after she is released from the hospital.  Discussed that she will also be followed by cardiology.

## 2022-02-15 ENCOUNTER — OFFICE VISIT (OUTPATIENT)
Dept: CARDIOLOGY | Facility: MEDICAL CENTER | Age: 62
End: 2022-02-15
Payer: COMMERCIAL

## 2022-02-15 VITALS
OXYGEN SATURATION: 94 % | RESPIRATION RATE: 16 BRPM | DIASTOLIC BLOOD PRESSURE: 78 MMHG | BODY MASS INDEX: 27.29 KG/M2 | HEIGHT: 60 IN | SYSTOLIC BLOOD PRESSURE: 128 MMHG | HEART RATE: 79 BPM | WEIGHT: 139 LBS

## 2022-02-15 DIAGNOSIS — I48.91 ATRIAL FIBRILLATION WITH RVR (HCC): ICD-10-CM

## 2022-02-15 DIAGNOSIS — E03.8 OTHER SPECIFIED HYPOTHYROIDISM: ICD-10-CM

## 2022-02-15 DIAGNOSIS — R03.0 WHITE COAT SYNDROME WITHOUT DIAGNOSIS OF HYPERTENSION: ICD-10-CM

## 2022-02-15 DIAGNOSIS — I48.0 PAROXYSMAL ATRIAL FIBRILLATION (HCC): ICD-10-CM

## 2022-02-15 PROCEDURE — 99214 OFFICE O/P EST MOD 30 MIN: CPT | Performed by: NURSE PRACTITIONER

## 2022-02-15 RX ORDER — METOPROLOL SUCCINATE 25 MG/1
25 TABLET, EXTENDED RELEASE ORAL DAILY
Qty: 30 TABLET | Refills: 3 | Status: SHIPPED | OUTPATIENT
Start: 2022-02-15 | End: 2022-03-07 | Stop reason: SDUPTHER

## 2022-02-15 RX ORDER — FUROSEMIDE 20 MG/1
20 TABLET ORAL PRN
Status: ON HOLD | COMMUNITY
End: 2022-08-21

## 2022-02-15 ASSESSMENT — FIBROSIS 4 INDEX: FIB4 SCORE: 1.06

## 2022-02-15 NOTE — ED NOTES
ERP at bedside. Pt agrees with plan of care discussed by ERP. AIDET acknowledged with patient. IV established. Blood sent to lab. Metoprolol given twice. Pt converted to SR from Afib. EKG repeated.  Mohit in low position, side rail up for pt safety. Call light within reach. Will continue to monitor.

## 2022-02-15 NOTE — ED TRIAGE NOTES
Chief Complaint   Patient presents with   • Rapid Heart Beat     new onset a-fib RVR   • Anxiety     was seen by PMD today for anxiety, EGK revealed new onset AFIB. Sent for further evaluation.      Pt converting in and out of SR to A-fib RVR.  BP (!) 175/97   Pulse (!) 170   Temp 36.6 °C (97.8 °F) (Temporal)   Resp 18   Ht 1.524 m (5')   Wt 62.1 kg (137 lb)   SpO2 97%   BMI 26.76 kg/m²

## 2022-02-15 NOTE — ED PROVIDER NOTES
ED Physician Note    Chief Complaint:   Atrial fibrillation with rapid ventricular response    HPI:  Jess Root is a very pleasant 61-year-old woman who presents to the emergency department for evaluation of atrial fibrillation with rapid ventricular response.  She was seen at her primary care clinic for lab work follow-up and blood pressure follow-up.  She is noted have a history of hypothyroid due to thyroidectomy from a likely precancerous or cancerous nodule.  She is currently taking Synthroid, however her free T4 levels have been elevated recently.  She states she has felt well, she states she has had episodes of palpitations for the past 4 months, though she seems to attribute them to anxiety.  She states she has had several stressful situations, and feels a little bit lightheaded, and feels her heart racing during anxious situations.  However she did not have any symptoms of palpitations or lightheadedness when she was in clinic, EKG performed in clinic found that she was in atrial fibrillation with rapid ventricular response, at that time she reported that she was completely asymptomatic.  She was not hypotensive, she is sent to the emergency department for further evaluation.  She has no prior history of atrial fibrillation, is not currently on any anticoagulation or antiplatelet agents.    Review of Systems:  See HPI for pertinent positives and negatives. All other systems negative.    Past Medical History:   has a past medical history of HSV-2 infection, Hyperlipidemia, Osteopenia, Pseudocholinesterase deficiency, and Thyroid disease (nodules - thyroidectomy).    Social History:  Social History     Tobacco Use   • Smoking status: Never Smoker   • Smokeless tobacco: Never Used   Vaping Use   • Vaping Use: Never used   Substance and Sexual Activity   • Alcohol use: No   • Drug use: No   • Sexual activity: Not on file     Comment: , two kids       Surgical History:   has a past surgical  history that includes gyn surgery; thyroidectomy; vein stripping; abdominal hysterectomy total (205); and meniscus repair.    Current Medications:  Home Medications     Reviewed by Griselda Portillo (Pharmacy Tech) on 02/14/22 at 1606  Med List Status: Complete   Medication Last Dose Status   acetaminophen (TYLENOL) 500 MG Tab 2/14/2022 Active   levothyroxine (SYNTHROID) 112 MCG Tab 2/14/2022 Active   potassium chloride ER (KLOR-CON) 10 MEQ tablet > 1 week Active   Tetrahydrozoline HCl (VISINE OP) 2/14/2022 Active                Allergies:  Allergies   Allergen Reactions   • Other Misc      Anesthesia-- pseudocholinesterace   • Succinylcholine      PSEUDOCHOLINE DEFICIENCY       Physical Exam:  Vital Signs: BP (!) 167/89   Pulse 69   Temp 36.4 °C (97.6 °F)   Resp 20   Ht 1.524 m (5')   Wt 62.1 kg (137 lb)   SpO2 94%   BMI 26.76 kg/m²   Constitutional: Alert, no acute distress  HENT: Normocephalic, mask in place  Eyes: Pupils equal and reactive, normal conjunctiva  Neck: Supple, normal range of motion, no stridor  Cardiovascular: Extremities are warm and well perfused, no murmur appreciated, normal cardiac auscultation, tachycardic rate, normotensive  Pulmonary: No respiratory distress, normal work of breathing, no accessory muscule usage, breath sounds clear and equal bilaterally  Abdomen: Soft, non-distended, non-tender to palpation, no peritoneal signs  Skin: Warm, dry, no rashes or lesions  Musculoskeletal: Normal range of motion in all extremities, no swelling or deformity noted  Neurologic: Alert, oriented, normal speech, normal motor function  Psychiatric: Normal and appropriate mood and affect    Medical records reviewed for continuity of care.  Family medicine APN note reviewed from earlier today.  She was seen for follow-up and lab work, was most recently seen in clinic August 2020.  Noted that she feels well with the exception of periodic episodes of palpitations.  EKG in clinic demonstrated A.  fib with rapid ventricular response.  This is a new diagnosis.  Assessment and plan notes that this may be due to being overmedicated and currently hyperthyroid.    EKG: Rate 142, atrial flutter, no significant ST elevation, appears to have 1 beat of normal sinus rhythm on initial EKG.  We have no prior EKGs in our system.  Repeat EKG demonstrates sinus rhythm, with intermittent PVCs, normal voltage, no ST elevation or depression, no pathologic T wave inversions, EKG was obtained after 10 mg of metoprolol were administered.    Labs:  Labs Reviewed   CBC WITH DIFFERENTIAL - Abnormal; Notable for the following components:       Result Value    Hemoglobin 16.8 (*)     Hematocrit 49.2 (*)     All other components within normal limits   COMP METABOLIC PANEL - Abnormal; Notable for the following components:    Glucose 109 (*)     All other components within normal limits   PROBRAIN NATRIURETIC PEPTIDE, NT - Abnormal; Notable for the following components:    NT-proBNP 266 (*)     All other components within normal limits   FREE THYROXINE - Abnormal; Notable for the following components:    Free T-4 2.11 (*)     All other components within normal limits   TROPONIN   TSH WITH REFLEX TO FT4   ESTIMATED GFR       Radiology:  DX-CHEST-PORTABLE (1 VIEW)   Final Result      No acute cardiopulmonary abnormality.              ED Medications Administered:  Medications   Metoprolol Tartrate (LOPRESSOR) injection 5 mg (5 mg Intravenous Given 2/14/22 1629)   Metoprolol Tartrate (LOPRESSOR) injection 5 mg (5 mg Intravenous Given 2/14/22 1633)   metoprolol tartrate (LOPRESSOR) tablet 12.5 mg (12.5 mg Oral Given 2/14/22 1758)       Differential diagnosis:  Atrial fibrillation, RVR, hypothyroid, less likely thyroid storm, electrolyte abnormality    MDM:  Ms. Root presents to the emergency department today for evaluation of atrial fibrillation with rapid ventricular response.  On arrival to the emergency department she does intermittently  convert to normal sinus rhythm with rate in the low 100s, without intervention.  She reports no prior history of atrial fibrillation, she does have hypothyroid, and free T4 levels have been slightly elevated recently. She has no fever, heart rate is 170, atrial fibrillation with rapid ventricular response, no evidence of congestive heart failure, though atrial fibrillation present.  She has no agitation, no delirium, no evidence of GI or hepatic dysfunction.  Doubt thyroid storm.  She was not having symptoms when her A. fib with RVR was found in clinic today, states that she has had palpitations intermittently since October, unable to determine the exact time of onset of her atrial fibrillation.    On laboratory evaluation free T4 is 2.11, which is lower than prior value of 2.53.  High sensitive troponin is negative, proBNP is slightly above normal reference range at 266.  Electrolytes are within normal limits.  She does not have hypokalemia.  She has a normal white blood count, no evidence of infectious etiology.    Lab work is reassuring.  She was given 10 mg of metoprolol in the emergency department for rate control, and remained in sinus rhythm.  She is currently taking 325 mg of aspirin daily.  I discussed the option of anticoagulation, however if she is on metoprolol, there is a good chance that she will remain in sinus rhythm.  Her [id number] score is 1 indicating low risk of stroke/TIA/systemic embolism.  I believe it is reasonable to avoid anticoagulation, keep her on her daily aspirin, and have her follow-up closely with cardiology and primary care to monitor cardiac rhythm.  I discussed her presentation with Dr. Jean, cardiologist, who is in agreement that [id number] score of 1 does not necessitate initiation of anticoagulation.  He kindly agrees to follow-up closely in clinic to complete her outpatient work-up.  She is counseled to call her primary care nurse practitioner to discuss her visit today  and continue to monitor her thyroid levels.  She will contact cardiology clinic at the opening of business hours to schedule follow-up appointment.  She is discharged home on metoprolol.  Return precautions were discussed with the patient, and provided in written form with the patient's discharge instructions.       Personal protective equipment including N95 surgical respirator, goggles, and gloves were used during this encounter.       Disposition:  Discharge home in stable condition      Final Impression:  1. Atrial fibrillation with RVR (HCC)    2. Hyperthyroidism without crisis        Electronically signed by: Leandra Hunt MD, 2/14/2022 11:19 PM

## 2022-02-15 NOTE — PROGRESS NOTES
Cardiology Brief Note    Patient in ER at [facility] with new onset PAF, in and out in ER. CHADSVASc=1 but possibly undiagnosed HTN. Hx of thyroid dysfunction and recently hyper, no with normal TSH.     Will arrange outpatient cardiology follow,   Echo and Zio patch.     Also recommend home BP monitoring, Aspirin and Metoprolol.

## 2022-02-15 NOTE — DISCHARGE INSTRUCTIONS
Please follow-up with the cardiologist listed above.  Return to the emergency department if you develop any new or worsening symptoms including worsening chest pain, palpitations, fevers, nausea or vomiting, lightheadedness, or any other concern.  Please follow-up with your primary care practitioner, or cardiology within 24 hours for repeat EKG.  Please take the medication as prescribed.    If you are able to get a home blood pressure cuff and record your blood pressures in the morning and evening, this will be helpful information for the cardiologist.  Call tomorrow morning to schedule a cardiology appointment, let them know that Dr. Hunt, the emergency physician, spoke with Dr. Jean, the cardiologist who would like to see you in clinic for follow-up.    Please begin taking 325 mg of aspirin daily.

## 2022-02-16 ENCOUNTER — TELEPHONE (OUTPATIENT)
Dept: CARDIOLOGY | Facility: MEDICAL CENTER | Age: 62
End: 2022-02-16
Payer: COMMERCIAL

## 2022-02-16 DIAGNOSIS — I48.0 PAROXYSMAL ATRIAL FIBRILLATION (HCC): ICD-10-CM

## 2022-02-16 ASSESSMENT — ENCOUNTER SYMPTOMS
MYALGIAS: 0
LOSS OF CONSCIOUSNESS: 0
COUGH: 0
BRUISES/BLEEDS EASILY: 0
PND: 0
INSOMNIA: 0
CHILLS: 0
DIZZINESS: 0
SHORTNESS OF BREATH: 0
PALPITATIONS: 1
FEVER: 0
NAUSEA: 0
HEADACHES: 0
ORTHOPNEA: 0
ABDOMINAL PAIN: 0

## 2022-02-16 NOTE — TELEPHONE ENCOUNTER
----- Message from BESSY Alexis sent at 2/16/2022  2:23 PM PST -----  Regarding: Can we get a ZioPatch  I put in an order for ZioPatch for this patient.  Is it possible to get it scheduled?    Thank you!!  AB

## 2022-02-16 NOTE — TELEPHONE ENCOUNTER
Chart Prep  Called patient to schedule Cardiac Event Monitor per DELMA Alexis to call back at earliest convenience to schedule appointment ASAP. PAUL to  @ [phone].

## 2022-02-16 NOTE — PROGRESS NOTES
Chief Complaint   Patient presents with   • New Patient   • Atrial Fibrillation   • Hypertension   • Hypothyroidism       Subjective     NEW PATIENT CONSULTATION    Jess Root is a 61 y.o. female who presents today for ER follow-up of atrial fibrillation with RVR.    Jess is a 61 year old female with history of hypothyroidism and hyperlipidemia, who, while at her PCP's office yesterday, developed some palpitations and fast HR. EKG was done, which showed AFib with RVR (HR 150bpm). She was told to go to the ER.    EKG in the ER showed atrial flutter. She was treated with Toprol XL, and started on ASA 325mg once once daily (as she is [id number] score 1). She was told to follow-up her.    She does have a history of thyroidectomy, complete hysterectomy and vein stripping.    Mother  of a CVA, was on a blood thinner.  Father  of CAD, history of CABG    She does not smoke, drink alcohol and use any other drugs. She drinks maybe once cup of coffee per day.    Past Medical History:   Diagnosis Date   • HSV-2 infection    • Hyperlipidemia    • Osteopenia    • Pseudocholinesterase deficiency    • Thyroid disease nodules - thyroidectomy     Past Surgical History:   Procedure Laterality Date   • VEIN STRIPPING     • ABDOMINAL HYSTERECTOMY TOTAL     • MENISCUS REPAIR      right knee 2017 - Dr. Johnson   • THYROIDECTOMY       Family History   Problem Relation Age of Onset   • Diabetes Mother    • Heart Disease Mother      Social History     Socioeconomic History   • Marital status: Legally      Spouse name: Not on file   • Number of children: Not on file   • Years of education: Not on file   • Highest education level: Not on file   Occupational History   • Not on file   Tobacco Use   • Smoking status: Never Smoker   • Smokeless tobacco: Never Used   Vaping Use   • Vaping Use: Never used   Substance and Sexual Activity   • Alcohol use: No   • Drug use: No   • Sexual activity: Not on file      Comment: , two kids   Other Topics Concern   • Not on file   Social History Narrative   • Not on file     Social Determinants of Health     Financial Resource Strain:    • Difficulty of Paying Living Expenses: Not on file   Food Insecurity:    • Worried About Running Out of Food in the Last Year: Not on file   • Ran Out of Food in the Last Year: Not on file   Transportation Needs:    • Lack of Transportation (Medical): Not on file   • Lack of Transportation (Non-Medical): Not on file   Physical Activity:    • Days of Exercise per Week: Not on file   • Minutes of Exercise per Session: Not on file   Stress:    • Feeling of Stress : Not on file   Social Connections:    • Frequency of Communication with Friends and Family: Not on file   • Frequency of Social Gatherings with Friends and Family: Not on file   • Attends Voodoo Services: Not on file   • Active Member of Clubs or Organizations: Not on file   • Attends Club or Organization Meetings: Not on file   • Marital Status: Not on file   Intimate Partner Violence:    • Fear of Current or Ex-Partner: Not on file   • Emotionally Abused: Not on file   • Physically Abused: Not on file   • Sexually Abused: Not on file   Housing Stability:    • Unable to Pay for Housing in the Last Year: Not on file   • Number of Places Lived in the Last Year: Not on file   • Unstable Housing in the Last Year: Not on file     Allergies   Allergen Reactions   • Other Misc      Anesthesia-- pseudocholinesterace   • Succinylcholine      PSEUDOCHOLINE DEFICIENCY     Outpatient Encounter Medications as of 2/15/2022   Medication Sig Dispense Refill   • furosemide (LASIX) 20 MG Tab Take 20 mg by mouth as needed.     • metoprolol SR (TOPROL XL) 25 MG TABLET SR 24 HR Take 1 Tablet by mouth every day. 30 Tablet 3   • Tetrahydrozoline HCl (VISINE OP) Administer 1 Drop into both eyes 2 times a day as needed (Per pt for itchy eyes and dry eye).     • aspirin (ASA) 325 MG Tab Take 1 Tablet by  mouth every day. 30 Tablet 1   • levothyroxine (SYNTHROID) 112 MCG Tab Take 1 Tablet by mouth every morning on an empty stomach. 30 Tablet 2   • potassium chloride ER (KLOR-CON) 10 MEQ tablet TAKE 1 TO 2 TABLETS BY MOUTH ONCE DAILY (Patient taking differently: Take 10-20 mEq by mouth as needed (Per pt, when she feels like she needs to take one).) 30 Tablet 4   • [DISCONTINUED] NON SPECIFIED every day. [employer] Prime  Promotes cellular energy     • [DISCONTINUED] acetaminophen (TYLENOL) 500 MG Tab Take 500 mg by mouth every 6 hours as needed for Moderate Pain.     • [DISCONTINUED] metoprolol SR (TOPROL XL) 25 MG TABLET SR 24 HR Take 1 Tablet by mouth every day. 30 Tablet 0     No facility-administered encounter medications on file as of 2/15/2022.     Review of Systems   Constitutional: Negative for chills and fever.   HENT: Negative for congestion.    Respiratory: Negative for cough and shortness of breath.    Cardiovascular: Positive for palpitations. Negative for chest pain, orthopnea, leg swelling and PND.   Gastrointestinal: Negative for abdominal pain and nausea.   Musculoskeletal: Negative for myalgias.   Skin: Negative for rash.   Neurological: Negative for dizziness, loss of consciousness and headaches.   Endo/Heme/Allergies: Does not bruise/bleed easily.   Psychiatric/Behavioral: The patient does not have insomnia.               Objective     /78 (BP Location: Left arm, Patient Position: Sitting, BP Cuff Size: Adult)   Pulse 79   Resp 16   Ht 1.524 m (5')   Wt 63 kg (139 lb)   SpO2 94%   BMI 27.15 kg/m²     Physical Exam  Constitutional:       Appearance: She is well-developed.   HENT:      Head: Normocephalic.   Neck:      Vascular: No JVD.      Comments: Scar of previous thyroidectomy.  Cardiovascular:      Rate and Rhythm: Normal rate and regular rhythm.      Heart sounds: Normal heart sounds.   Pulmonary:      Effort: Pulmonary effort is normal. No respiratory distress.      Breath sounds:  Normal breath sounds. No wheezing or rales.   Abdominal:      General: Bowel sounds are normal. There is no distension.      Palpations: Abdomen is soft.      Tenderness: There is no abdominal tenderness.   Musculoskeletal:         General: Normal range of motion.      Cervical back: Normal range of motion and neck supple.   Skin:     General: Skin is warm and dry.      Findings: No rash.   Neurological:      Mental Status: She is alert and oriented to person, place, and time.       10 year ASCVD risk: 4.91%    FINDINGS OF CTCS OF 9/1/20202:  Coronary calcification:  LMA - 4.2  LCX - 0.0  LAD - 25.5  RCA - 0.0  Total Calcium Score: 29.7    Lab Results   Component Value Date/Time    CHOLSTRLTOT 236 (H) 01/31/2022 09:15 AM     (H) 01/31/2022 09:15 AM    HDL 57 01/31/2022 09:15 AM    TRIGLYCERIDE 60 01/31/2022 09:15 AM       Component      Latest Ref Rng & Units 2/14/2022 2/14/2022 2/14/2022           3:53 PM  3:53 PM  3:53 PM   Troponin T      6 - 19 ng/L 7     TSH      0.380 - 5.330 uIU/mL   0.529   NT-proBNP      0 - 125 pg/mL  266 (H)      Lab Results   Component Value Date/Time    WBC 8.4 02/14/2022 03:53 PM    RBC 5.24 02/14/2022 03:53 PM    HEMOGLOBIN 16.8 (H) 02/14/2022 03:53 PM    HEMATOCRIT 49.2 (H) 02/14/2022 03:53 PM    MCV 93.9 02/14/2022 03:53 PM    MCH 32.1 02/14/2022 03:53 PM    MCHC 34.1 02/14/2022 03:53 PM    MPV 10.0 02/14/2022 03:53 PM      Lab Results   Component Value Date/Time    SODIUM 139 02/14/2022 03:53 PM    POTASSIUM 3.6 02/14/2022 03:53 PM    CHLORIDE 103 02/14/2022 03:53 PM    CO2 25 02/14/2022 03:53 PM    GLUCOSE 109 (H) 02/14/2022 03:53 PM    BUN 8 02/14/2022 03:53 PM    CREATININE 0.60 02/14/2022 03:53 PM              Assessment & Plan     1. Paroxysmal atrial fibrillation (HCC)  EC-ECHOCARDIOGRAM COMPLETE W/O CONT    metoprolol SR (TOPROL XL) 25 MG TABLET SR 24 HR   2. Atrial fibrillation with RVR (HCC)  metoprolol SR (TOPROL XL) 25 MG TABLET SR 24 HR   3. White coat syndrome  without diagnosis of hypertension     4. Other specified hypothyroidism         Medical Decision Making: Today's Assessment/Status/Plan:      1. Paroxysmal atrial fibrillation, in sinus rhythm on Toprol XL 25mg, possibly due to labile thyroid values. Patient and her PCP are working to get correct thyroid dose.  She is on ASA 325mg once daily, as she is [id number] score 1. I would like to do an echocardiogram, to assess heart structure and function.    2. White coat hypertension, with BP normal today, probably due to addition of Toprol XL.    3. Hypothyroidism, treated, followed by PCP.    4. Elevated lipids. Her 10 year ASCVD risk is 4.91%. Discussed dietary changes to help try to lower lipids. We will follow this over time. She has had a CTCS in September 2020, with a total score of 29.7. For now, will work on dietary changes.    As above, continue ASA and Toprol XL. Proceed with echo. We will follow-up after that.    Thank you for allowing us to participate in the care of this patient.

## 2022-02-17 ENCOUNTER — TELEPHONE (OUTPATIENT)
Dept: CARDIOLOGY | Facility: MEDICAL CENTER | Age: 62
End: 2022-02-17

## 2022-02-17 ENCOUNTER — TELEPHONE (OUTPATIENT)
Dept: MEDICAL GROUP | Facility: LAB | Age: 62
End: 2022-02-17

## 2022-02-17 ENCOUNTER — APPOINTMENT (OUTPATIENT)
Dept: CARDIOLOGY | Facility: MEDICAL CENTER | Age: 62
End: 2022-02-17
Payer: COMMERCIAL

## 2022-02-17 NOTE — TELEPHONE ENCOUNTER
At this point she does not need to spend money on brand name, it is more about closely rechecking her thyroid lab work.  She was overmedicated in 2020 and never returned to recheck labs in 2021, understandable during the pandemic.  I am sure at this point she will be more diligent about rechecking thyroid lab work frequently.

## 2022-02-17 NOTE — TELEPHONE ENCOUNTER
1. Caller Name: Jess oRot                          Call Back Number: [phone] (home)         How would the patient prefer to be contacted with a response:   Okay to leave a message      Pt wanted to inform you she was seen after her APPT with you  and will be getting an Echo and a heart monitor on Tuesday. Her Cardiologist believes this was regarding her thyroid medication and she was in hyper mode. If she is to continue her medication she would like your thoughts on the BRAND name

## 2022-02-17 NOTE — TELEPHONE ENCOUNTER
>Patient enrolled in 14 day ZioXT Patch program per Alisa Fleming.    >In clinic hookup at [facility].    >Currently pending EOS.

## 2022-02-28 ENCOUNTER — HOSPITAL ENCOUNTER (OUTPATIENT)
Dept: CARDIOLOGY | Facility: MEDICAL CENTER | Age: 62
End: 2022-02-28
Attending: NURSE PRACTITIONER
Payer: COMMERCIAL

## 2022-02-28 ENCOUNTER — TELEPHONE (OUTPATIENT)
Dept: MEDICAL GROUP | Facility: LAB | Age: 62
End: 2022-02-28
Payer: COMMERCIAL

## 2022-02-28 DIAGNOSIS — E03.8 OTHER SPECIFIED HYPOTHYROIDISM: ICD-10-CM

## 2022-02-28 DIAGNOSIS — I48.0 PAROXYSMAL ATRIAL FIBRILLATION (HCC): ICD-10-CM

## 2022-02-28 LAB
LV EJECT FRACT  99904: 60
LV EJECT FRACT MOD 2C 99903: 63.61
LV EJECT FRACT MOD 4C 99902: 55.61
LV EJECT FRACT MOD BP 99901: 56.98

## 2022-02-28 PROCEDURE — 93306 TTE W/DOPPLER COMPLETE: CPT

## 2022-02-28 PROCEDURE — 93306 TTE W/DOPPLER COMPLETE: CPT | Mod: 26 | Performed by: INTERNAL MEDICINE

## 2022-02-28 NOTE — TELEPHONE ENCOUNTER
Patient called and LVM. Wants to know if you want her to recheck her thyroid lab before she see's Cardiology on 3/7?

## 2022-02-28 NOTE — TELEPHONE ENCOUNTER
Yes, I would recommend she go Friday afternoon or Saturday morning.  Orders are in the computer. thanks

## 2022-03-01 ENCOUNTER — TELEPHONE (OUTPATIENT)
Dept: CARDIOLOGY | Facility: MEDICAL CENTER | Age: 62
End: 2022-03-01
Payer: COMMERCIAL

## 2022-03-01 NOTE — TELEPHONE ENCOUNTER
Called pt [phone]  No answer, left DETAILED VM  Regarding echo results and next OV appt with AB on 3/7/22

## 2022-03-01 NOTE — TELEPHONE ENCOUNTER
----- Message from BESSY Alexis sent at 3/1/2022 11:19 AM PST -----  Echo is normal, except for moderately dilated LA, which is consistent with AFib.  I can discuss further with her at FU next Monday.  Thanks, AB

## 2022-03-04 ENCOUNTER — HOSPITAL ENCOUNTER (OUTPATIENT)
Dept: LAB | Facility: MEDICAL CENTER | Age: 62
End: 2022-03-04
Attending: NURSE PRACTITIONER
Payer: COMMERCIAL

## 2022-03-04 DIAGNOSIS — E55.9 VITAMIN D DEFICIENCY: ICD-10-CM

## 2022-03-04 DIAGNOSIS — E03.8 OTHER SPECIFIED HYPOTHYROIDISM: ICD-10-CM

## 2022-03-04 LAB
25(OH)D3 SERPL-MCNC: 32 NG/ML (ref 30–100)
T4 FREE SERPL-MCNC: 1.92 NG/DL (ref 0.93–1.7)
TSH SERPL DL<=0.005 MIU/L-ACNC: 0.38 UIU/ML (ref 0.38–5.33)

## 2022-03-04 PROCEDURE — 84439 ASSAY OF FREE THYROXINE: CPT

## 2022-03-04 PROCEDURE — 82306 VITAMIN D 25 HYDROXY: CPT

## 2022-03-04 PROCEDURE — 36415 COLL VENOUS BLD VENIPUNCTURE: CPT

## 2022-03-04 PROCEDURE — 84443 ASSAY THYROID STIM HORMONE: CPT

## 2022-03-06 DIAGNOSIS — E03.8 OTHER SPECIFIED HYPOTHYROIDISM: ICD-10-CM

## 2022-03-06 RX ORDER — LEVOTHYROXINE SODIUM 112 UG/1
TABLET ORAL
Qty: 901 TABLET | Refills: 1
Start: 2022-03-06 | End: 2022-05-18

## 2022-03-07 ENCOUNTER — TELEPHONE (OUTPATIENT)
Dept: MEDICAL GROUP | Facility: LAB | Age: 62
End: 2022-03-07

## 2022-03-07 ENCOUNTER — OFFICE VISIT (OUTPATIENT)
Dept: CARDIOLOGY | Facility: MEDICAL CENTER | Age: 62
End: 2022-03-07
Payer: COMMERCIAL

## 2022-03-07 VITALS
HEIGHT: 60 IN | WEIGHT: 135 LBS | RESPIRATION RATE: 16 BRPM | BODY MASS INDEX: 26.5 KG/M2 | OXYGEN SATURATION: 96 % | DIASTOLIC BLOOD PRESSURE: 78 MMHG | SYSTOLIC BLOOD PRESSURE: 122 MMHG | HEART RATE: 60 BPM

## 2022-03-07 DIAGNOSIS — I80.01 THROMBOPHLEBITIS OF SUPERFICIAL VEINS OF RIGHT LOWER EXTREMITY: ICD-10-CM

## 2022-03-07 DIAGNOSIS — I48.91 ATRIAL FIBRILLATION WITH RVR (HCC): ICD-10-CM

## 2022-03-07 DIAGNOSIS — I83.93 VARICOSE VEINS OF BOTH LOWER EXTREMITIES, UNSPECIFIED WHETHER COMPLICATED: ICD-10-CM

## 2022-03-07 DIAGNOSIS — I48.0 PAROXYSMAL ATRIAL FIBRILLATION (HCC): ICD-10-CM

## 2022-03-07 DIAGNOSIS — E03.8 OTHER SPECIFIED HYPOTHYROIDISM: ICD-10-CM

## 2022-03-07 PROCEDURE — 99214 OFFICE O/P EST MOD 30 MIN: CPT | Performed by: NURSE PRACTITIONER

## 2022-03-07 RX ORDER — METOPROLOL SUCCINATE 25 MG/1
25 TABLET, EXTENDED RELEASE ORAL DAILY
Qty: 90 TABLET | Refills: 3 | Status: SHIPPED | OUTPATIENT
Start: 2022-03-07 | End: 2022-06-06 | Stop reason: SDUPTHER

## 2022-03-07 ASSESSMENT — FIBROSIS 4 INDEX: FIB4 SCORE: 1.06

## 2022-03-07 ASSESSMENT — ENCOUNTER SYMPTOMS
LOSS OF CONSCIOUSNESS: 0
FEVER: 0
BRUISES/BLEEDS EASILY: 0
NAUSEA: 0
CHILLS: 0
INSOMNIA: 0
ABDOMINAL PAIN: 0
ORTHOPNEA: 0
MYALGIAS: 0
DIZZINESS: 0
PALPITATIONS: 1
SHORTNESS OF BREATH: 0
PND: 0
HEADACHES: 0
COUGH: 0

## 2022-03-07 NOTE — PROGRESS NOTES
Chief Complaint   Patient presents with   • Atrial Fibrillation     F/V Dx: Paroxysmal atrial fibrillation (HCC)   • Follow-Up       Subjective     Jess Root is a 61 y.o. female who presents today for three week follow-up of new onset atrial fibrillation.    Jess is a 61 year old female with history of hypothyroidism and hyperlipidemia, who, while at her PCP's office yesterday, developed some palpitations and fast HR. EKG was done, which showed AFib with RVR (HR 150bpm). She was told to go to the ER. EKG in the ER showed atrial flutter. She was treated with Toprol XL, and started on ASA 325mg once once daily (as she is [id number] score 1).     I saw her on 2/15/2022 for evaluation; echocardiogram and ZioPatch were ordered. She just returned the ZioPatch last Thursday; echocardiogram was done on 2/28/2022. I told her she could use additional Toprol XL 12.5-25mg PRN sustained palpitations and/or fast HR (>90-100bpm).     She is here today for follow-up. Generally, she is doing well. She occasionally does have some palpitations, but HR is in the 60s when she checks it. She does have some muscle soreness in her back, but no chest pain, pressure or discomfort; no shortness of breath, orthopnea or PND; no dizziness or syncope; no LE edema.    She does have some varicose veins, and recently had a LE US done; there was no evidence of DVT, but some superficial thrombophlebitis. She has been referred to vascular specialist.    Past Medical History:   Diagnosis Date   • Atrial fibrillation (HCC) 02/2022    Echocardiogram with normal LV size, LVEF 60%. Moderately dilated LA. Trace MR, mild AI.   • HSV-2 infection    • Hyperlipidemia    • Osteopenia    • Pseudocholinesterase deficiency    • Thyroid disease nodules - thyroidectomy     Past Surgical History:   Procedure Laterality Date   • VEIN STRIPPING  2005   • ABDOMINAL HYSTERECTOMY TOTAL  2005   • MENISCUS REPAIR      right knee 9/2017 - Dr. Johnson   •  THYROIDECTOMY       Family History   Problem Relation Age of Onset   • Diabetes Mother    • Heart Disease Mother    • Stroke Mother    • Heart Disease Father      Social History     Socioeconomic History   • Marital status: Legally      Spouse name: Not on file   • Number of children: Not on file   • Years of education: Not on file   • Highest education level: Not on file   Occupational History   • Not on file   Tobacco Use   • Smoking status: Never Smoker   • Smokeless tobacco: Never Used   Vaping Use   • Vaping Use: Never used   Substance and Sexual Activity   • Alcohol use: No   • Drug use: No   • Sexual activity: Not on file     Comment: , two kids   Other Topics Concern   • Not on file   Social History Narrative   • Not on file     Social Determinants of Health     Financial Resource Strain: Not on file   Food Insecurity: Not on file   Transportation Needs: Not on file   Physical Activity: Not on file   Stress: Not on file   Social Connections: Not on file   Intimate Partner Violence: Not on file   Housing Stability: Not on file     Allergies   Allergen Reactions   • Other Misc      Anesthesia-- pseudocholinesterace   • Succinylcholine      PSEUDOCHOLINE DEFICIENCY     Outpatient Encounter Medications as of 3/7/2022   Medication Sig Dispense Refill   • metoprolol SR (TOPROL XL) 25 MG TABLET SR 24 HR Take 1 Tablet by mouth every day. 90 Tablet 3   • levothyroxine (SYNTHROID) 112 MCG Tab Take one pill 6 d per week, skip the 7th days. 901 Tablet 1   • furosemide (LASIX) 20 MG Tab Take 20 mg by mouth as needed.     • aspirin (ASA) 325 MG Tab Take 1 Tablet by mouth every day. 30 Tablet 1   • potassium chloride ER (KLOR-CON) 10 MEQ tablet TAKE 1 TO 2 TABLETS BY MOUTH ONCE DAILY (Patient taking differently: Take 10-20 mEq by mouth as needed (Per pt, when she feels like she needs to take one).) 30 Tablet 4   • [DISCONTINUED] metoprolol SR (TOPROL XL) 25 MG TABLET SR 24 HR Take 1 Tablet by mouth every  day. 30 Tablet 3   • [DISCONTINUED] Tetrahydrozoline HCl (VISINE OP) Administer 1 Drop into both eyes 2 times a day as needed (Per pt for itchy eyes and dry eye).       No facility-administered encounter medications on file as of 3/7/2022.     Review of Systems   Constitutional: Negative for chills and fever.   HENT: Negative for congestion.    Respiratory: Negative for cough and shortness of breath.    Cardiovascular: Positive for palpitations. Negative for chest pain, orthopnea, leg swelling and PND.        Improved on Toprol XL.   Gastrointestinal: Negative for abdominal pain and nausea.   Musculoskeletal: Negative for myalgias.   Skin: Negative for rash.   Neurological: Negative for dizziness, loss of consciousness and headaches.   Endo/Heme/Allergies: Does not bruise/bleed easily.   Psychiatric/Behavioral: The patient does not have insomnia.               Objective     /78 (BP Location: Left arm, Patient Position: Sitting, BP Cuff Size: Adult)   Pulse 60   Resp 16   Ht 1.524 m (5')   Wt 61.2 kg (135 lb)   SpO2 96%   BMI 26.37 kg/m²     Physical Exam  Constitutional:       Appearance: She is well-developed.   HENT:      Head: Normocephalic.   Neck:      Vascular: No JVD.      Comments: Scar of previous thyroidectomy.  Cardiovascular:      Rate and Rhythm: Normal rate and regular rhythm.      Heart sounds: Normal heart sounds.   Pulmonary:      Effort: Pulmonary effort is normal. No respiratory distress.      Breath sounds: Normal breath sounds. No wheezing or rales.   Abdominal:      General: Bowel sounds are normal. There is no distension.      Palpations: Abdomen is soft.      Tenderness: There is no abdominal tenderness.   Musculoskeletal:         General: Normal range of motion.      Cervical back: Normal range of motion and neck supple.   Skin:     General: Skin is warm and dry.      Findings: No rash.   Neurological:      Mental Status: She is alert and oriented to person, place, and time.        RESULTS OF ZIOPATCH still pending    CONCLUSIONS OF ECHOCARDIOGRAM OF 2/28/2022:  No prior study is available for comparison.   Normal left ventricular systolic function.   No evidence of valvular abnormality based on Doppler evaluation.   Moderately dilated left atrium.     10 year ASCVD risk: 4.91%     FINDINGS OF CTCS OF 9/1/20202:  Coronary calcification:  LMA - 4.2  LCX - 0.0  LAD - 25.5  RCA - 0.0  Total Calcium Score: 29.7     Lab Results   Component Value Date/Time    CHOLSTRLTOT 236 (H) 01/31/2022 09:15 AM     (H) 01/31/2022 09:15 AM    HDL 57 01/31/2022 09:15 AM    TRIGLYCERIDE 60 01/31/2022 09:15 AM         Assessment & Plan     1. Paroxysmal atrial fibrillation (HCC)  metoprolol SR (TOPROL XL) 25 MG TABLET SR 24 HR   2. Atrial fibrillation with RVR (HCC)  metoprolol SR (TOPROL XL) 25 MG TABLET SR 24 HR   3. Other specified hypothyroidism     4. Thrombophlebitis of superficial veins of right lower extremity     5. Varicose veins of both lower extremities, unspecified whether complicated         Medical Decision Making: Today's Assessment/Status/Plan:      1. Paroxysmal atrial fibrillation, in sinus rhythm today on Toprol XL. She did complete a ZioPatch last Thursday; report has not yet been received and/or processed. Once we receive information, I will call her with results and any treatment changes. For now, continue Toprol XL and ASA.    2. Hypothyroidism, treated, with improvement in TSH; PCP did made slight adjustment in her Synthroid dose.    3. Hyperlipidemia, but CTCS of 29.7. 10 year ASCVD risk is 4.91. For now, no treatment; might consider referral for genetic testing for FH, given high LDL.    4. Varicose veins, with superficial thrombophlebitis, she has been referred to vascular specialist.    For now, same medications. Can take additional Toprol XL 12.5-25mg PRN sustained palpitations. We will notify her of results of ZioPatch once we receive them. Follow-up with me in 3  months.

## 2022-03-07 NOTE — TELEPHONE ENCOUNTER
----- Message from BESSY Rodas sent at 3/6/2022  3:56 PM PST -----  Please let Jess know that her vit D is back to normal at 32.    Thyroid lab work is almost back within normal limits but T4 is still slightly too high.  I would like you to skip one day per week of your levothyroxine, only taking it 6 days per week and rechecking in just 6 weeks.   Thanks  Ruchi

## 2022-03-11 ENCOUNTER — TELEPHONE (OUTPATIENT)
Dept: CARDIOLOGY | Facility: MEDICAL CENTER | Age: 62
End: 2022-03-11
Payer: COMMERCIAL

## 2022-03-14 ENCOUNTER — TELEPHONE (OUTPATIENT)
Dept: CARDIOLOGY | Facility: MEDICAL CENTER | Age: 62
End: 2022-03-14
Payer: COMMERCIAL

## 2022-03-14 NOTE — TELEPHONE ENCOUNTER
----- Message from Diane Pickard R.N. sent at 3/11/2022  9:38 AM PST -----  Please advise on what to say to ptYakov FALLON with you 6/6/22. Thank you.

## 2022-03-14 NOTE — TELEPHONE ENCOUNTER
Result Notes     BESSY Alexis   3/11/2022 10:17 AM PST Back to Top        Please let Jess know that Elizabeth showed NO Afib, which is good news.  She did have 18 episodes of SVT that last 6-19 beats, and PACs 3% of the time.  These are not worrisome, but I would suggest she continue her current medications, including Toprol.   She can also add OTC magnesium oxide 400mg once daily to help additional, if she wants.  Keep FU as scheduled, and keep FU with Ruchi regarding her thyroid.  Thanks, AB     --------------------------  Called pt [phone]  Relayed results and recommendations per AB. All questions answered regarding SVT and PAC. Verified next OV with AB. Pt verbalized understanding and is appreciative of call.

## 2022-05-17 DIAGNOSIS — E03.8 OTHER SPECIFIED HYPOTHYROIDISM: ICD-10-CM

## 2022-05-18 RX ORDER — LEVOTHYROXINE SODIUM 112 UG/1
TABLET ORAL
Qty: 30 TABLET | Refills: 2 | Status: ON HOLD | OUTPATIENT
Start: 2022-05-18 | End: 2022-08-21 | Stop reason: SDUPTHER

## 2022-05-18 NOTE — TELEPHONE ENCOUNTER
Received request via: Pharmacy    Was the patient seen in the last year in this department? Yes  LOV 02/14/2022  Does the patient have an active prescription (recently filled or refills available) for medication(s) requested? No

## 2022-05-19 ENCOUNTER — HOSPITAL ENCOUNTER (OUTPATIENT)
Dept: LAB | Facility: MEDICAL CENTER | Age: 62
End: 2022-05-19
Attending: NURSE PRACTITIONER
Payer: COMMERCIAL

## 2022-05-19 DIAGNOSIS — E03.8 OTHER SPECIFIED HYPOTHYROIDISM: ICD-10-CM

## 2022-05-19 LAB
T4 FREE SERPL-MCNC: 1.66 NG/DL (ref 0.93–1.7)
TSH SERPL DL<=0.005 MIU/L-ACNC: 3.54 UIU/ML (ref 0.38–5.33)

## 2022-05-19 PROCEDURE — 36415 COLL VENOUS BLD VENIPUNCTURE: CPT

## 2022-05-19 PROCEDURE — 84439 ASSAY OF FREE THYROXINE: CPT

## 2022-05-19 PROCEDURE — 84443 ASSAY THYROID STIM HORMONE: CPT

## 2022-05-23 ENCOUNTER — TELEPHONE (OUTPATIENT)
Dept: MEDICAL GROUP | Facility: LAB | Age: 62
End: 2022-05-23
Payer: COMMERCIAL

## 2022-05-23 NOTE — TELEPHONE ENCOUNTER
----- Message from BESSY Rodas sent at 5/23/2022  7:13 AM PDT -----  Please let Jess know that her thyroid lab work is normal.

## 2022-06-06 ENCOUNTER — TELEPHONE (OUTPATIENT)
Dept: MEDICAL GROUP | Facility: LAB | Age: 62
End: 2022-06-06

## 2022-06-06 ENCOUNTER — OFFICE VISIT (OUTPATIENT)
Dept: CARDIOLOGY | Facility: MEDICAL CENTER | Age: 62
End: 2022-06-06
Payer: COMMERCIAL

## 2022-06-06 VITALS
RESPIRATION RATE: 16 BRPM | HEIGHT: 60 IN | OXYGEN SATURATION: 97 % | HEART RATE: 66 BPM | SYSTOLIC BLOOD PRESSURE: 122 MMHG | DIASTOLIC BLOOD PRESSURE: 78 MMHG | BODY MASS INDEX: 28.07 KG/M2 | WEIGHT: 143 LBS

## 2022-06-06 DIAGNOSIS — I48.91 ATRIAL FIBRILLATION WITH RVR (HCC): ICD-10-CM

## 2022-06-06 DIAGNOSIS — I83.93 VARICOSE VEINS OF BOTH LOWER EXTREMITIES, UNSPECIFIED WHETHER COMPLICATED: ICD-10-CM

## 2022-06-06 DIAGNOSIS — I48.0 PAROXYSMAL ATRIAL FIBRILLATION (HCC): ICD-10-CM

## 2022-06-06 DIAGNOSIS — E03.8 OTHER SPECIFIED HYPOTHYROIDISM: ICD-10-CM

## 2022-06-06 PROCEDURE — 99214 OFFICE O/P EST MOD 30 MIN: CPT | Performed by: NURSE PRACTITIONER

## 2022-06-06 RX ORDER — METOPROLOL SUCCINATE 25 MG/1
25 TABLET, EXTENDED RELEASE ORAL DAILY
Qty: 100 TABLET | Refills: 3 | Status: SHIPPED | OUTPATIENT
Start: 2022-06-06 | End: 2022-12-01 | Stop reason: SDUPTHER

## 2022-06-06 ASSESSMENT — ENCOUNTER SYMPTOMS
PND: 0
INSOMNIA: 0
LOSS OF CONSCIOUSNESS: 0
BRUISES/BLEEDS EASILY: 0
SHORTNESS OF BREATH: 0
FEVER: 0
HEADACHES: 0
CHILLS: 0
ORTHOPNEA: 0
ABDOMINAL PAIN: 0
NAUSEA: 0
PALPITATIONS: 0
DIZZINESS: 0
MYALGIAS: 0
COUGH: 0

## 2022-06-06 ASSESSMENT — FIBROSIS 4 INDEX: FIB4 SCORE: 1.06

## 2022-06-06 NOTE — TELEPHONE ENCOUNTER
Pt notified that she needs an appt for a letter.  She works tomorrow and is unable to come in.  Thanks for your time.

## 2022-06-06 NOTE — TELEPHONE ENCOUNTER
Unfortunately I cannot do this note without having seen her.  Please offer her an appointment with someone tomorrow if there is an opening or suggest urgent care.  Thanks

## 2022-06-06 NOTE — TELEPHONE ENCOUNTER
If she is going to be penalized at work without a letter, we could do a virtual where she can show me the negative test/we can talk about symptoms and document a visit / do a letter at [address] today.

## 2022-06-06 NOTE — PROGRESS NOTES
Chief Complaint   Patient presents with   • Follow-Up   • Atrial Fibrillation   • Hypothyroidism   • Varicose Veins       Subjective     Jess Root is a 61 y.o. female who presents today for three month follow-up of PAFib, varicose veins and hypothyroidism.    Jess is a 61 year old female with history of hypothyroidism and hyperlipidemia.    In March 2022, she has new onset AFib with RVR, treated with Toprol XL and ASA 325mg (as she is [id number] score 1).  Echocardiogram and ZioPatch were ordered, which showed normal LV size and function, and no AFib on ZioPatch, with only brief episodes of SVT (8-19 seconds).     She is here today for three month follow-up. Generally, she is doing well. She denies any chest pain, pressure, tightness or discomfort; no shortness of breath, orthopnea or PND; no dizziness or syncope; no LE edema. No episodes of palpitations; she has not had to take any additional Toprol.     She does have some varicose veins and has been referred to vascular specialist, but she has not yet made any appointment. She did have a sinus infection in the last couple of weeks.    Past Medical History:   Diagnosis Date   • Atrial fibrillation (HCC) 02/2022    Echocardiogram with normal LV size, LVEF 60%. Moderately dilated LA. Trace MR, mild AI.   • HSV-2 infection    • Hyperlipidemia    • Osteopenia    • Pseudocholinesterase deficiency    • Thyroid disease nodules - thyroidectomy     Past Surgical History:   Procedure Laterality Date   • VEIN STRIPPING  2005   • ABDOMINAL HYSTERECTOMY TOTAL  2005   • MENISCUS REPAIR      right knee 9/2017 - Dr. Johnson   • THYROIDECTOMY       Family History   Problem Relation Age of Onset   • Diabetes Mother    • Heart Disease Mother    • Stroke Mother    • Heart Disease Father      Social History     Socioeconomic History   • Marital status: Legally      Spouse name: Not on file   • Number of children: Not on file   • Years of education: Not on file   •  Highest education level: Not on file   Occupational History   • Not on file   Tobacco Use   • Smoking status: Never Smoker   • Smokeless tobacco: Never Used   Vaping Use   • Vaping Use: Never used   Substance and Sexual Activity   • Alcohol use: No   • Drug use: No   • Sexual activity: Not on file     Comment: , two kids   Other Topics Concern   • Not on file   Social History Narrative   • Not on file     Social Determinants of Health     Financial Resource Strain: Not on file   Food Insecurity: Not on file   Transportation Needs: Not on file   Physical Activity: Not on file   Stress: Not on file   Social Connections: Not on file   Intimate Partner Violence: Not on file   Housing Stability: Not on file     Allergies   Allergen Reactions   • Other Misc      Anesthesia-- pseudocholinesterace   • Succinylcholine      PSEUDOCHOLINE DEFICIENCY     Outpatient Encounter Medications as of 6/6/2022   Medication Sig Dispense Refill   • metoprolol SR (TOPROL XL) 25 MG TABLET SR 24 HR Take 1 Tablet by mouth every day. 100 Tablet 3   • levothyroxine (SYNTHROID) 112 MCG Tab TAKE ONE TABLET BY MOUTH EVERY MORNING ON AN EMPTY STOMACH 30 Tablet 2   • furosemide (LASIX) 20 MG Tab Take 20 mg by mouth as needed.     • aspirin (ASA) 325 MG Tab Take 1 Tablet by mouth every day. 30 Tablet 1   • potassium chloride ER (KLOR-CON) 10 MEQ tablet TAKE 1 TO 2 TABLETS BY MOUTH ONCE DAILY (Patient taking differently: Take 10-20 mEq by mouth as needed (Per pt, when she feels like she needs to take one).) 30 Tablet 4   • [DISCONTINUED] metoprolol SR (TOPROL XL) 25 MG TABLET SR 24 HR Take 1 Tablet by mouth every day. 90 Tablet 3     No facility-administered encounter medications on file as of 6/6/2022.     Review of Systems   Constitutional: Negative for chills and fever.   HENT: Negative for congestion.    Respiratory: Negative for cough and shortness of breath.    Cardiovascular: Negative for chest pain, palpitations, orthopnea, leg  swelling and PND.   Gastrointestinal: Negative for abdominal pain and nausea.   Musculoskeletal: Negative for myalgias.   Skin: Negative for rash.   Neurological: Negative for dizziness, loss of consciousness and headaches.   Endo/Heme/Allergies: Does not bruise/bleed easily.   Psychiatric/Behavioral: The patient does not have insomnia.               Objective     /78 (BP Location: Left arm, Patient Position: Sitting, BP Cuff Size: Adult)   Pulse 66   Resp 16   Ht 1.524 m (5')   Wt 64.9 kg (143 lb)   SpO2 97%   BMI 27.93 kg/m²     Physical Exam  Constitutional:       Appearance: She is well-developed.   HENT:      Head: Normocephalic.   Neck:      Vascular: No JVD.      Comments: Scar of previous thyroidectomy.  Cardiovascular:      Rate and Rhythm: Normal rate and regular rhythm.      Heart sounds: Normal heart sounds.   Pulmonary:      Effort: Pulmonary effort is normal. No respiratory distress.      Breath sounds: Normal breath sounds. No wheezing or rales.   Abdominal:      General: Bowel sounds are normal. There is no distension.      Palpations: Abdomen is soft.      Tenderness: There is no abdominal tenderness.   Musculoskeletal:         General: Normal range of motion.      Cervical back: Normal range of motion and neck supple.   Skin:     General: Skin is warm and dry.      Findings: No rash.   Neurological:      Mental Status: She is alert and oriented to person, place, and time.     CONCLUSIONS OF ECHOCARDIOGRAM OF 2/28/2022:  No prior study is available for comparison.   Normal left ventricular systolic function.   No evidence of valvular abnormality based on Doppler evaluation.   Moderately dilated left atrium.     RESULTS OF ZIOPATCH OF 3/11/2022:  Average HR 61bpm  Minimum HR 39bpm  Maximum 169bpm  18 SVT episodes (8-19 seconds)  PACs 3%  PVCs <1%    10 year ASCVD risk: 4.48%    Component      Latest Ref Rng & Units 1/31/2022 3/4/2022 5/19/2022           9:15 AM  8:29 AM  1:24 PM   TSH       0.380 - 5.330 uIU/mL 0.060 (L) 0.380 3.540     Lab Results   Component Value Date/Time    CHOLSTRLTOT 236 (H) 01/31/2022 09:15 AM     (H) 01/31/2022 09:15 AM    HDL 57 01/31/2022 09:15 AM    TRIGLYCERIDE 60 01/31/2022 09:15 AM       Lab Results   Component Value Date/Time    SODIUM 139 02/14/2022 03:53 PM    POTASSIUM 3.6 02/14/2022 03:53 PM    CHLORIDE 103 02/14/2022 03:53 PM    CO2 25 02/14/2022 03:53 PM    GLUCOSE 109 (H) 02/14/2022 03:53 PM    BUN 8 02/14/2022 03:53 PM    CREATININE 0.60 02/14/2022 03:53 PM     Lab Results   Component Value Date/Time    ALKPHOSPHAT 89 02/14/2022 03:53 PM    ASTSGOT 27 02/14/2022 03:53 PM    ALTSGPT 24 02/14/2022 03:53 PM    TBILIRUBIN 0.4 02/14/2022 03:53 PM          Assessment & Plan     1. Paroxysmal atrial fibrillation (HCC)  metoprolol SR (TOPROL XL) 25 MG TABLET SR 24 HR   2. Atrial fibrillation with RVR (HCC)  metoprolol SR (TOPROL XL) 25 MG TABLET SR 24 HR   3. Varicose veins of both lower extremities, unspecified whether complicated     4. Other specified hypothyroidism         Medical Decision Making: Today's Assessment/Status/Plan:      1. PAFib, in sinus rhythm on low dose Toprol XL 25mg. She is feeling much better.    2. Anticoagulation with ASA 325mg once daily (she is [id number] score 1).    3. Varicose veins, has been referred to vascular surgeon.    4. Hypothyroidism, treated. Recent TSH was within normal limits.    5. Hyperlipidemia, borderline, but 10 year ASCVD risk is 4.48%. Consider CTCS in the next year or two.    Same medications for now. Follow-up annually, sooner if clinical condition changes.

## 2022-06-06 NOTE — TELEPHONE ENCOUNTER
1. Caller Name: Jess                         Call Back Number: [phone] (home)        How would the patient prefer to be contacted with a response: Phone call OK to leave a detailed message    Pt missed work 6/2, 6/3, and 6/4 for a sinus inf.  She had terrible sinus pressure and sinus drainage.  She feels better now that the drainage is done.  She was negative for COVID with a home test.  She is asking for a work note stating that she is not contagious and excusing her for those days.  She will  the note.

## 2022-08-10 ENCOUNTER — HOSPITAL ENCOUNTER (INPATIENT)
Facility: REHABILITATION | Age: 62
LOS: 12 days | DRG: 057 | End: 2022-08-22
Attending: PHYSICAL MEDICINE & REHABILITATION | Admitting: PHYSICAL MEDICINE & REHABILITATION
Payer: COMMERCIAL

## 2022-08-10 DIAGNOSIS — E03.8 OTHER SPECIFIED HYPOTHYROIDISM: ICD-10-CM

## 2022-08-10 DIAGNOSIS — I48.91 ATRIAL FIBRILLATION WITH RVR (HCC): ICD-10-CM

## 2022-08-10 DIAGNOSIS — I63.9 ISCHEMIC STROKE (HCC): ICD-10-CM

## 2022-08-10 PROBLEM — B37.0 ORAL THRUSH: Status: ACTIVE | Noted: 2022-08-10

## 2022-08-10 PROBLEM — E78.49 OTHER HYPERLIPIDEMIA: Status: ACTIVE | Noted: 2022-08-10

## 2022-08-10 PROBLEM — R45.86 EMOTIONAL LABILITY: Status: ACTIVE | Noted: 2022-08-10

## 2022-08-10 PROBLEM — I48.0 PAROXYSMAL ATRIAL FIBRILLATION (HCC): Status: ACTIVE | Noted: 2022-08-10

## 2022-08-10 PROBLEM — Z78.9 IMPAIRED MOBILITY AND ADLS: Status: ACTIVE | Noted: 2022-08-10

## 2022-08-10 PROBLEM — Z74.09 IMPAIRED MOBILITY AND ADLS: Status: ACTIVE | Noted: 2022-08-10

## 2022-08-10 PROBLEM — I10 ESSENTIAL HYPERTENSION: Status: ACTIVE | Noted: 2022-08-10

## 2022-08-10 LAB
FLUAV RNA SPEC QL NAA+PROBE: NEGATIVE
FLUBV RNA SPEC QL NAA+PROBE: NEGATIVE
RSV RNA SPEC QL NAA+PROBE: NEGATIVE
SARS-COV-2 RNA RESP QL NAA+PROBE: NOTDETECTED
SPECIMEN SOURCE: NORMAL

## 2022-08-10 PROCEDURE — 0241U HCHG SARS-COV-2 COVID-19 NFCT DS RESP RNA 4 TRGT MIC: CPT

## 2022-08-10 PROCEDURE — 770010 HCHG ROOM/CARE - REHAB SEMI PRIVAT*

## 2022-08-10 PROCEDURE — 94760 N-INVAS EAR/PLS OXIMETRY 1: CPT

## 2022-08-10 PROCEDURE — 700102 HCHG RX REV CODE 250 W/ 637 OVERRIDE(OP): Performed by: PHYSICAL MEDICINE & REHABILITATION

## 2022-08-10 PROCEDURE — 99223 1ST HOSP IP/OBS HIGH 75: CPT | Performed by: PHYSICAL MEDICINE & REHABILITATION

## 2022-08-10 PROCEDURE — A9270 NON-COVERED ITEM OR SERVICE: HCPCS | Performed by: PHYSICAL MEDICINE & REHABILITATION

## 2022-08-10 RX ORDER — LISINOPRIL 5 MG/1
10 TABLET ORAL
Status: DISCONTINUED | OUTPATIENT
Start: 2022-08-11 | End: 2022-08-11

## 2022-08-10 RX ORDER — OMEPRAZOLE 20 MG/1
20 CAPSULE, DELAYED RELEASE ORAL
Status: DISCONTINUED | OUTPATIENT
Start: 2022-08-11 | End: 2022-08-22 | Stop reason: HOSPADM

## 2022-08-10 RX ORDER — MIDAZOLAM HYDROCHLORIDE 5 MG/ML
5 INJECTION INTRAMUSCULAR; INTRAVENOUS PRN
Status: DISCONTINUED | OUTPATIENT
Start: 2022-08-10 | End: 2022-08-22 | Stop reason: HOSPADM

## 2022-08-10 RX ORDER — LEVOTHYROXINE SODIUM 112 UG/1
112 TABLET ORAL
Status: DISCONTINUED | OUTPATIENT
Start: 2022-08-11 | End: 2022-08-12

## 2022-08-10 RX ORDER — ECHINACEA PURPUREA EXTRACT 125 MG
2 TABLET ORAL PRN
Status: DISCONTINUED | OUTPATIENT
Start: 2022-08-10 | End: 2022-08-22 | Stop reason: HOSPADM

## 2022-08-10 RX ORDER — TRAZODONE HYDROCHLORIDE 50 MG/1
50 TABLET ORAL
Status: DISCONTINUED | OUTPATIENT
Start: 2022-08-10 | End: 2022-08-22 | Stop reason: HOSPADM

## 2022-08-10 RX ORDER — LACTULOSE 20 G/30ML
30 SOLUTION ORAL
Status: DISCONTINUED | OUTPATIENT
Start: 2022-08-10 | End: 2022-08-22 | Stop reason: HOSPADM

## 2022-08-10 RX ORDER — BISACODYL 10 MG
10 SUPPOSITORY, RECTAL RECTAL
Status: DISCONTINUED | OUTPATIENT
Start: 2022-08-10 | End: 2022-08-18

## 2022-08-10 RX ORDER — ACETAMINOPHEN 325 MG/1
650 TABLET ORAL EVERY 4 HOURS PRN
Status: DISCONTINUED | OUTPATIENT
Start: 2022-08-10 | End: 2022-08-22 | Stop reason: HOSPADM

## 2022-08-10 RX ORDER — ALUMINA, MAGNESIA, AND SIMETHICONE 2400; 2400; 240 MG/30ML; MG/30ML; MG/30ML
20 SUSPENSION ORAL
Status: DISCONTINUED | OUTPATIENT
Start: 2022-08-10 | End: 2022-08-22 | Stop reason: HOSPADM

## 2022-08-10 RX ORDER — ONDANSETRON 2 MG/ML
4 INJECTION INTRAMUSCULAR; INTRAVENOUS 4 TIMES DAILY PRN
Status: DISCONTINUED | OUTPATIENT
Start: 2022-08-10 | End: 2022-08-22 | Stop reason: HOSPADM

## 2022-08-10 RX ORDER — POLYETHYLENE GLYCOL 3350 17 G/17G
1 POWDER, FOR SOLUTION ORAL
Status: DISCONTINUED | OUTPATIENT
Start: 2022-08-10 | End: 2022-08-18

## 2022-08-10 RX ORDER — ATORVASTATIN CALCIUM 40 MG/1
80 TABLET, FILM COATED ORAL EVERY EVENING
Status: DISCONTINUED | OUTPATIENT
Start: 2022-08-10 | End: 2022-08-18

## 2022-08-10 RX ORDER — POLYVINYL ALCOHOL 14 MG/ML
1 SOLUTION/ DROPS OPHTHALMIC PRN
Status: DISCONTINUED | OUTPATIENT
Start: 2022-08-10 | End: 2022-08-22 | Stop reason: HOSPADM

## 2022-08-10 RX ORDER — HYDRALAZINE HYDROCHLORIDE 10 MG/1
10 TABLET, FILM COATED ORAL EVERY 8 HOURS PRN
Status: DISCONTINUED | OUTPATIENT
Start: 2022-08-10 | End: 2022-08-22 | Stop reason: HOSPADM

## 2022-08-10 RX ORDER — ONDANSETRON 4 MG/1
4 TABLET, ORALLY DISINTEGRATING ORAL 4 TIMES DAILY PRN
Status: DISCONTINUED | OUTPATIENT
Start: 2022-08-10 | End: 2022-08-22 | Stop reason: HOSPADM

## 2022-08-10 RX ORDER — AMOXICILLIN 250 MG
2 CAPSULE ORAL 2 TIMES DAILY
Status: DISCONTINUED | OUTPATIENT
Start: 2022-08-10 | End: 2022-08-18

## 2022-08-10 RX ORDER — LANOLIN ALCOHOL/MO/W.PET/CERES
3 CREAM (GRAM) TOPICAL NIGHTLY PRN
Status: DISCONTINUED | OUTPATIENT
Start: 2022-08-10 | End: 2022-08-22 | Stop reason: HOSPADM

## 2022-08-10 RX ORDER — HYDROXYZINE HYDROCHLORIDE 25 MG/1
50 TABLET, FILM COATED ORAL EVERY 6 HOURS PRN
Status: DISCONTINUED | OUTPATIENT
Start: 2022-08-10 | End: 2022-08-11

## 2022-08-10 RX ADMIN — NYSTATIN 500000 UNITS: 100000 SUSPENSION ORAL at 22:07

## 2022-08-10 RX ADMIN — RIVAROXABAN 20 MG: 20 TABLET, FILM COATED ORAL at 17:32

## 2022-08-10 RX ADMIN — SENNOSIDES AND DOCUSATE SODIUM 2 TABLET: 50; 8.6 TABLET ORAL at 12:58

## 2022-08-10 RX ADMIN — ATORVASTATIN CALCIUM 80 MG: 40 TABLET, FILM COATED ORAL at 22:06

## 2022-08-10 RX ADMIN — NYSTATIN 500000 UNITS: 100000 SUSPENSION ORAL at 17:32

## 2022-08-10 ASSESSMENT — LIFESTYLE VARIABLES
CONSUMPTION TOTAL: NEGATIVE
TOTAL SCORE: 0
EVER_SMOKED: NEVER
AVERAGE NUMBER OF DAYS PER WEEK YOU HAVE A DRINK CONTAINING ALCOHOL: 0
ALCOHOL_USE: NO
HAVE YOU EVER FELT YOU SHOULD CUT DOWN ON YOUR DRINKING: NO
ON A TYPICAL DAY WHEN YOU DRINK ALCOHOL HOW MANY DRINKS DO YOU HAVE: 0
TOTAL SCORE: 0
EVER HAD A DRINK FIRST THING IN THE MORNING TO STEADY YOUR NERVES TO GET RID OF A HANGOVER: NO
HOW MANY TIMES IN THE PAST YEAR HAVE YOU HAD 5 OR MORE DRINKS IN A DAY: 0
TOTAL SCORE: 0
HAVE PEOPLE ANNOYED YOU BY CRITICIZING YOUR DRINKING: NO
EVER FELT BAD OR GUILTY ABOUT YOUR DRINKING: NO

## 2022-08-10 ASSESSMENT — FIBROSIS 4 INDEX: FIB4 SCORE: 1.08

## 2022-08-10 ASSESSMENT — PATIENT HEALTH QUESTIONNAIRE - PHQ9
2. FEELING DOWN, DEPRESSED, IRRITABLE, OR HOPELESS: NOT AT ALL
SUM OF ALL RESPONSES TO PHQ9 QUESTIONS 1 AND 2: 0
1. LITTLE INTEREST OR PLEASURE IN DOING THINGS: NOT AT ALL

## 2022-08-10 NOTE — FLOWSHEET NOTE
08/10/22 1626   Events/Summary/Plan   Events/Summary/Plan RT Assessment   Vital Signs   Pulse (!) 56   Respiration 16   Pulse Oximetry 92 %   $ Pulse Oximetry (Spot Check) Yes   Respiratory Assessment   Respiratory Pattern Within Normal Limits   Level of Consciousness Alert   Chest Exam   Work Of Breathing / Effort Within Normal Limits   Breath Sounds   RUL Breath Sounds Clear   RML Breath Sounds Clear   RLL Breath Sounds Clear   NIRAV Breath Sounds Clear   LLL Breath Sounds Clear   Secretions   Cough Non Productive   Oxygen   O2 Delivery Device None - Room Air   Smoking History   Have you ever smoked Never

## 2022-08-10 NOTE — CARE PLAN
The patient is Stable - Low risk of patient condition declining or worsening         Progress made toward(s) clinical / shift goals:    Problem: Fall Risk - Rehab  Goal: Patient will remain free from falls  Outcome: Progressing   Orly Smith Fall risk Assessment Score: 7    Low fall risk interventions   - Call light within reach   - Yellow  socks   - Belongings within reach   - Bed in the lowest position       Pt uses call light consistently and appropriately. Waits for assistance does not attempt self transfer this shift. Able to verbalize needs.

## 2022-08-10 NOTE — H&P
REHABILITATION HISTORY AND PHYSICAL/POST ADMISSION EVALUATION    8/10/2022  3:25 PM  Jess Root  [id number]  Admission  8/10/2022 12:04 PM  [facility] Code/Reason for admission: 0001.1 - Stroke: Left Body Involvement (Right Brain)   Etiologic diagnosis/problem: Ischemic stroke (HCC)  Chief Complaint: left hand and arm weakness    HPI:  The patient is a 62 y.o. female with a past medical history of paroxysmal atrial fibrillation, hypothyroidism secondary to thyroidectomy; now admitted for acute inpatient rehabilitation with severe functional debility after an acute stroke.      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    She had negative Head CT and CTA for LVO. She received tPA. MRI showed bilateral strokes, largest in right frontal  parietal lobe in the SOPHIA territory, smaller in the left frontal lobe, also in the SOPHIA territory. Due to multiple vascular territories and history of atrial fibrillation, patient was switched from aspirin to Xarelto. HgbA1c 5.3, , ECHO EF 65-70%. She will need outpatient follow up with both neurology and cardiology.    Patient current reports left hand/arm weakness that is improved. She is right hand dominant. She also reports a period of right foot/toe weakness when she was in the ICU, but this has resolved. She reports intermittent blurry vision, wears reading glasses. She has some mild neck discomfort she things from being in bed. She denies any numbness or tingling. She moved her bowels yesterday and denies any issues with urination.     Patient was evaluated by Rehab Medicine physician and Physical Therapy, Occupational Therapy, and Speech Therapy and determined to be appropriate for acute inpatient rehab and was transferred to [facility] on 8/10/2022 12:04 PM.    With this acute therapeutic intervention, this  patient hopes to improve her functional status, and return to independent living with the supportive care of family.    REVIEW OF SYSTEMS:     A complete review of systems was performed and was negative in detail with the exception of items mentioned elsewhere in this document.    PMH:  Past Medical History:   Diagnosis Date    Atrial fibrillation (HCC) 02/2022    Echocardiogram with normal LV size, LVEF 60%. Moderately dilated LA. Trace MR, mild AI.    HSV-2 infection     Hyperlipidemia     Osteopenia     Pseudocholinesterase deficiency     Thyroid disease nodules - thyroidectomy    Varicose veins of both lower extremities        PSH:  Past Surgical History:   Procedure Laterality Date    VEIN STRIPPING  2005    ABDOMINAL HYSTERECTOMY TOTAL  2005    MENISCUS REPAIR      right knee 9/2017 - Dr. Johnson    THYROIDECTOMY         Family History   Problem Relation Age of Onset    Diabetes Mother     Heart Disease Mother     Stroke Mother     Heart Disease Father         MEDICATIONS:  Current Facility-Administered Medications   Medication Dose    hydrOXYzine HCl (ATARAX) tablet 50 mg  50 mg    melatonin tablet 3 mg  3 mg    Respiratory Therapy Consult      Pharmacy Consult Request ...Pain Management Review 1 Each  1 Each    acetaminophen (Tylenol) tablet 650 mg  650 mg    senna-docusate (PERICOLACE or SENOKOT S) 8.6-50 MG per tablet 2 Tablet  2 Tablet    And    polyethylene glycol/lytes (MIRALAX) PACKET 1 Packet  1 Packet    And    magnesium hydroxide (MILK OF MAGNESIA) suspension 30 mL  30 mL    And    bisacodyl (DULCOLAX) suppository 10 mg  10 mg    lactulose 20 GM/30ML solution 30 mL  30 mL    docusate sodium (ENEMEEZ) enema 283 mg  283 mg    [START ON 8/11/2022] omeprazole (PRILOSEC) capsule 20 mg  20 mg    artificial tears ophthalmic solution 1 Drop  1 Drop    mag hydrox-al hydrox-simeth (MAALOX PLUS ES or MYLANTA DS) suspension 20 mL  20 mL    ondansetron (ZOFRAN ODT) dispertab 4 mg  4 mg    Or    ondansetron  (ZOFRAN) syringe/vial injection 4 mg  4 mg    traZODone (DESYREL) tablet 50 mg  50 mg    sodium chloride (OCEAN) 0.65 % nasal spray 2 Spray  2 Spray    hydrALAZINE (APRESOLINE) tablet 10 mg  10 mg    rivaroxaban (XARELTO) tablet 20 mg  20 mg    atorvastatin (LIPITOR) tablet 80 mg  80 mg    [START ON 8/11/2022] lisinopril (PRINIVIL) tablet 10 mg  10 mg    [START ON 8/11/2022] metoprolol tartrate (LOPRESSOR) tablet 25 mg  25 mg    [START ON 8/11/2022] levothyroxine (SYNTHROID) tablet 112 mcg  112 mcg    midazolam (VERSED) 5 mg/mL (1 mL vial)  5 mg       ALLERGIES:  Other misc and Succinylcholine    PSYCHOSOCIAL HISTORY:  Pre-mobidly, the patient lived in a single level home /trailer with 4 steps to enter, in [place] with self. Per TCC, potential to discharge to her son's home in [facility]), in a 2 story home with first floor set up available, 2 steps to enter.    Patient currently works at [employer] at the [employer] and has worked there for 15 years.     LEVEL OF FUNCTION PRIOR TO DISABILTY:  Independent     LEVEL OF FUNCTION PRIOR TO ADMISSION to [facility]:  PT:    Min to mod assist for mobility    OT:    Min to mod assist for ADLs    SLP:    Soft and bite sized, though no dysphagia    CURRENT LEVEL OF FUNCTION:   Same as level of function prior to admission to [facility]    PHYSICAL EXAM:     VITAL SIGNS:   height is 1.524 m (5') and weight is 63.5 kg (139 lb 15.9 oz). Her oral temperature is 36.6 °C (97.9 °F). Her blood pressure is 133/89 and her pulse is 58 (abnormal). Her respiration is 18 and oxygen saturation is 95%.     GENERAL: No apparent distress, looks younger than her age  HEENT: Normocephalic/atraumatic, EOMI, PERRL, and No nystagmus, white plaque on tongue  CARDIAC: Regular rate and rhythm, normal S1, S2, no murmurs, no peripheral edema   LUNGS: Clear to auscultation, normal respiratory effort, on room air   ABDOMINAL: bowel sounds present, soft, nontender,  and nondistended    EXTREMITIES: no edema or 2+ bilateral DP/PT pulses  MSK: No joint swelling  PSYCH: tearful/emotionally labile     NEURO:    Mental status:  A&Ox4 (person, place, date, situation) answers questions appropriately follows commands  Speech: fluent, no aphasia or dysarthria    CRANIAL NERVES:  2,3: visual acuity grossly intact, PERRL  3,4,6: EOMI bilaterally, no nystagmus or diplopia  5: intact in all branches  7: no facial asymmetry  8: hearing grossly intact  9,10: symmetric palate elevation  11: SCM/Trapezius strength 5/5 bilaterally  12: tongue protrudes midline    Motor:  Shoulder flexors:  Right -  5/5, Left -  3/5  Elbow flexors:  Right -  5/5, Left -  3/5  Elbow extensors:  Right -  5/5, Left -  3/5  1/5 finger extension on left, 1/5 finger flexion on left  Hip flexors:  Right -  5/5, Left -  4/5  Knee ext:  Right -  5/5, Left -  5/5  Dorsiflexors:  Right -  5/5, Left -  5/5  EHL:  Right -  5/5, Left - 5/5  Plantar flexors:  Right -  5/5, Left -  5/5     Sensory:   intact to light touch through out    DTRs:   3+ in bilateral biceps, triceps, brachioradialis  3+ in bilateral patellar tendons and 3+ in bilateral achilles tendons  No clonus at bilateral ankles  Positive babinski b/l  Negative Mckeon b/l         RADIOLOGY:    She had negative Head CT and CTA for LVO.    MRI showed bilateral strokes, largest in right frontal  parietal lobe in the SOPHIA territory, smaller in the left frontal lobe, also in the SOPHIA territory.                   LABS:    HgbA1c 5.3    Sodium 137  Potassium 3.6  BUN 9.0  Creatinine 0.7  Glucose 104  Hemoglobin 13.5  Hematocrit 44.8  White blood cell 6.7  Platelets 315    PRIMARY REHAB DIAGNOSIS:    This patient is a 62 y.o. female admitted for acute inpatient rehabilitation with Ischemic stroke (HCC).    IMPAIRMENTS:   Cognitive  ADLs/IADLs  Mobility  Swallow    SECONDARY DIAGNOSIS/MEDICAL CO-MORBIDITIES AFFECTING FUNCTION:    Paroxysmal atrial  fibrillation  Hypertension  Hypothyroidism  Oral thrush      RELEVANT CHANGES SINCE PREADMISSION EVALUATION:    Status unchanged    The patient's rehabilitation potential is Excellent  The patient's medical prognosis is excellent    PLAN:   Discussion and Recommendations, discussed with the patient and/or family:   1. The patient requires an acute inpatient rehabilitation program with a coordinated program of care at an intensity and frequency not available at a lower level of care. This recommendation is substantiated by the patient's medical physicians who recommend that the patient's intervention and assessment of medical issues needs to be done at an acute level of care for patient's safety and maximum outcome.     2. A coordinated program of care will be supplied by an interdisciplinary team of physical therapy, occupational therapy, rehab physician, rehab nursing, and, if needed, speech therapy and rehab psychology. Rehab team presents a patient-specific rehabilitation and education program concentrating on prevention of future problems related to accessibility, mobility, skin, bowel, bladder, sexuality, and psychosocial and medical/surgical problems.     3. Need for Rehabilitation Physician: The rehab physician will be evaluating the patient on a multi-weekly basis to help coordinate the program of care. The rehab physician communicates between medical physicians, therapists, and nurses to maximize the patient's potential outcome. Specific areas in which the rehab physician will be providing daily assessment include the following:   A. Assessing the patient's heart rate and blood pressure response (vitals monitoring) to activity and making adjustments in medications or conservative measures as needed.   B. The rehab physician will be assessing the frequency at which the program can be increased to allow the patient to reach optimal functional outcome.   C. The rehab physician will also provide assessments in  daily skin care, especially in light of patient's impairments in mobility.   D. The rehab physician will provide special expertise in understanding how to work with functional impairment and recommend appropriate interventions, compensatory techniques, and education that will facilitate the patient's outcome.     4. Rehab R.N.   The rehab RN will be working with patient to carry over in room mobility and activities of daily living when the patient is not in 3 hours of skilled therapy. Rehab nursing will be working in conjunction with rehab physician to address all the medical issues above and continue to assess laboratory work and discuss abnormalities with the treating physicians, assess vitals, and response to activity, and discuss and report abnormalities with the rehab physician. Rehab RN will also continue daily skin care, supervise bladder/bowel program, instruct in medication administration, and ensure patient safety.     5. Therapies to treat at intensity and frequency of (may change after completion of evaluation by all therapeutic disciplines):       PT:  Physical therapy to address mobility, transfer, gait training and evaluation for adaptive equipment needs 1hour/day at least 5 days/week for the duration of the ELOS (see below)       OT:  Occupational therapy to address ADLs, self-care, home management training, functional mobility/transfers and assistive device evaluation, and community re-integration 1hour/day at least 5 days/week for the duration of the ELOS (see below).        ST/Dysphagia:  Speech therapy to address speech, language, and cognitive deficits as well as swallowing difficulties with retraining/dysphagia management and community re-integration with comprehension, expression, cognitive training 1hour/day at least 5 days/week for the duration of the ELOS (see below).     6. Medical management / Rehabilitation Issues/Adverse Potential affecting function as part of rehabilitation  plan.    Paroxysmal atrial fibrillation  Metoprolol  Xarelto    Hypertension  Metoprolol  Lisinopril    Hypothyroidism  Levothyroxine    Oral thrush  Start nystatin    I performed a complete drug regimen review and did not identify any potential clinically significant medication issues.    The patient's CODE STATUS was confirmed as DNR/DNI on admission, with the patient and/or family at bedside.    REHABILITATION ISSUES/ADVERSE POTENTIAL:  1.  CVA (Cerebrovascular Accident): Continue Xarelto for secondary prophylaxis as well as lipid and blood pressure management. Patient demonstrates functional deficits in strength, balance, coordination, and ADL's. Patient is admitted to [facility] for comprehensive rehabilitation therapy as described below.   Rehabilitation nursing monitors bowel and bladder control, educates on medication administration, co-morbidities and monitors patient safety.    2.  DVT prophylaxis:  Patient is on Xarelto for anticoagulation upon transfer. Encourage OOB. Monitor daily for signs and symptoms of DVT including but not limited to swelling and pain to prevent the development of DVT that may interfere with therapies.    3.  Pain: No issues with pain currently / Controlled with as needed oral analgesics.    4.  Nutrition/Dysphagia: Dietician monitors nutrient intake, recommend supplements prn and provide nutrition education to pt/family to promote optimal nutrition for wound healing/recovery.     5.  Bladder/bowel:  Start bowel and bladder program, to prevent constipation, urinary retention (which may lead to UTI), and urinary incontinence (which will impact upon pt's functional independence).   - TV Q3h while awake with post void bladder scans, I&O cath for PVRs >400  - up to commode after meal     6.  Skin/dermal ulcer prophylaxis: Monitor for new skin conditions with q.2 h. turns as required to prevent the development of skin breakdown.     7.  GI prophylaxis: Omeprazole to  prevent gastritis or GI bleed that would interfere with therapies.    8. Respiratory therapy: RT performs O2 management prn, breathing retraining, pulmonary hygiene and bronchospasm management prn to optimize participation in therapies.    Pt was seen today for 72 min, and entire time spent in face-to-face contact was >50% in counseling and coordination of care as detailed in A/P above.        GOALS/EXPECTED LEVEL OF FUNCTION BASED ON CURRENT MEDICAL AND FUNCTIONAL STATUS (may change based on patient's medical status and rate of impairment recovery):  Transfers:   Modified Independent  Mobility/Gait:   Modified Independent  ADL's:   Minimal Assistance  Cognition: Least verbal cues  Swallowing: Least restrictive diet    DISPOSITION: Discharge to pre-morbid independent living setting with the supportive care of patient's family.      ELOS: 14-21 days    Cheryl Sebastian M.D.  Physical Medicine and Rehabilitation

## 2022-08-10 NOTE — PROGRESS NOTES
Patient admitted to facility at 11:55 via wheel chair, accompanied by [facility] staff.  Patient assisted to room and positioned in bed for comfort and safety; call light within reach.  Patient assisted with stowing belongings and oriented to room and facility.  Admission assessment performed and documented in computer.  Admission paperwork completed; signed copies placed in chart.  2 RN skin check done with admitting RN Yolande and Nurse Arabella. Face photo and skin photos documented in media. Appropriate LDAs opened.   Pt with Anthony score of 19.

## 2022-08-10 NOTE — FLOWSHEET NOTE
08/10/22 1633   Patient History   Pulmonary Diagnosis None   Procedures Relevant to Respiratory Status None   Home O2 No   Nocturnal CPAP No   Home Treatments/Frequency No   Protocol Pathways   Protocol Pathways None

## 2022-08-11 PROBLEM — D75.1 ERYTHROCYTOSIS: Status: ACTIVE | Noted: 2022-08-11

## 2022-08-11 LAB
25(OH)D3 SERPL-MCNC: 32 NG/ML (ref 30–100)
ALBUMIN SERPL BCP-MCNC: 3.8 G/DL (ref 3.2–4.9)
ALBUMIN/GLOB SERPL: 1.6 G/DL
ALP SERPL-CCNC: 83 U/L (ref 30–99)
ALT SERPL-CCNC: 29 U/L (ref 2–50)
ANION GAP SERPL CALC-SCNC: 11 MMOL/L (ref 7–16)
AST SERPL-CCNC: 18 U/L (ref 12–45)
BASOPHILS # BLD AUTO: 1 % (ref 0–1.8)
BASOPHILS # BLD: 0.07 K/UL (ref 0–0.12)
BILIRUB SERPL-MCNC: 0.6 MG/DL (ref 0.1–1.5)
BUN SERPL-MCNC: 19 MG/DL (ref 8–22)
CALCIUM SERPL-MCNC: 9.1 MG/DL (ref 8.5–10.5)
CHLORIDE SERPL-SCNC: 101 MMOL/L (ref 96–112)
CO2 SERPL-SCNC: 24 MMOL/L (ref 20–33)
CREAT SERPL-MCNC: 0.78 MG/DL (ref 0.5–1.4)
EOSINOPHIL # BLD AUTO: 0.16 K/UL (ref 0–0.51)
EOSINOPHIL NFR BLD: 2.4 % (ref 0–6.9)
ERYTHROCYTE [DISTWIDTH] IN BLOOD BY AUTOMATED COUNT: 41.4 FL (ref 35.9–50)
GFR SERPLBLD CREATININE-BSD FMLA CKD-EPI: 85 ML/MIN/1.73 M 2
GLOBULIN SER CALC-MCNC: 2.4 G/DL (ref 1.9–3.5)
GLUCOSE SERPL-MCNC: 98 MG/DL (ref 65–99)
HCT VFR BLD AUTO: 48.4 % (ref 37–47)
HGB BLD-MCNC: 16.9 G/DL (ref 12–16)
IMM GRANULOCYTES # BLD AUTO: 0.04 K/UL (ref 0–0.11)
IMM GRANULOCYTES NFR BLD AUTO: 0.6 % (ref 0–0.9)
LYMPHOCYTES # BLD AUTO: 2.48 K/UL (ref 1–4.8)
LYMPHOCYTES NFR BLD: 36.6 % (ref 22–41)
MAGNESIUM SERPL-MCNC: 2 MG/DL (ref 1.5–2.5)
MCH RBC QN AUTO: 32.1 PG (ref 27–33)
MCHC RBC AUTO-ENTMCNC: 34.9 G/DL (ref 33.6–35)
MCV RBC AUTO: 92 FL (ref 81.4–97.8)
MONOCYTES # BLD AUTO: 0.97 K/UL (ref 0–0.85)
MONOCYTES NFR BLD AUTO: 14.3 % (ref 0–13.4)
NEUTROPHILS # BLD AUTO: 3.05 K/UL (ref 2–7.15)
NEUTROPHILS NFR BLD: 45.1 % (ref 44–72)
NRBC # BLD AUTO: 0 K/UL
NRBC BLD-RTO: 0 /100 WBC
PLATELET # BLD AUTO: 378 K/UL (ref 164–446)
PMV BLD AUTO: 9.4 FL (ref 9–12.9)
POTASSIUM SERPL-SCNC: 4 MMOL/L (ref 3.6–5.5)
PROT SERPL-MCNC: 6.2 G/DL (ref 6–8.2)
RBC # BLD AUTO: 5.26 M/UL (ref 4.2–5.4)
SODIUM SERPL-SCNC: 136 MMOL/L (ref 135–145)
WBC # BLD AUTO: 6.8 K/UL (ref 4.8–10.8)

## 2022-08-11 PROCEDURE — 80053 COMPREHEN METABOLIC PANEL: CPT

## 2022-08-11 PROCEDURE — 84443 ASSAY THYROID STIM HORMONE: CPT

## 2022-08-11 PROCEDURE — A9270 NON-COVERED ITEM OR SERVICE: HCPCS | Performed by: PHYSICAL MEDICINE & REHABILITATION

## 2022-08-11 PROCEDURE — 85025 COMPLETE CBC W/AUTO DIFF WBC: CPT

## 2022-08-11 PROCEDURE — 97535 SELF CARE MNGMENT TRAINING: CPT

## 2022-08-11 PROCEDURE — 770010 HCHG ROOM/CARE - REHAB SEMI PRIVAT*

## 2022-08-11 PROCEDURE — 99223 1ST HOSP IP/OBS HIGH 75: CPT | Performed by: HOSPITALIST

## 2022-08-11 PROCEDURE — 99233 SBSQ HOSP IP/OBS HIGH 50: CPT | Performed by: PHYSICAL MEDICINE & REHABILITATION

## 2022-08-11 PROCEDURE — 82306 VITAMIN D 25 HYDROXY: CPT

## 2022-08-11 PROCEDURE — 700102 HCHG RX REV CODE 250 W/ 637 OVERRIDE(OP): Performed by: HOSPITALIST

## 2022-08-11 PROCEDURE — 90791 PSYCH DIAGNOSTIC EVALUATION: CPT | Performed by: PSYCHOLOGIST

## 2022-08-11 PROCEDURE — 36415 COLL VENOUS BLD VENIPUNCTURE: CPT

## 2022-08-11 PROCEDURE — 97166 OT EVAL MOD COMPLEX 45 MIN: CPT

## 2022-08-11 PROCEDURE — 97162 PT EVAL MOD COMPLEX 30 MIN: CPT

## 2022-08-11 PROCEDURE — 97116 GAIT TRAINING THERAPY: CPT

## 2022-08-11 PROCEDURE — 700102 HCHG RX REV CODE 250 W/ 637 OVERRIDE(OP): Performed by: PHYSICAL MEDICINE & REHABILITATION

## 2022-08-11 PROCEDURE — 93005 ELECTROCARDIOGRAM TRACING: CPT | Performed by: PHYSICAL MEDICINE & REHABILITATION

## 2022-08-11 PROCEDURE — 83735 ASSAY OF MAGNESIUM: CPT

## 2022-08-11 PROCEDURE — A9270 NON-COVERED ITEM OR SERVICE: HCPCS | Performed by: HOSPITALIST

## 2022-08-11 PROCEDURE — 84439 ASSAY OF FREE THYROXINE: CPT

## 2022-08-11 RX ORDER — HYDROXYZINE HYDROCHLORIDE 25 MG/1
50 TABLET, FILM COATED ORAL EVERY 6 HOURS PRN
Status: DISCONTINUED | OUTPATIENT
Start: 2022-08-11 | End: 2022-08-22 | Stop reason: HOSPADM

## 2022-08-11 RX ORDER — LISINOPRIL 5 MG/1
5 TABLET ORAL
Status: DISCONTINUED | OUTPATIENT
Start: 2022-08-12 | End: 2022-08-11

## 2022-08-11 RX ADMIN — NYSTATIN 500000 UNITS: 100000 SUSPENSION ORAL at 20:57

## 2022-08-11 RX ADMIN — RIVAROXABAN 20 MG: 20 TABLET, FILM COATED ORAL at 17:19

## 2022-08-11 RX ADMIN — NYSTATIN 500000 UNITS: 100000 SUSPENSION ORAL at 17:19

## 2022-08-11 RX ADMIN — LEVOTHYROXINE SODIUM 112 MCG: 112 TABLET ORAL at 06:16

## 2022-08-11 RX ADMIN — LISINOPRIL 10 MG: 5 TABLET ORAL at 05:27

## 2022-08-11 RX ADMIN — NYSTATIN 500000 UNITS: 100000 SUSPENSION ORAL at 08:15

## 2022-08-11 RX ADMIN — ATORVASTATIN CALCIUM 80 MG: 40 TABLET, FILM COATED ORAL at 20:52

## 2022-08-11 RX ADMIN — ACETAMINOPHEN 650 MG: 325 TABLET ORAL at 10:29

## 2022-08-11 RX ADMIN — METOPROLOL TARTRATE 12.5 MG: 25 TABLET, FILM COATED ORAL at 20:52

## 2022-08-11 RX ADMIN — OMEPRAZOLE 20 MG: 20 CAPSULE, DELAYED RELEASE ORAL at 08:15

## 2022-08-11 RX ADMIN — METOPROLOL TARTRATE 25 MG: 25 TABLET, FILM COATED ORAL at 14:35

## 2022-08-11 RX ADMIN — NYSTATIN 500000 UNITS: 100000 SUSPENSION ORAL at 13:00

## 2022-08-11 ASSESSMENT — BRIEF INTERVIEW FOR MENTAL STATUS (BIMS)
BIMS SUMMARY SCORE: 15
ASKED TO RECALL SOCK: NO, COULD NOT RECALL
ASKED TO RECALL BLUE: YES, NO CUE REQUIRED
WHAT YEAR IS IT: CORRECT
INITIAL REPETITION OF BED BLUE SOCK - FIRST ATTEMPT: 3
ASKED TO RECALL BED: YES, NO CUE REQUIRED
ASKED TO RECALL BLUE: YES, NO CUE REQUIRED
WHAT DAY OF THE WEEK IS IT: CORRECT
BIMS SUMMARY SCORE: 13
INITIAL REPETITION OF BED BLUE SOCK - FIRST ATTEMPT: 3
WHAT MONTH IS IT: ACCURATE WITHIN 5 DAYS
ASKED TO RECALL BED: YES, NO CUE REQUIRED
WHAT YEAR IS IT: CORRECT
WHAT DAY OF THE WEEK IS IT: CORRECT
WHAT MONTH IS IT: ACCURATE WITHIN 5 DAYS
ASKED TO RECALL SOCK: YES, NO CUE REQUIRED

## 2022-08-11 ASSESSMENT — PATIENT HEALTH QUESTIONNAIRE - PHQ9
2. FEELING DOWN, DEPRESSED, IRRITABLE, OR HOPELESS: NOT AT ALL
1. LITTLE INTEREST OR PLEASURE IN DOING THINGS: NOT AT ALL
2. FEELING DOWN, DEPRESSED, IRRITABLE, OR HOPELESS: NOT AT ALL
SUM OF ALL RESPONSES TO PHQ9 QUESTIONS 1 AND 2: 0
1. LITTLE INTEREST OR PLEASURE IN DOING THINGS: NOT AT ALL
SUM OF ALL RESPONSES TO PHQ9 QUESTIONS 1 AND 2: 0

## 2022-08-11 ASSESSMENT — ACTIVITIES OF DAILY LIVING (ADL): TOILETING: INDEPENDENT

## 2022-08-11 ASSESSMENT — GAIT ASSESSMENTS
DISTANCE (FEET): 25
GAIT LEVEL OF ASSIST: STANDBY ASSIST

## 2022-08-11 NOTE — THERAPY
Physical Therapy   Initial Evaluation     Patient Name: Jess Root  Age:  62 y.o., Sex:  female  Medical Record #: [id number]  Today's Date: 8/11/2022     Subjective    Patient is in bed speaking with Dr Hewitt, agreeable to participate.      Objective       08/11/22 1331   PT Charge Group   Charges Yes   PT Gait Training 1   PT Evaluation PT Evaluation Mod   PT Total Time Spent   PT Individual Total Time Spent (Mins) 60   Prior Living Situation   Prior Services Home-Independent   Housing / Facility 1 Story House;Mobile Home   Steps Into Home 4   Rail None   Bathroom Set up Bathtub / Shower Combination   Equipment Owned None   Lives with - Patient's Self Care Capacity Alone and Able to Care For Self   Comments works as a  at [employer], likes to garden/ landscape her yard, meets regularly with friends   Prior Level of Functional Mobility   Bed Mobility Independent   Transfer Status Independent   Ambulation Independent   Distance Ambulation (Feet)   (community distances)   Assistive Devices Used None   Stairs Independent   Prior Functioning: Everyday Activities   Self Care Independent   Indoor Mobility (Ambulation) Independent   Stairs Independent   Functional Cognition Independent   Prior Device Use None of the given options   Active ROM Lower Body    Active ROM Lower Body  WDL   Strength Lower Body   Lower Body Strength  WDL   Comments 4+/5 L hip flexors, L knee flexors   Sensation Lower Body   Lower Extremity Sensation   WDL   Balance Assessment   Sitting Balance (Static) Good   Sitting Balance (Dynamic) Good   Standing Balance (Static) Fair   Standing Balance (Dynamic) Fair   Weight Shift Sitting Good   Weight Shift Standing Fair   Bed Mobility    Supine to Sit Standby Assist   Sit to Supine Supervised   Sit to Stand Standby Assist   Scooting Supervised   Coordination Lower Body    Coordination Lower Body  WDL   Roll Left and Right   Assistance Needed Independent   CARE Score - Roll Left and  Right 6   Roll Left and Right Discharge Goal   Discharge Goal 6   Sit to Lying   Assistance Needed Independent   CARE Score - Sit to Lying 6   Sit to Lying Discharge Goal   Discharge Goal 6   Lying to Sitting on Side of Bed   Assistance Needed Independent   CARE Score - Lying to Sitting on Side of Bed 6   Lying to Sitting on Side of Bed Discharge Goal   Discharge Goal 6   Sit to Stand   Assistance Needed Independent   CARE Score - Sit to Stand 6   Sit to Stand Discharge Goal   Discharge Goal 6   Chair/Bed-to-Chair Transfer   Assistance Needed Supervision   CARE Score - Chair/Bed-to-Chair Transfer 4   Chair/Bed-to-Chair Transfer Discharge Goal   Discharge Goal 6   Car Transfer   Assistance Needed Supervision   CARE Score - Car Transfer 4   Car Transfer Discharge Goal   Discharge Goal 6   Walk 10 Feet   Assistance Needed Supervision   CARE Score - Walk 10 Feet 4   Walk 10 Feet Discharge Goal   Discharge Goal 6   Walk 50 Feet with Two Turns   Assistance Needed Supervision   CARE Score - Walk 50 Feet with Two Turns 4   Walk 50 Feet with Two Turns Discharge Goal   Discharge Goal 6   Walk 150 Feet   Assistance Needed Supervision   CARE Score - Walk 150 Feet 4   Walk 150 Feet Discharge Goal   Discharge Goal 6   Walking 10 Feet on Uneven Surfaces   Assistance Needed Supervision;Incidental touching   CARE Score - Walking 10 Feet on Uneven Surfaces 4   Walking 10 Feet on Uneven Surfaces Discharge Goal   Discharge Goal 6   1 Step (Curb)   Assistance Needed Incidental touching   CARE Score - 1 Step (Curb) 4   1 Step (Curb) Discharge Goal   Discharge Goal 6   4 Steps   Assistance Needed Incidental touching   CARE Score - 4 Steps 4   4 Steps Discharge Goal   Discharge Goal 6   12 Steps   Assistance Needed Incidental touching   CARE Score - 12 Steps 4   12 Steps Discharge Goal   Discharge Goal 6   Picking Up Object   Assistance Needed Incidental touching   CARE Score - Picking Up Object 4   Picking Up Object Discharge Goal    Discharge Goal 6   Wheel 50 Feet with Two Turns   Reason if not Attempted Activity not applicable   CARE Score - Wheel 50 Feet with Two Turns 9   Wheel 50 Feet with Two Turns Discharge Goal   Discharge Goal 9   Wheel 150 Feet   Reason if not Attempted Activity not applicable   CARE Score - Wheel 150 Feet 9   Wheel 150 Feet Discharge Goal   Discharge Goal 9   Gait Functional Level of Assist    Gait Level Of Assist Standby Assist   Assistive Device None   Distance (Feet) 25   # of Times Distance was Traveled 2   Transfer Functional Level of Assist   Bed, Chair, Wheelchair Transfer Standby Assist   Toilet Transfers Standby Assist   Problem List    Problems Impaired Ambulation;Functional Strength Deficit;Impaired Balance;Decreased Activity Tolerance   Precautions   Precautions Fall Risk   Comments monitor BP and heart rate   Current Discharge Plan   Current Discharge Plan Return to Prior Living Situation   Interdisciplinary Plan of Care Collaboration   IDT Collaboration with  Occupational Therapist;Physician   Patient Position at End of Therapy In Bed;Call Light within Reach;Tray Table within Reach;Phone within Reach   Collaboration Comments variable heart rate, RT in room getting EKG   Benefit   Therapy Benefit Patient Would Benefit from Inpatient Rehabilitation Physical Therapy to Maximize Functional [place] with ADLs, IADLs and Mobility.   Strengths & Barriers   Strengths Able to follow instructions;Effective communication skills;Independent prior level of function;Motivated for self care and independence;Pleasant and cooperative;Willingly participates in therapeutic activities;Good carryover of learning;Good insight into deficits/needs       Assessment  Patient is 62 y.o. female with a diagnosis of CVA, AFIB on xarelto. Additional factors influencing patient status / progress (ie: cognitive factors, co-morbidities, social support, etc): lives alone, works at [employer] as a .      Plan  Recommend Physical  Therapy  minutes per day 5-7 days per week for 10-14 days for the following treatments:  PT Group Therapy, PT E Stim Attended, PT Gait Training, PT Self Care/Home Eval, PT Therapeutic Exercises, PT Neuro Re-Ed/Balance, PT Aquatic Therapy, PT Therapeutic Activity, and PT Evaluation.    Passport items to be completed:  Get in/out of bed safely, in/out of a vehicle, safely use mobility device, walk or wheel around home/community, navigate up and down stairs, show how to get up/down from the ground, ensure home is accessible, demonstrate HEP, complete caregiver training    Goals:  Long term and short term goals have been discussed with patient and they are in agreement.    Physical Therapy Problems (Active)       Problem: Balance       Dates: Start: 08/11/22         Goal: STG-Within one week, patient will maintain dynamic standing on foam with reaching tasks       Dates: Start: 08/11/22               Problem: Mobility       Dates: Start: 08/11/22         Goal: STG-Within one week, patient will ambulate community distances with LRAD with supervision assist       Dates: Start: 08/11/22            Goal: STG-Within one week, patient will ambulate up/down flight of stairs with supervision assist       Dates: Start: 08/11/22               Problem: PT-Long Term Goals       Dates: Start: 08/11/22         Goal: LTG-By discharge, patient will tolerate standing x 15 minutes while performing standing dynamic tasks       Dates: Start: 08/11/22            Goal: LTG-By discharge, patient will ambulate up/down flight of stairs independently       Dates: Start: 08/11/22            Goal: LTG-By discharge, patient will transfer in/out of a car independently.       Dates: Start: 08/11/22

## 2022-08-11 NOTE — ASSESSMENT & PLAN NOTE
She has had wide fluctuations of her blood pressure (103 systolic in the morning and 157 systolic a few hours later).   8/20 restarted toprol XL 25mg daily instead of 12.5 bid with improvement of BP control.

## 2022-08-11 NOTE — PROGRESS NOTES
Rehab Progress Note     Date of Service: 8/11/2022  Chief Complaint: follow up stroke    Interval Events (Subjective)    Patient seen and examined today in her room.  She had an episode of hypotension and dizziness this morning.  Right now she is complaining of heart palpations.   She denies any chest pain or shortness or breath, but is anxious about having another stroke.  She was able to do some walking with PT.  She was seen by psychology today.  She slept OK last night, not great, but better than she did at the outside hospital.  Patient has no other new questions, concerns, or complaints today.     ROS: No changes to bowel, bladder, pain, mood, or sleep.       Objective:  VITAL SIGNS: BP (!) 94/64   Pulse (!) 102   Temp 36.6 °C (97.9 °F) (Oral)   Resp 18   Ht 1.524 m (5')   Wt 63.5 kg (139 lb 15.9 oz)   SpO2 95%   BMI 27.34 kg/m²   Gen: alert, no apparent distress  CV:tachycardia, irregular rhythm  Resp: Clear to auscultation bilaterally  Neuro: left hemiparesis      Recent Results (from the past 72 hour(s))   COV-2, FLU A/B, AND RSV BY PCR (2-4 HOURS [place]): Collect NP swab in [place]    Collection Time: 08/10/22  1:00 PM    Specimen: Respirate   Result Value Ref Range    Influenza virus A RNA Negative Negative    Influenza virus B, PCR Negative Negative    RSV, PCR Negative Negative    SARS-CoV-2 by PCR NotDetected     SARS-CoV-2 Source NP Swab    CBC with Differential    Collection Time: 08/11/22  5:16 AM   Result Value Ref Range    WBC 6.8 4.8 - 10.8 K/uL    RBC 5.26 4.20 - 5.40 M/uL    Hemoglobin 16.9 (H) 12.0 - 16.0 g/dL    Hematocrit 48.4 (H) 37.0 - 47.0 %    MCV 92.0 81.4 - 97.8 fL    MCH 32.1 27.0 - 33.0 pg    MCHC 34.9 33.6 - 35.0 g/dL    RDW 41.4 35.9 - 50.0 fL    Platelet Count 378 164 - 446 K/uL    MPV 9.4 9.0 - 12.9 fL    Neutrophils-Polys 45.10 44.00 - 72.00 %    Lymphocytes 36.60 22.00 - 41.00 %    Monocytes 14.30 (H) 0.00 - 13.40 %    Eosinophils 2.40 0.00 - 6.90 %    Basophils 1.00 0.00 -  1.80 %    Immature Granulocytes 0.60 0.00 - 0.90 %    Nucleated RBC 0.00 /100 WBC    Neutrophils (Absolute) 3.05 2.00 - 7.15 K/uL    Lymphs (Absolute) 2.48 1.00 - 4.80 K/uL    Monos (Absolute) 0.97 (H) 0.00 - 0.85 K/uL    Eos (Absolute) 0.16 0.00 - 0.51 K/uL    Baso (Absolute) 0.07 0.00 - 0.12 K/uL    Immature Granulocytes (abs) 0.04 0.00 - 0.11 K/uL    NRBC (Absolute) 0.00 K/uL   Comp Metabolic Panel (CMP)    Collection Time: 08/11/22  5:16 AM   Result Value Ref Range    Sodium 136 135 - 145 mmol/L    Potassium 4.0 3.6 - 5.5 mmol/L    Chloride 101 96 - 112 mmol/L    Co2 24 20 - 33 mmol/L    Anion Gap 11.0 7.0 - 16.0    Glucose 98 65 - 99 mg/dL    Bun 19 8 - 22 mg/dL    Creatinine 0.78 0.50 - 1.40 mg/dL    Calcium 9.1 8.5 - 10.5 mg/dL    AST(SGOT) 18 12 - 45 U/L    ALT(SGPT) 29 2 - 50 U/L    Alkaline Phosphatase 83 30 - 99 U/L    Total Bilirubin 0.6 0.1 - 1.5 mg/dL    Albumin 3.8 3.2 - 4.9 g/dL    Total Protein 6.2 6.0 - 8.2 g/dL    Globulin 2.4 1.9 - 3.5 g/dL    A-G Ratio 1.6 g/dL   Magnesium    Collection Time: 08/11/22  5:16 AM   Result Value Ref Range    Magnesium 2.0 1.5 - 2.5 mg/dL   Vitamin D, 25-hydroxy (blood)    Collection Time: 08/11/22  5:16 AM   Result Value Ref Range    25-Hydroxy   Vitamin D 25 32 30 - 100 ng/mL   ESTIMATED GFR    Collection Time: 08/11/22  5:16 AM   Result Value Ref Range    GFR (CKD-EPI) 85 >60 mL/min/1.73 m 2       Current Facility-Administered Medications   Medication Frequency    [START ON 8/12/2022] lisinopril (PRINIVIL) tablet 5 mg Q DAY    hydrOXYzine HCl (ATARAX) tablet 50 mg Q6HRS PRN    melatonin tablet 3 mg HS PRN    Respiratory Therapy Consult Continuous RT    Pharmacy Consult Request ...Pain Management Review 1 Each PHARMACY TO DOSE    acetaminophen (Tylenol) tablet 650 mg Q4HRS PRN    senna-docusate (PERICOLACE or SENOKOT S) 8.6-50 MG per tablet 2 Tablet BID    And    polyethylene glycol/lytes (MIRALAX) PACKET 1 Packet QDAY PRN    And    magnesium hydroxide (MILK OF  MAGNESIA) suspension 30 mL QDAY PRN    And    bisacodyl (DULCOLAX) suppository 10 mg QDAY PRN    lactulose 20 GM/30ML solution 30 mL QDAY PRN    docusate sodium (ENEMEEZ) enema 283 mg QDAY PRN    omeprazole (PRILOSEC) capsule 20 mg QAM AC    artificial tears ophthalmic solution 1 Drop PRN    mag hydrox-al hydrox-simeth (MAALOX PLUS ES or MYLANTA DS) suspension 20 mL Q2HRS PRN    ondansetron (ZOFRAN ODT) dispertab 4 mg 4X/DAY PRN    Or    ondansetron (ZOFRAN) syringe/vial injection 4 mg 4X/DAY PRN    traZODone (DESYREL) tablet 50 mg QHS PRN    sodium chloride (OCEAN) 0.65 % nasal spray 2 [place] PRN    hydrALAZINE (APRESOLINE) tablet 10 mg Q8HRS PRN    rivaroxaban (XARELTO) tablet 20 mg PM MEAL    atorvastatin (LIPITOR) tablet 80 mg Q EVENING    metoprolol tartrate (LOPRESSOR) tablet 25 mg TWICE DAILY    levothyroxine (SYNTHROID) tablet 112 mcg AM ES    midazolam (VERSED) 5 mg/mL (1 mL vial) PRN    nystatin (MYCOSTATIN) 877526 UNIT/ML suspension 500,000 Units 4X/DAY       Orders Placed This Encounter   Procedures    Diet Order Diet: Level 6 - Soft and Bite Sized; Liquid level: Level 0 - Thin     Standing Status:   Standing     Number of Occurrences:   1     Order Specific Question:   Diet:     Answer:   Level 6 - Soft and Bite Sized [23]     Order Specific Question:   Liquid level     Answer:   Level 0 - Thin       Assessment:    This patient is a 62 y.o. female admitted for acute inpatient rehabilitation with Ischemic stroke (HCC).    Problem List/Medical Decision Making and Plan:    Bilateral strokes  Right frontal parietal  Left frontal  Left hemiparesis  Non-dominant  Cardioembolic  Continue full rehab program  PT/OT/SLP, 1 hr each discipline, 5 days per week    Xarelto  Statin    Paroxysmal atrial fibrillation  Metoprolol  Xarelto  Check EKG, fast rate today    Hypertension  Hypotension today  Metoprolol  Lisinopril, decrease dose  Consult hospitalist    Hypothyroidism  History of thyroidectomy  Levothyroxine  Per  patient dose recently decreased  Check TSH    GI prophylaxis  Omeprazole     Oral thrush  Continue nystatin    Bowel program  Continue bowel medications  Last BM 8/10    Bladder program  Check PVRs - 40  Bladder scan for no voids  ICP for over 400 cc  Scheduled toileting     DVT prophylaxis  Xarelto    Total time:  35 minutes.  I spent greater than 50% of the time for patient care, counseling, and coordination on this date, including patient face-to face time, unit/floor time with review of records/pertinent lab data and studies, as well as discussing diagnostic evaluation/work up, planned therapeutic interventions, and future disposition of care, as per the interval events/subjective and the assessment and plan as noted above.      Cheryl Sebastian M.D.  Physical Medicine and Rehabilitation

## 2022-08-11 NOTE — CARE PLAN
The patient is Stable - Low risk of patient condition declining or worsening    Shift Goals  Clinical Goals: Safety    Progress made toward(s) clinical / shift goals:    Problem: Fall Risk - Rehab  Goal: Patient will remain free from falls  Outcome: Progressing   Orly Smith Fall risk Assessment Score: 7    Low fall risk interventions   - Call light within reach   - Yellow  socks   - Belongings within reach   - Bed in the lowest position       Pt uses call light consistently and appropriately. Waits for assistance does not attempt self transfer this shift. Able to verbalize needs.

## 2022-08-11 NOTE — THERAPY
"Speech Language Pathology   Initial Assessment     Patient Name: Jess Root  AGE:  62 y.o., SEX:  female  Medical Record #: [id number]  Today's Date: 8/11/2022     Subjective    Orders received for bedside swallow and cognitive assessment.   Pt with complaints of heart palpitations, vitals checked, BP 94/64,  at rest. Pt reporting feeling dizzy, faint like, weak and exhausted. Pt not wanting to eat breakfast (consumed three bites) and requesting back into bed. RN and MD aware as well as OT who was present for initial vitals.      Objective       08/11/22 0803   Therapy Missed   Missed Therapy (Minutes) 30   Reason For Missed Therapy Medical - Patient on Hold from Therapy;Medical - Patient not Able To Participate  (low BP, dizzy, weak and exhausted per pt report)   Evaluation Charges   Charges Yes   SLP Total Time Spent   SLP Individual Total Time Spent (Mins) 30   Prior Living Situation   Prior Services Home-Independent   Prior Level Of Function   Communication Within Functional Limits   Dentition Intact   Hearing Within Functional Limits for Evaluation   Vision Within Functional Limits for Evaluation   Occupation (Pre-Hospital Vocational) Employed Full Time   Cognitive Pattern Assessment   Cognitive Pattern Assessment Used BIMS   Brief Interview for Mental Status (BIMS)   Repetition of Three Words (First Attempt) 3   Temporal Orientation: Year Correct   Temporal Orientation: Month Accurate within 5 days   Temporal Orientation: Day Correct   Recall: \"Sock\" Yes, no cue required   Recall: \"Blue\" Yes, no cue required   Recall: \"Bed\" Yes, no cue required   BIMS Summary Score 15   Labial Function   Labial Structure At Rest Minimal   Jaw Function   Jaw Function (WDL) WDL   Swallowing   Thin Liquid Within Functional Limits   Masticated Foods Within Functional Limits   Dysphagia Strategies / Recommendations   Strategies / Interventions Recommended (Yes / No) Yes   Compensatory Strategies Single Sips / " Bites;Multiple Swallows   Diet / Liquid Recommendation Soft & Bite-Sized (6) - (Dysphagia III);Thin (0)   Medication Administration  Whole with Liquid Wash   Therapy Interventions Dysphagia Therapy By Speech Language Pathologist;Therapeutic Dining For Meals;MBSS Evaluation   Follow Up SLP Evaluation MBSS for further evaluation as appropriate.   Swallowing/Nutritional Status   Swallowing/Nutritional Status Modified food consistency   Eating   Assistance Needed Set-up / clean-up;Supervision;Verbal cues   Physical Assistance Level No physical assistance   CARE Score - Eating 4   Eating Discharge Goal   Discharge Goal 6   Functional Level of Assist   Eating Supervision   Eating Description Increased time;Checking for pocketing of food;Verbal cueing;Set-up of equipment or meal/tube feeding   Comprehension Supervision   Expression Modified Independent   Social Interaction Modified Independent   Problem Solving Supervision   Memory Minimal Assist   Problem List   Problem List Dysphagia   Current Discharge Plan   Current Discharge Plan Return to Prior Living Situation   Benefit   Therapy Benefit Patient would benefit from Inpatient Rehab Speech-Language Pathology to address above identified deficits.   Interdisciplinary Plan of Care Collaboration   IDT Collaboration with  Occupational Therapist;Certified Nursing Assistant;Physician   Patient Position at End of Therapy In Bed;Bed Alarm On;Call Light within Reach;Tray Table within Reach;Phone within Reach   Collaboration Comments low bp, pt reporting dizzy and weak, requesting in to bed. limited PO intake. Encouraged fluids.   Speech Language Pathologist Assigned   Assigned SLP / Extension LC 60 NO SPLIT tdine       Assessment    Patient is 62 y.o. female with a diagnosis of Bilateral CVA.  Additional factors influencing patient status/progress (ie: cognitive factors, co-morbidities, social support, etc): pt indep and able to care for self prior, reports working full time at  [employer].     BSE completed, oral [facility] exam within functional limits with the exception of slight left side facial weakness. Pt with mild suspected thrush across tongue blade, medication administered (nystatin). Pt tolerated medication whole with liquid wash. Pt assessed with current diet of soft and bite size textures with thin liquids, due to current medical status with low BP and exhaustion pt reporting poor appetite and desire to eat at this time. Pt consumed three bites of food (oatmeal and eggs) and single sips of liquids with no overt s/sx of asp/pen noted. SLP encouraged liquids due to current BP, pt receptive. Pt declined further PO intake and reported need to get into bed. Pt assisted back into bed by SLP. RN aware and MD informed following evaluation.     Pt to remain on current diet of soft and bite size with thin liquids, meds to be whole with thin liquid wash, initiation of regular texture trials with SLP with advancement of diet as appropriate. Pt aware of recommendations and in agreement with POC at this time.     Cognitive assessment unable to be completed due to current medical status and will be completed in future ST session.       Plan  Recommend Speech Therapy 30-60 minutes per day 5-6 days per week for 2 weeks for the following treatments:  SLP Swallowing Dysfunction Treatment, SLP Oral Pharyngeal Evaluation, SLP Video Swallow Evaluation, SLP Self Care / ADL Training , SLP Cognitive Skill Development, and SLP Group Treatment.      Goals:  Long term and short term goals have been discussed with patient and they are in agreement.    Speech Therapy Problems (Active)       Problem: Speech/Swallowing LTGs       Dates: Start: 08/11/22         Goal: LTG-By discharge, patient will safely swallow regular textures with no overt s/sx of asp/pen noted with 100% accuracy provided MOD I       Dates: Start: 08/11/22               Problem: Swallowing STGs       Dates: Start: 08/11/22         Goal: STG-Within  one week, patient will safely swallow trials of regular textures with no overt s/sx of asp/pen noted provided SPV       Dates: Start: 08/11/22

## 2022-08-11 NOTE — THERAPY
"Occupational Therapy   Initial Evaluation     Patient Name: Jess Root  Age:  62 y.o., Sex:  female  Medical Record #: [id number]  Today's Date: 8/11/2022     Subjective    Pt supine in bed upon arrival, pleasant and agreeable to OT session.       Objective       08/11/22 0701   OT Charge Group   OT Self Care / ADL 1   OT Evaluation OT Evaluation Mod   OT Total Time Spent   OT Individual Total Time Spent (Mins) 60   Prior Living Situation   Prior Services Home-Independent   Housing / Facility 1 Story House;Mobile Home   Steps Into Home 4   Rail None   Bathroom Set up Bathtub / Shower Combination   Equipment Owned None   Lives with - Patient's Self Care Capacity Alone and Able to Care For Self   Prior Level of ADL Function   Self Feeding Independent   Grooming / Hygiene Independent   Bathing Independent   Dressing Independent   Toileting Independent   Prior Level of IADL Function   Medication Management Independent   Laundry Independent   Kitchen Mobility Independent   Finances Independent   Home Management Independent   Shopping Independent   Prior Level Of Mobility Independent Without Device in Community;Independent With Steps in Community;Independent With Device in Home   Driving / Transportation Driving Independent   Occupation (Pre-Hospital Vocational) Employed Full Time  (works at [employer] as a )   Prior Functioning: Everyday Activities   Self Care Independent   Indoor Mobility (Ambulation) Independent   Stairs Independent   Functional Cognition Independent   Prior Device Use None of the given options   Cognitive Pattern Assessment   Cognitive Pattern Assessment Used BIMS   Brief Interview for Mental Status (BIMS)   Repetition of Three Words (First Attempt) 3   Temporal Orientation: Year Correct   Temporal Orientation: Month Accurate within 5 days   Temporal Orientation: Day Correct   Recall: \"Sock\" No, could not recall   Recall: \"Blue\" Yes, no cue required   Recall: \"Bed\" Yes, no cue required   BIMS " Summary Score 13   Active ROM Upper Body   Dominant Hand Right   Comments Pt L UE with decreased AROM though will attempt to incorporate into ADLs when able.   Balance Assessment   Sitting Balance (Static) Fair -   Sitting Balance (Dynamic) Fair -   Standing Balance (Static) Poor +   Bed Mobility    Supine to Sit Supervised   Sit to Stand Contact Guard Assist   Eating   Assistance Needed Supervision   CARE Score - Eating 4   Eating Discharge Goal   Discharge Goal 6   Oral Hygiene   Assistance Needed Physical assistance   Physical Assistance Level 25% or less   CARE Score - Oral Hygiene 3   Oral Hygiene Discharge Goal   Discharge Goal 6   Shower/Bathe Self   Assistance Needed Physical assistance   Physical Assistance Level 51%-75%   CARE Score - Shower/Bathe Self 2   Shower/Bathe Self Discharge Goal   Discharge Goal 6   Upper Body Dressing   Assistance Needed Supervision;Physical assistance   Physical Assistance Level 51%-75%   CARE Score - Upper Body Dressing 2   Upper Body Dressing Discharge Goal   Discharge Goal 6   Lower Body Dressing   Assistance Needed Physical assistance   Physical Assistance Level 51%-75%   CARE Score - Lower Body Dressing 2   Lower Body Dressing Discharge Goal   Discharge Goal 6   Putting On/Taking Off Footwear   Assistance Needed Physical assistance   Physical Assistance Level 51%-75%   CARE Score - Putting On/Taking Off Footwear 2   Putting On/Taking Off Footwear Discharge Goal   Discharge Goal 6   Toileting Hygiene   Assistance Needed Physical assistance   Physical Assistance Level 51%-75%   CARE Score - Toileting Hygiene 2   Toileting Hygiene Discharge Goal   Discharge Goal 6   Toilet Transfer   Assistance Needed Physical assistance   Physical Assistance Level 25% or less   CARE Score - Toilet Transfer 3   Toilet Transfer Discharge Goal   Discharge Goal 6   Hearing, Speech, and Vision   Ability to Hear Adequate   Ability to See in Adequate Light Adequate   Expression of Ideas and Wants  Some difficulty  (occasional pauses for processing)   Understanding Verbal and Non-Verbal Content Understands   Functional Level of Assist   Eating Supervision   Eating Description Increased time;Verbal cueing   Grooming Moderate Assist  (Pt required total A for hair as hair is matted will need to cont to A patient at next ADL session, and min A for oral hygiene)   Bathing Moderate Assist  (A with washing R side of body and A with hair hygiene, CGA when standing)   Upper Body Dressing Moderate Assist  (donning pt robe/gown, req A for threading LUE and for buttoning)   Lower Body Dressing Maximal Assist  (A with donning socks threading B LE and completion of L side over hip pursuit)   Toileting Moderate Assist  (Pt unable to reach to wipe bottom when asked, Req A for pulling up and donw pants on L side)   Bed, Chair, Wheelchair Transfer Minimal Assist  (Min-CGA with FWW)   Toilet Transfers Minimal Assist  (Min-CGA with FWW)   Tub / Shower Transfers Minimal Assist  (Min-CGA with FWW to a walk-in shower)   Problem List   Problem List Decreased Active Daily Living Skills;Decreased Homemaking Skills;Decreased Upper Extremity Strength Left;Decreased Upper Extremity AROM Left;Decreased Functional Mobility;Decreased Activity Tolerance;Impaired Coordination Left Upper Extremity;Impaired Postural Control / Balance   Precautions   Precautions Fall Risk   Comments monitor BP   Current Discharge Plan   Current Discharge Plan Return to Prior Living Situation   Interdisciplinary Plan of Care Collaboration   IDT Collaboration with  Nursing;Speech Therapist   Patient Position at End of Therapy Seated;Self Releasing Lap Belt Applied;Tray Table within Reach;Other (Comments)   Collaboration Comments RN for BP at end of session and handoff to SLP   Equipment Needs   Assistive Device / DME Front-Wheel Walker;Tub Transfer Bench;Grab Bars In Shower / Tub;Grab Bars By Toilet   Strengths & Barriers   Strengths Able to follow  "instructions;Alert and oriented;Good carryover of learning;Independent prior level of function;Manages pain appropriately;Motivated for self care and independence;Pleasant and cooperative;Willingly participates in therapeutic activities   Barriers Fatigue;Decreased endurance;Hemiparesis;Home accessibility;Impaired activity tolerance;Impaired balance;Impaired functional cognition       Assessment  Patient is 62 y.o. female with a diagnosis of Ischemic stroke with Left Body Involvement. Per Dr Sebastian's note pt has a \"past medical history of paroxysmal atrial fibrillation, hypothyroidism secondary to thyroidectomy\"    Additional factors influencing patient status / progress (ie: cognitive factors, co-morbidities, social support, etc): Pt presents this session with impaired use of LUE, though reports having intact sensation. Pt reports that she lives alone in a single story mobile home with 4 JES. She has a tub/shower combo with no GB or shower seat.  At time of evaluation patient presents below functional baseline w/ primary barriers being hemiparesis with LUE, activity tolerance, fatigue, decreased balance, decreased endurance and slower processing during functional cognition. He will benefit from daily skilled OT to maximize independence w/ ADLs, IADLs, and functional mobility.      Plan  Recommend Occupational Therapy  minutes per day 5-7 days per week for 10-14 days for the following treatments:  OT Group Therapy, OT Self Care/ADL, OT Cognitive Skill Dev, OT Community Reintegration, OT Neuro Re-Ed/Balance, OT Therapeutic Activity, OT Evaluation, and OT Therapeutic Exercise.    Passport items to be completed:  Perform bathroom transfers, complete dressing, complete feeding, get ready for the day, prepare a simple meal, participate in household tasks, adapt home for safety needs, demonstrate home exercise program, complete caregiver training     Goals:  Long term and short term goals have been discussed with " patient and they are in agreement.    Occupational Therapy Goals (Active)       Problem: Bathing       Dates: Start: 08/11/22         Goal: STG-Within one week, patient will bathe with Min A overall utilizing DME/AE as needed.       Dates: Start: 08/11/22               Problem: Dressing       Dates: Start: 08/11/22         Goal: STG-Within one week, patient will dress UB with Min A overall utilizing DME/AE as needed.       Dates: Start: 08/11/22            Goal: STG-Within one week, patient will dress LB with Min A overall utilizing DME/AE as needed.       Dates: Start: 08/11/22               Problem: Functional Transfers       Dates: Start: 08/11/22         Goal: STG-Within one week, patient will transfer to toilet with SBA overall utilizing DME/AE as needed.       Dates: Start: 08/11/22               Problem: Grooming       Dates: Start: 08/11/22         Goal: STG-Within one week, patient will complete grooming with set-up A overall utilizing DME/AE as needed.       Dates: Start: 08/11/22               Problem: OT Long Term Goals       Dates: Start: 08/11/22         Goal: LTG-By discharge, patient will complete basic self care tasks Mod I overall utilizing DME/AE as needed.       Dates: Start: 08/11/22            Goal: LTG-By discharge, patient will perform bathroom transfers with Mod I overall utilizing DME/AE as needed.       Dates: Start: 08/11/22

## 2022-08-11 NOTE — ASSESSMENT & PLAN NOTE
MRI: showed bilateral strokes, largest in right frontal  parietal lobe in the SOPHIA territory, smaller in the left frontal lobe, also in the SOPHIA territory  S/P tPA (at [facility], [place])  On Xarelto due to etiology likely A. fib  Physical rehabilitation ongoing. Ongoing left hand weakness and decreased dexterity.   Lipitor 80 mg (last LDL in Epic was 167 January 2022) though reasonable to put her on 40 mg daily given the possible side effects and fact that the stroke was from afib.  Her blood pressure has varied significantly.

## 2022-08-11 NOTE — CONSULTS
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  She had negative head CT and CTA for LVO.  She received tPA.  MRI showed bilateral strokes, largest in right frontal  parietal lobe in the SOPHIA territory, smaller in the left frontal lobe, also in the SOPHIA territory. Due to multiple vascular territories and history of atrial fibrillation, patient was switched from aspirin to Xarelto.  LDL was 161, echo showed EF 65-70%.  She will need outpatient follow up with both neurology and cardiology.      Because of the patient's weakness and debility, Rehab was consulted, evaluated the patient, and was deemed a good Rehab candidate.  The patient was transferred over to the Rehab facility on 08/10/2022    The patient denies fever, chills, nausea, vomiting, headaches, blurry vision, or chest pain.    REVIEW OF SYSTEMS: All review of systems are negative pre AMA and CMS criteria except for that stated in the HPI.    PAST MEDICAL HISTORY:  Past Medical History:   Diagnosis Date    Atrial fibrillation (HCC) 02/2022    Echocardiogram with normal LV size, LVEF 60%. Moderately dilated LA. Trace MR, mild AI.    HSV-2 infection     Hyperlipidemia     Osteopenia     Pseudocholinesterase deficiency     Thyroid disease nodules - thyroidectomy    Varicose veins of both lower extremities        PAST SURGICAL HISTORY:  Past Surgical History:   Procedure Laterality Date    VEIN STRIPPING  2005    ABDOMINAL HYSTERECTOMY TOTAL  2005    MENISCUS REPAIR      right knee 9/2017 - Dr. Johnson    THYROIDECTOMY         Allergies   Allergen Reactions    Other Misc       Anesthesia-- pseudocholinesterace    Succinylcholine      PSEUDOCHOLINE DEFICIENCY       CURRENT MEDICATIONS:    Current Facility-Administered Medications:     hydrOXYzine HCl    melatonin    Respiratory Therapy Consult    Pharmacy Consult Request    acetaminophen    senna-docusate **AND** polyethylene glycol/lytes **AND** magnesium hydroxide **AND** bisacodyl    lactulose    docusate sodium    omeprazole    artificial tears    mag hydrox-al hydrox-simeth    ondansetron **OR** ondansetron    traZODone    sodium chloride    hydrALAZINE    rivaroxaban    atorvastatin    metoprolol tartrate    levothyroxine    midazolam    nystatin    Social History     Socioeconomic History    Marital status: Legally    Tobacco Use    Smoking status: Never    Smokeless tobacco: Never   Vaping Use    Vaping Use: Never used   Substance and Sexual Activity    Alcohol use: No    Drug use: No       FAMILY HISTORY:  was reviewed and is not pertinent to this consultation.    PHYSICAL EXAMINATION:  VITAL SIGNS:  Temp is 97.8, blood pressure is 118/87, heart rate is , respiratory rate is 18.  GENERAL:  Patient was lying in bed in no distress.  HEENT:  Pupils were equal, round and reactive to light and accomodation.  Oral mucosa was pink and moist.  NECK:  Soft.  Supple.  No JVD.  HEART:  Irregular rhythm.  Normal S1 and S2.  No murmurs were appreciated.  LUNGS:  Are clear to auscultation bilaterally.  ABDOMEN:  Soft, non tender, non distended.  Bowels sound were positive in all four quadrants.  EXTREMITIES:  No clubbing, cyanosis.  There was no lower extremity edema.  NEUROLOGIC:  Cranial nerves two through twelve were grossly intact.    LABS:  Lab Results   Component Value Date/Time    SODIUM 136 08/11/2022 05:16 AM    POTASSIUM 4.0 08/11/2022 05:16 AM    CHLORIDE 101 08/11/2022 05:16 AM    CO2 24 08/11/2022 05:16 AM    GLUCOSE 98 08/11/2022 05:16 AM    BUN 19 08/11/2022 05:16 AM    CREATININE 0.78 08/11/2022 05:16 AM      Lab  Results   Component Value Date/Time    WBC 6.8 08/11/2022 05:16 AM    RBC 5.26 08/11/2022 05:16 AM    HEMOGLOBIN 16.9 (H) 08/11/2022 05:16 AM    HEMATOCRIT 48.4 (H) 08/11/2022 05:16 AM    MCV 92.0 08/11/2022 05:16 AM    MCH 32.1 08/11/2022 05:16 AM    MCHC 34.9 08/11/2022 05:16 AM    MPV 9.4 08/11/2022 05:16 AM    NEUTSPOLYS 45.10 08/11/2022 05:16 AM    LYMPHOCYTES 36.60 08/11/2022 05:16 AM    MONOCYTES 14.30 (H) 08/11/2022 05:16 AM    EOSINOPHILS 2.40 08/11/2022 05:16 AM    BASOPHILS 1.00 08/11/2022 05:16 AM      No results found for: PROTHROMBTM, INR       Erythrocytosis  H&H mildly elevated  Suspect 2nd to dehydration  Encouraging fluid intake  Consider IVF's if not improving  Note: if persists, would recommend Hematology as an out patient  Monitor    Essential hypertension  BP ok but dipped a little lower today at 94/64 (possibly from some dehydration)  Off Lisinopril (last dose this am 8/11)  On Lopressor  Note: did not get Lopressor this am 2nd to lower HR -- parameters have been re-adjusted  Note: home meds include Lopressor 25 mg bid  Cont to monitor    Hypothyroidism  TFT's ok (5/2022)  F/U repeat TFT's (pt was tachy)  On Synthroid    Ischemic stroke (HCC)  MRI: showed bilateral strokes, largest in right frontal  parietal lobe in the SOPHIA territory, smaller in the left frontal lobe, also in the SOPHIA territory  S/P tPA (at [facility])  On Xarelto  On Lipitor    Paroxysmal atrial fibrillation (HCC)  HR   HR was noted to be fast during PT -- got EKG  EKG (8/11): SVT at 157  F/U TFT's  On Lopressor: 25 mg bid  S/P Lopressor 25 mg x 1 this afternoon (did not get Lopressor 25 mg this am due to slightly lower HR)  Note: Will give 12.5 mg Lopressor tonight (8/11) since pt just got 25 mg at 2:30 pm  Note: Lopressor hold parameters adjusted  Cont to monitor      This case has been discussed with the attending Physiatrist.    Thank you for the consultation.  Will follow the patient with you.

## 2022-08-11 NOTE — PROGRESS NOTES
PSYCHOLOGICAL CONSULTATION:  Reason for admission: Ischemic stroke (HCC) [I63.9]  Reason for consult: Stroke recovery, Adj  Requesting Physician: Jaz    Legal status: Legal Status: Voluntary    Chief Complaint: Stroke recovery, reduced functionality    HPI: Jess Root is a 62 y.o. female with a past medical history of paroxysmal atrial fibrillation, hypothyroidism secondary to thyroidectomy; now admitted for acute inpatient rehabilitation with severe functional debility after an acute stroke.       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     She had negative Head CT and CTA for LVO. She received tPA. MRI showed bilateral strokes, largest in right frontal  parietal lobe in the SOPHIA territory, smaller in the left frontal lobe, also in the SOPHIA territory. Due to multiple vascular territories and history of atrial fibrillation, patient was switched from aspirin to Xarelto. HgbA1c 5.3, , ECHO EF 65-70%. She will need outpatient follow up with both neurology and cardiology.    Psychology was consulted due to emotional lability following stroke and adjustment to stroke and recovery     Risk Assessment: current symptoms as reported by pt.  Suicidal Ideation: Denies    Homicidal Ideation: Denies      Psychiatric Review of Systems:current symptoms as reported by pt.  Depression: Endorses Some tearfulness and sadness in the context of acute stroke recovery  Nadia: Denies   Anxiety/Panic Attacks: Endorses Some mild anxiety in the context of uncertain recovery trajectory and managing financial aspects of hospitalization  PTSD symptom: Denies   Psychosis:    Other:        Medical Review of Systems: as reported by pt. All systems reviewed. Only those found to be + are noted below. All others are negative.   Neurological:    TBIs:  Denies   SZs:Denies   Strokes:Endorses   Other:   Other medical symptoms:   Thyroid:Denies   Diabetes: Denies   Cardiovascular disease: Endorses    Past Psychiatric Hx: None reported    Family Psychiatric Hx: None reported    Social Hx:   Social History     Socioeconomic History    Marital status: Legally    Tobacco Use    Smoking status: Never    Smokeless tobacco: Never   Vaping Use    Vaping Use: Never used   Substance and Sexual Activity    Alcohol use: No    Drug use: No        Medical Hx: Chart reviewed. Only those findings of potential interest to psychiatry are noted below:  Past Medical History:   Diagnosis Date    Atrial fibrillation (HCC) 02/2022    Echocardiogram with normal LV size, LVEF 60%. Moderately dilated LA. Trace MR, mild AI.    HSV-2 infection     Hyperlipidemia     Osteopenia     Pseudocholinesterase deficiency     Thyroid disease nodules - thyroidectomy    Varicose veins of both lower extremities      Medical Conditions:     Allergies: Other misc and Succinylcholine  Medications (currently prescribed at [facility]):    Current Facility-Administered Medications:     hydrOXYzine HCl (ATARAX) tablet 50 mg, 50 mg, Oral, Q6HRS PRN, Cheryl Sebastian M.D.    melatonin tablet 3 mg, 3 mg, Oral, HS PRN, Cheryl Sebastian M.D.    Respiratory Therapy Consult, , Nebulization, Continuous RT, Cheryl Sebastian M.D.    Pharmacy Consult Request ...Pain Management Review 1 Each, 1 Each, Other, PHARMACY TO DOSE, Cheryl Sebastian M.D.    acetaminophen (Tylenol) tablet 650 mg, 650 mg, Oral, Q4HRS PRN, Cheryl Sebastian M.D., 650 mg at 08/11/22 1029    senna-docusate (PERICOLACE or SENOKOT S) 8.6-50 MG per tablet 2 Tablet, 2 Tablet, Oral, BID, 2 Tablet at 08/10/22 1258 **AND** polyethylene glycol/lytes (MIRALAX) PACKET 1 Packet, 1 Packet, Oral, QDAY PRN **AND** magnesium hydroxide (MILK OF MAGNESIA) suspension 30 mL, 30 mL, Oral, QDAY PRN **AND** bisacodyl (DULCOLAX) suppository 10 mg, 10 mg, Rectal,  QDAY PRN, Cheryl Sebastian M.D.    lactulose 20 GM/30ML solution 30 mL, 30 mL, Oral, QDAY PRN, Cheryl Sebastian M.D.    docusate sodium (ENEMEEZ) enema 283 mg, 283 mg, Rectal, QDAY PRN, Cheryl Sebastian M.D.    omeprazole (PRILOSEC) capsule 20 mg, 20 mg, Oral, QAM AC, Cheryl Sebastian M.D., 20 mg at 08/11/22 0815    artificial tears ophthalmic solution 1 Drop, 1 Drop, Both Eyes, PRN, Cheryl Sebastian M.D.    mag hydrox-al hydrox-simeth (MAALOX PLUS ES or MYLANTA DS) suspension 20 mL, 20 mL, Oral, Q2HRS PRN, Cheryl Sebastian M.D.    ondansetron (ZOFRAN ODT) dispertab 4 mg, 4 mg, Oral, 4X/DAY PRN **OR** ondansetron (ZOFRAN) syringe/vial injection 4 mg, 4 mg, Intramuscular, 4X/DAY PRN, Cheryl Sebastian M.D.    traZODone (DESYREL) tablet 50 mg, 50 mg, Oral, QHS PRN, Cheryl Sebastian M.D.    sodium chloride (OCEAN) 0.65 % nasal spray 2 Spray, 2 Spray, Nasal, PRN, Cheryl Sebastian M.D.    hydrALAZINE (APRESOLINE) tablet 10 mg, 10 mg, Oral, Q8HRS PRN, Cheryl Sebastian M.D.    rivaroxaban (XARELTO) tablet 20 mg, 20 mg, Oral, PM MEAL, Cheryl Sebastian M.D., 20 mg at 08/10/22 1732    atorvastatin (LIPITOR) tablet 80 mg, 80 mg, Oral, Q EVENING, Cheryl Sebastian M.D., 80 mg at 08/10/22 2206    metoprolol tartrate (LOPRESSOR) tablet 25 mg, 25 mg, Oral, TWICE DAILY, Cheryl Sebastian M.D.    levothyroxine (SYNTHROID) tablet 112 mcg, 112 mcg, Oral, AM ES, Cheryl Sebastian M.D., 112 mcg at 08/11/22 0616    midazolam (VERSED) 5 mg/mL (1 mL vial), 5 mg, Nasal, PRN, Cheryl Sebastian M.D.    nystatin (MYCOSTATIN) 750004 UNIT/ML suspension 500,000 Units, 5 mL, Swish & Spit, 4X/DAY, Cheryl Sebastian M.D., 500,000 Units at 08/11/22 1300  Labs:  Recent Results (from the past 24 hour(s))   CBC with Differential    Collection Time: 08/11/22  5:16 AM   Result Value Ref Range    WBC 6.8 4.8 - 10.8 K/uL    RBC 5.26 4.20 - 5.40 M/uL    Hemoglobin 16.9 (H) 12.0 - 16.0 g/dL    Hematocrit 48.4 (H) 37.0 - 47.0 %    MCV  92.0 81.4 - 97.8 fL    MCH 32.1 27.0 - 33.0 pg    MCHC 34.9 33.6 - 35.0 g/dL    RDW 41.4 35.9 - 50.0 fL    Platelet Count 378 164 - 446 K/uL    MPV 9.4 9.0 - 12.9 fL    Neutrophils-Polys 45.10 44.00 - 72.00 %    Lymphocytes 36.60 22.00 - 41.00 %    Monocytes 14.30 (H) 0.00 - 13.40 %    Eosinophils 2.40 0.00 - 6.90 %    Basophils 1.00 0.00 - 1.80 %    Immature Granulocytes 0.60 0.00 - 0.90 %    Nucleated RBC 0.00 /100 WBC    Neutrophils (Absolute) 3.05 2.00 - 7.15 K/uL    Lymphs (Absolute) 2.48 1.00 - 4.80 K/uL    Monos (Absolute) 0.97 (H) 0.00 - 0.85 K/uL    Eos (Absolute) 0.16 0.00 - 0.51 K/uL    Baso (Absolute) 0.07 0.00 - 0.12 K/uL    Immature Granulocytes (abs) 0.04 0.00 - 0.11 K/uL    NRBC (Absolute) 0.00 K/uL   Comp Metabolic Panel (CMP)    Collection Time: 08/11/22  5:16 AM   Result Value Ref Range    Sodium 136 135 - 145 mmol/L    Potassium 4.0 3.6 - 5.5 mmol/L    Chloride 101 96 - 112 mmol/L    Co2 24 20 - 33 mmol/L    Anion Gap 11.0 7.0 - 16.0    Glucose 98 65 - 99 mg/dL    Bun 19 8 - 22 mg/dL    Creatinine 0.78 0.50 - 1.40 mg/dL    Calcium 9.1 8.5 - 10.5 mg/dL    AST(SGOT) 18 12 - 45 U/L    ALT(SGPT) 29 2 - 50 U/L    Alkaline Phosphatase 83 30 - 99 U/L    Total Bilirubin 0.6 0.1 - 1.5 mg/dL    Albumin 3.8 3.2 - 4.9 g/dL    Total Protein 6.2 6.0 - 8.2 g/dL    Globulin 2.4 1.9 - 3.5 g/dL    A-G Ratio 1.6 g/dL   Magnesium    Collection Time: 08/11/22  5:16 AM   Result Value Ref Range    Magnesium 2.0 1.5 - 2.5 mg/dL   Vitamin D, 25-hydroxy (blood)    Collection Time: 08/11/22  5:16 AM   Result Value Ref Range    25-Hydroxy   Vitamin D 25 32 30 - 100 ng/mL   ESTIMATED GFR    Collection Time: 08/11/22  5:16 AM   Result Value Ref Range    GFR (CKD-EPI) 85 >60 mL/min/1.73 m 2          Cranial Imaging Hx: No recent cranial imaging in current chart, details regarding recent MRI at [facility] in [place]    Other:    Psychiatric Examination:  Vitals: Blood pressure 118/87, pulse 83, temperature 36.6 °C (97.9 °F),  "temperature source Oral, resp. rate 18, height 1.524 m (5'), weight 63.5 kg (139 lb 15.9 oz), SpO2 95 %, not currently breastfeeding.  Musculoskeletal: Sitting in bed normal psychomotor activity, no tics or unusual mannerisms noted  Appearance and Eye Contact: Easily established rapport WDWN, appropriate dress and grooming. Behavior is calm, cooperative,  appropriate eye contact  Attention/Alertness: Alert  Thought Process: Linear, Logical, and Goal Directed    Thought Content: No psychotic processes noted  Speech: Clear with normal rate and rhythm  Mood: \"overwhelmed but grateful\"            Affect: Euthymic with some tearfulness         SI/HI: Denies    Memory: Recent and remote memory appear intact     Orientation: alert, oriented to person, place and time  Insight into symptoms: within normal limits  Judgement into symptoms:within normal limits    Neuropsychological Testing:   Not formally tested.          ASSESSMENT: Jess Root is a 62 y.o. female with a past medical history of paroxysmal atrial fibrillation, hypothyroidism secondary to thyroidectomy; now admitted for acute inpatient rehabilitation with severe functional debility after an acute stroke.       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     She had negative Head CT and CTA for LVO. She received tPA. MRI showed bilateral strokes, largest in right frontal  parietal lobe in the SOPHIA territory, smaller in the left frontal lobe, also in the SOPHIA territory. Due to multiple vascular territories and history of atrial fibrillation, patient was switched from aspirin to Xarelto. HgbA1c 5.3, , ECHO EF 65-70%. She will need outpatient follow up with both neurology and cardiology.    Psychology was consulted due to emotional lability following stroke and adjustment to stroke and recovery. Session focused " on assessment of current functioning and psychoeducation about the cognitive and emotional components of stroke recovery. Will continue to follow.    DSM5 Diagnostic Considerations: Adj d/o with mixed anxiety and depressed mood      PLAN:  Records reviewed: yes  Discussed patient with other provider: Jaz  Will continue to follow  Thank you for the consult.    Ciara Hewitt, Ph.D.

## 2022-08-11 NOTE — PROGRESS NOTES
Received shift report and assumed care of patient.  Patient asleep, calm and stable, currently positioned in bed for comfort and safety; call light within reach.  Will continue to monitor.

## 2022-08-11 NOTE — CARE PLAN
Problem: Mobility  Goal: Patient's capacity to carry out activities will improve  Outcome: Progressing  Note: Pt left hand weak , able to move left arm and left lower extremities . Cont monitored.     Problem: Fall Risk - Rehab  Goal: Patient will remain free from falls  Outcome: Progressing  Note: Orly Smith Fall risk Assessment Score: 7    Low fall risk interventions   - Call light within reach   - Yellow  socks   - Belongings within reach   - Bed in the lowest position          The patient is Stable - Low risk of patient condition declining or worsening    Shift Goals  Clinical Goals: Safety    Progress made toward(s) clinical / shift goals:  Pt free from fall and injury.

## 2022-08-12 LAB
EKG IMPRESSION: NORMAL
T4 FREE SERPL-MCNC: 2.08 NG/DL (ref 0.93–1.7)
TSH SERPL DL<=0.005 MIU/L-ACNC: 3.08 UIU/ML (ref 0.38–5.33)

## 2022-08-12 PROCEDURE — 97530 THERAPEUTIC ACTIVITIES: CPT

## 2022-08-12 PROCEDURE — 97110 THERAPEUTIC EXERCISES: CPT

## 2022-08-12 PROCEDURE — 700102 HCHG RX REV CODE 250 W/ 637 OVERRIDE(OP): Performed by: PHYSICAL MEDICINE & REHABILITATION

## 2022-08-12 PROCEDURE — 99232 SBSQ HOSP IP/OBS MODERATE 35: CPT | Performed by: PHYSICAL MEDICINE & REHABILITATION

## 2022-08-12 PROCEDURE — 99232 SBSQ HOSP IP/OBS MODERATE 35: CPT | Performed by: HOSPITALIST

## 2022-08-12 PROCEDURE — 93010 ELECTROCARDIOGRAM REPORT: CPT | Performed by: INTERNAL MEDICINE

## 2022-08-12 PROCEDURE — 700102 HCHG RX REV CODE 250 W/ 637 OVERRIDE(OP): Performed by: HOSPITALIST

## 2022-08-12 PROCEDURE — 97116 GAIT TRAINING THERAPY: CPT

## 2022-08-12 PROCEDURE — 92523 SPEECH SOUND LANG COMPREHEN: CPT

## 2022-08-12 PROCEDURE — A9270 NON-COVERED ITEM OR SERVICE: HCPCS | Performed by: HOSPITALIST

## 2022-08-12 PROCEDURE — 770010 HCHG ROOM/CARE - REHAB SEMI PRIVAT*

## 2022-08-12 PROCEDURE — 97535 SELF CARE MNGMENT TRAINING: CPT

## 2022-08-12 PROCEDURE — A9270 NON-COVERED ITEM OR SERVICE: HCPCS | Performed by: PHYSICAL MEDICINE & REHABILITATION

## 2022-08-12 RX ORDER — LEVOTHYROXINE SODIUM 0.05 MG/1
100 TABLET ORAL
Status: DISCONTINUED | OUTPATIENT
Start: 2022-08-13 | End: 2022-08-22 | Stop reason: HOSPADM

## 2022-08-12 RX ADMIN — ATORVASTATIN CALCIUM 80 MG: 40 TABLET, FILM COATED ORAL at 21:02

## 2022-08-12 RX ADMIN — NYSTATIN 500000 UNITS: 100000 SUSPENSION ORAL at 07:47

## 2022-08-12 RX ADMIN — METOPROLOL TARTRATE 25 MG: 25 TABLET, FILM COATED ORAL at 17:15

## 2022-08-12 RX ADMIN — LEVOTHYROXINE SODIUM 112 MCG: 112 TABLET ORAL at 06:12

## 2022-08-12 RX ADMIN — NYSTATIN 500000 UNITS: 100000 SUSPENSION ORAL at 12:35

## 2022-08-12 RX ADMIN — OMEPRAZOLE 20 MG: 20 CAPSULE, DELAYED RELEASE ORAL at 07:44

## 2022-08-12 RX ADMIN — NYSTATIN 500000 UNITS: 100000 SUSPENSION ORAL at 21:02

## 2022-08-12 RX ADMIN — ACETAMINOPHEN 650 MG: 325 TABLET ORAL at 09:36

## 2022-08-12 RX ADMIN — METOPROLOL TARTRATE 25 MG: 25 TABLET, FILM COATED ORAL at 05:12

## 2022-08-12 RX ADMIN — RIVAROXABAN 20 MG: 20 TABLET, FILM COATED ORAL at 17:15

## 2022-08-12 RX ADMIN — NYSTATIN 500000 UNITS: 100000 SUSPENSION ORAL at 17:15

## 2022-08-12 ASSESSMENT — ENCOUNTER SYMPTOMS
VOMITING: 0
DIARRHEA: 0
SHORTNESS OF BREATH: 0
FEVER: 0
NERVOUS/ANXIOUS: 0
NAUSEA: 0
CHILLS: 0
ABDOMINAL PAIN: 0

## 2022-08-12 ASSESSMENT — ACTIVITIES OF DAILY LIVING (ADL)
TOILET_TRANSFER_DESCRIPTION: GRAB BAR;INCREASED TIME;VERBAL CUEING;SUPERVISION FOR SAFETY
BED_CHAIR_WHEELCHAIR_TRANSFER_DESCRIPTION: INCREASED TIME;SUPERVISION FOR SAFETY;VERBAL CUEING

## 2022-08-12 ASSESSMENT — GAIT ASSESSMENTS
DEVIATION: BRADYKINETIC
GAIT LEVEL OF ASSIST: STANDBY ASSIST
DISTANCE (FEET): 300

## 2022-08-12 ASSESSMENT — PAIN DESCRIPTION - PAIN TYPE: TYPE: ACUTE PAIN

## 2022-08-12 NOTE — DISCHARGE PLANNING
EVIE LM for patients supervisor, Heaven, at [facility] for call back.  CM will continue to monitor for DC needs.

## 2022-08-12 NOTE — PROGRESS NOTES
Rehab Progress Note     Date of Service: 8/12/2022  Chief Complaint: follow up stroke    Interval Events (Subjective)    Patient seen and examined today in the therapy gym.  Yesterday she converted back to sinus rhythm and was seen and evaluated by the hospitalist.  She was found to have elevated T4 on this morning labs so her levothyroxine has been adjusted.  She has questions about paperwork from her employer, which was referred to .  Per speech therapy was found to have mild cognitive impairments.  Patient has no other new questions, concerns, or complaints today.       ROS: No changes to bowel, bladder, pain, mood, or sleep.         Objective:  VITAL SIGNS: /59   Pulse 68   Temp 36.2 °C (97.2 °F) (Oral)   Resp 18   Ht 1.524 m (5')   Wt 63.5 kg (139 lb 15.9 oz)   SpO2 96%   BMI 27.34 kg/m²   Gen: alert, no apparent distress  CV: Regular rate, regular sinus rhythm  Resp: Clear to auscultation bilaterally  Neuro: Left hemiparesis  Psych: Emotionally labile and tearful        Recent Results (from the past 72 hour(s))   COV-2, FLU A/B, AND RSV BY PCR (2-4 HOURS [facility]): Collect NP swab in [facility]    Collection Time: 08/10/22  1:00 PM    Specimen: Respirate   Result Value Ref Range    Influenza virus A RNA Negative Negative    Influenza virus B, PCR Negative Negative    RSV, PCR Negative Negative    SARS-CoV-2 by PCR NotDetected     SARS-CoV-2 Source NP Swab    CBC with Differential    Collection Time: 08/11/22  5:16 AM   Result Value Ref Range    WBC 6.8 4.8 - 10.8 K/uL    RBC 5.26 4.20 - 5.40 M/uL    Hemoglobin 16.9 (H) 12.0 - 16.0 g/dL    Hematocrit 48.4 (H) 37.0 - 47.0 %    MCV 92.0 81.4 - 97.8 fL    MCH 32.1 27.0 - 33.0 pg    MCHC 34.9 33.6 - 35.0 g/dL    RDW 41.4 35.9 - 50.0 fL    Platelet Count 378 164 - 446 K/uL    MPV 9.4 9.0 - 12.9 fL    Neutrophils-Polys 45.10 44.00 - 72.00 %    Lymphocytes 36.60 22.00 - 41.00 %    Monocytes 14.30 (H) 0.00 - 13.40 %    Eosinophils 2.40 0.00 - 6.90 %     Basophils 1.00 0.00 - 1.80 %    Immature Granulocytes 0.60 0.00 - 0.90 %    Nucleated RBC 0.00 /100 WBC    Neutrophils (Absolute) 3.05 2.00 - 7.15 K/uL    Lymphs (Absolute) 2.48 1.00 - 4.80 K/uL    Monos (Absolute) 0.97 (H) 0.00 - 0.85 K/uL    Eos (Absolute) 0.16 0.00 - 0.51 K/uL    Baso (Absolute) 0.07 0.00 - 0.12 K/uL    Immature Granulocytes (abs) 0.04 0.00 - 0.11 K/uL    NRBC (Absolute) 0.00 K/uL   Comp Metabolic Panel (CMP)    Collection Time: 22  5:16 AM   Result Value Ref Range    Sodium 136 135 - 145 mmol/L    Potassium 4.0 3.6 - 5.5 mmol/L    Chloride 101 96 - 112 mmol/L    Co2 24 20 - 33 mmol/L    Anion Gap 11.0 7.0 - 16.0    Glucose 98 65 - 99 mg/dL    Bun 19 8 - 22 mg/dL    Creatinine 0.78 0.50 - 1.40 mg/dL    Calcium 9.1 8.5 - 10.5 mg/dL    AST(SGOT) 18 12 - 45 U/L    ALT(SGPT) 29 2 - 50 U/L    Alkaline Phosphatase 83 30 - 99 U/L    Total Bilirubin 0.6 0.1 - 1.5 mg/dL    Albumin 3.8 3.2 - 4.9 g/dL    Total Protein 6.2 6.0 - 8.2 g/dL    Globulin 2.4 1.9 - 3.5 g/dL    A-G Ratio 1.6 g/dL   Magnesium    Collection Time: 22  5:16 AM   Result Value Ref Range    Magnesium 2.0 1.5 - 2.5 mg/dL   Vitamin D, 25-hydroxy (blood)    Collection Time: 22  5:16 AM   Result Value Ref Range    25-Hydroxy   Vitamin D 25 32 30 - 100 ng/mL   ESTIMATED GFR    Collection Time: 22  5:16 AM   Result Value Ref Range    GFR (CKD-EPI) 85 >60 mL/min/1.73 m 2   FREE THYROXINE    Collection Time: 22  5:16 AM   Result Value Ref Range    Free T-4 2.08 (H) 0.93 - 1.70 ng/dL   TSH    Collection Time: 22  5:16 AM   Result Value Ref Range    TSH 3.080 0.380 - 5.330 uIU/mL   EKG    Collection Time: 22  2:19 PM   Result Value Ref Range    Report       [facility]    Test Date:  2022  Pt Name:    TONYAGUSTINA WHITE                  Department: [facility]  MRN:        [id number]                      Room:       [facility]  Gender:     Female                       Technician: BEV  :        [id number]                    Requested By:NORA MARTINEZ CADY  Order #:    [id number]                    Reading MD: Deyvi Guardado MD    Measurements  Intervals                                Axis  Rate:       157                          P:          0  NM:         116                          QRS:        83  QRSD:       80                           T:          -41  QT:         308  QTc:        498    Interpretive Statements  SVT probably Atrial Fibrillation given irregularity  BORDERLINE RIGHT AXIS DEVIATION  REPOLARIZATION ABNORMALITY, PROB RATE RELATED  Compared to ECG 02/14/2022 16:47:13  Probable A fib with RVR is now present  Electronically Signed On 8- 4:57:44 PDT by Deyvi Guardado MD         Current Facility-Administered Medications   Medication Frequency    [START ON 8/13/2022] levothyroxine (SYNTHROID) tablet 100 mcg AM ES    hydrOXYzine HCl (ATARAX) tablet 50 mg Q6HRS PRN    metoprolol tartrate (LOPRESSOR) tablet 25 mg TWICE DAILY    melatonin tablet 3 mg HS PRN    Respiratory Therapy Consult Continuous RT    Pharmacy Consult Request ...Pain Management Review 1 Each PHARMACY TO DOSE    acetaminophen (Tylenol) tablet 650 mg Q4HRS PRN    senna-docusate (PERICOLACE or SENOKOT S) 8.6-50 MG per tablet 2 Tablet BID    And    polyethylene glycol/lytes (MIRALAX) PACKET 1 Packet QDAY PRN    And    magnesium hydroxide (MILK OF MAGNESIA) suspension 30 mL QDAY PRN    And    bisacodyl (DULCOLAX) suppository 10 mg QDAY PRN    lactulose 20 GM/30ML solution 30 mL QDAY PRN    docusate sodium (ENEMEEZ) enema 283 mg QDAY PRN    omeprazole (PRILOSEC) capsule 20 mg QAM AC    artificial tears ophthalmic solution 1 Drop PRN    mag hydrox-al hydrox-simeth (MAALOX PLUS ES or MYLANTA DS) suspension 20 mL Q2HRS PRN    ondansetron (ZOFRAN ODT) dispertab 4 mg 4X/DAY PRN    Or    ondansetron (ZOFRAN) syringe/vial injection 4 mg 4X/DAY PRN    traZODone (DESYREL) tablet 50 mg QHS PRN    sodium chloride (OCEAN) 0.65 % nasal spray 2 [place] PRN    hydrALAZINE  (APRESOLINE) tablet 10 mg Q8HRS PRN    rivaroxaban (XARELTO) tablet 20 mg PM MEAL    atorvastatin (LIPITOR) tablet 80 mg Q EVENING    midazolam (VERSED) 5 mg/mL (1 mL vial) PRN    nystatin (MYCOSTATIN) 427849 UNIT/ML suspension 500,000 Units 4X/DAY       Orders Placed This Encounter   Procedures    Diet Order Diet: Level 6 - Soft and Bite Sized; Liquid level: Level 0 - Thin     Standing Status:   Standing     Number of Occurrences:   1     Order Specific Question:   Diet:     Answer:   Level 6 - Soft and Bite Sized [23]     Order Specific Question:   Liquid level     Answer:   Level 0 - Thin       Assessment:    This patient is a 62 y.o. female admitted for acute inpatient rehabilitation with Ischemic stroke (HCC).    Problem List/Medical Decision Making and Plan:    Bilateral strokes  Right frontal parietal  Left frontal  Left hemiparesis, arm >leg  Non-dominant  Cardioembolic  Continue full rehab program  PT/OT/SLP, 1 hr each discipline, 5 days per week    Xarelto  Statin    Outpatient follow-up with [facility]    Paroxysmal atrial fibrillation  Atrial fibrillation with RVR 8/11  Metoprolol  Xarelto  Checked EKG, RVR with rate 157 8/11 -likely secondary to her morning dose metoprolol held due to mild bradycardia and hypotension  Hospitalist consulted, appreciate assistance    Hypertension  Hypotension 8/11  Metoprolol  Lisinopril, discontinued  Appreciate hospitalist assistance    Hypothyroidism  History of thyroidectomy  Levothyroxine  Per patient dose recently decreased  Checked TSH, normal TSH, elevated free T4  Dose of levothyroxine decreased  Appreciate hospitalist assistance    GI prophylaxis  Omeprazole     Oral thrush  Continue nystatin    Bowel program  Continue bowel medications  Last BM 8/10    Bladder program  Check PVRs - 40  Bladder scan for no voids  ICP for over 400 cc  Scheduled toileting     DVT prophylaxis  Xarelto    Total time:  26 minutes.  I spent greater than 50% of the time for  patient care, counseling, and coordination on this date, including patient face-to face time, unit/floor time with review of records/pertinent lab data and studies, as well as discussing diagnostic evaluation/work up, planned therapeutic interventions, and future disposition of care, as per the interval events/subjective and the assessment and plan as noted above.    I have performed a physical exam, reviewed and updated ROS, as well as the assessment and plan today 8/12/2022. In review of note from 8/11/2022 there are no new changes except as documented above.          Cheryl Sebastian M.D.  Physical Medicine and Rehabilitation

## 2022-08-12 NOTE — DISCHARGE PLANNING
CM met with patient to discuss CM and IDT coming up.  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  CM answered questions.  Patient was emotional on and off.  CM offered support.  CM available as needs arise.

## 2022-08-12 NOTE — CARE PLAN
The patient is Stable - Low risk of patient condition declining or worsening      Problem: Fall Risk - Rehab  Goal: Patient will remain free from falls  Note: Patient uses call light consistently and appropriately this shift.  Waits for assistance when needed and does not attempt self transfer.  Able to verbalize needs.  Will continue to monitor.      Problem: Skin Integrity  Goal: Patient's skin integrity will be maintained or improve  Note: Patient's skin remains intact and free from new or accidental injury this shift.  Will continue to monitor.

## 2022-08-12 NOTE — THERAPY
Physical Therapy   Daily Treatment     Patient Name: Jess Root  Age:  62 y.o., Sex:  female  Medical Record #: [id number]  Today's Date: 8/12/2022     Precautions  Precautions: Fall Risk  Comments: monitor BP and HR    Subjective    Patient is seated up in chair, agreeable to participate. Willing to work on walking in the damon without assistive device     Objective       08/12/22 1401   PT Charge Group   PT Gait Training 2   PT Total Time Spent   PT Individual Total Time Spent (Mins) 30   Gait Functional Level of Assist    Gait Level Of Assist Standby Assist   Assistive Device None   Distance (Feet) 300  (then another 250 ft)   # of Times Distance was Traveled 2   Deviation Bradykinetic   Transfer Functional Level of Assist   Bed, Chair, Wheelchair Transfer Standby Assist   Bed Chair Wheelchair Transfer Description Increased time;Supervision for safety;Verbal cueing   Toilet Transfers Standby Assist   Toilet Transfer Description Grab bar;Increased time;Verbal cueing;Supervision for safety   Bed Mobility    Supine to Sit Standby Assist   Sit to Supine Standby Assist   Sit to Stand Standby Assist   Scooting Supervised   Interdisciplinary Plan of Care Collaboration   Patient Position at End of Therapy In Bed;Call Light within Reach;Tray Table within Reach;Phone within Reach       Assessment    Patient demonstrates slow gait speed compared to age matched norms and her self described baseline, although she is self-reportedly nervous about pushing too hard initially. She does not demonstrate an appreciable foot slap/ foot drop as she fatigues. Hot pack placed over R shoulder after ambulation in halls per patient's request due to neck tightness. She reported some resolution of her symptoms.     Strengths: Able to follow instructions, Effective communication skills, Independent prior level of function, Motivated for self care and independence, Pleasant and cooperative, Willingly participates in therapeutic activities,  Good carryover of learning, Good insight into deficits/needs    Plan    Standing dynamic balance, gait speed, fall recovery    Passport items to be completed:  Get in/out of bed safely, in/out of a vehicle, safely use mobility device, walk or wheel around home/community, navigate up and down stairs, show how to get up/down from the ground, ensure home is accessible, demonstrate HEP, complete caregiver training    Physical Therapy Problems (Active)       Problem: Balance       Dates: Start: 08/11/22         Goal: STG-Within one week, patient will maintain dynamic standing on foam with reaching tasks       Dates: Start: 08/11/22               Problem: Mobility       Dates: Start: 08/11/22         Goal: STG-Within one week, patient will ambulate community distances with LRAD with supervision assist       Dates: Start: 08/11/22            Goal: STG-Within one week, patient will ambulate up/down flight of stairs with supervision assist       Dates: Start: 08/11/22               Problem: PT-Long Term Goals       Dates: Start: 08/11/22         Goal: LTG-By discharge, patient will tolerate standing x 15 minutes while performing standing dynamic tasks       Dates: Start: 08/11/22            Goal: LTG-By discharge, patient will ambulate up/down flight of stairs independently       Dates: Start: 08/11/22            Goal: LTG-By discharge, patient will transfer in/out of a car independently.       Dates: Start: 08/11/22

## 2022-08-12 NOTE — THERAPY
"Occupational Therapy  Daily Treatment     Patient Name: Jess Root  Age:  62 y.o., Sex:  female  Medical Record #: [id number]  Today's Date: 8/12/2022     Precautions  Precautions: Fall Risk  Comments: monitor BP and HR         Subjective    \"Can I just have females only for my showers?\" Scheduling notified at end of session.     Objective       08/12/22 1031   OT Charge Group   OT Self Care / ADL 2   OT Total Time Spent   OT Individual Total Time Spent (Mins) 30   Precautions   Precautions Fall Risk   Comments monitor BP and HR   Pain   Intervention Therapeutic Massage   Pain 0 - 10 Group   Location Neck   Location Orientation Left   Description Aching   Comfort Goal Comfort with Movement;Perform Activity   Therapist Pain Assessment Prior to Activity   Cognition    Level of Consciousness Alert   Functional Level of Assist   Grooming Supervision   Grooming Description Verbal cueing;Supervision for safety;Seated in wheelchair at sink  (Cues to use LUE in supportive role during oral care with open/close of toothpaste (attempted to use teeth initially.))   Upper Body Dressing Stand by Assist   Upper Body Dressing Description Supervision for safety;Verbal cueing;Set-up of equipment;Increased time  (Increased time and education on use of tomi-technique, pt able to don/doff pullover shirt.)   Lower Body Dressing Minimal Assist   Lower Body Dressing Description Increased time;Initial preparation for task;Set-up of equipment;Supervision for safety;Verbal cueing  (Cues for initiating LLE with threading, extra time due to non-skid socks, Min A for balance in standing during draw up.)   Interdisciplinary Plan of Care Collaboration   IDT Collaboration with  Physical Therapist   Patient Position at End of Therapy Seated;Chair Alarm On  (Pass off to PT)   Collaboration Comments CLOF       Assessment    Pt receptive to tomi-technique education during dressing skills, and required increased time due to verbosity, but making " very good progress towards goals. Continued education and training needed for ADLs. Noted increased LUE mobility and utilization when compared to previous notes.  Strengths: Able to follow instructions, Alert and oriented, Good carryover of learning, Independent prior level of function, Manages pain appropriately, Motivated for self care and independence, Pleasant and cooperative, Willingly participates in therapeutic activities  Barriers: Fatigue, Decreased endurance, Hemiparesis, Home accessibility, Impaired activity tolerance, Impaired balance, Impaired functional cognition    Plan    LUE NMRE, FMC/GMC, standing balance, functional transfers.    Occupational Therapy Goals (Active)       Problem: Bathing       Dates: Start: 08/11/22         Goal: STG-Within one week, patient will bathe with Min A overall utilizing DME/AE as needed.       Dates: Start: 08/11/22               Problem: Dressing       Dates: Start: 08/11/22         Goal: STG-Within one week, patient will dress UB with Min A overall utilizing DME/AE as needed.       Dates: Start: 08/11/22            Goal: STG-Within one week, patient will dress LB with Min A overall utilizing DME/AE as needed.       Dates: Start: 08/11/22               Problem: Functional Transfers       Dates: Start: 08/11/22         Goal: STG-Within one week, patient will transfer to toilet with SBA overall utilizing DME/AE as needed.       Dates: Start: 08/11/22               Problem: Grooming       Dates: Start: 08/11/22         Goal: STG-Within one week, patient will complete grooming with set-up A overall utilizing DME/AE as needed.       Dates: Start: 08/11/22               Problem: OT Long Term Goals       Dates: Start: 08/11/22         Goal: LTG-By discharge, patient will complete basic self care tasks Mod I overall utilizing DME/AE as needed.       Dates: Start: 08/11/22            Goal: LTG-By discharge, patient will perform bathroom transfers with Mod I overall utilizing  DME/AE as needed.       Dates: Start: 08/11/22

## 2022-08-12 NOTE — PROGRESS NOTES
[facility] Medicine Daily Progress Note    Date of Service  8/12/2022    Chief Complaint:  Hypertension  PAF    Interval History:  Discussed with pt about her TFT's being abnormal and have adjusted her Synthroid down a little.    Review of Systems  Review of Systems   Constitutional:  Negative for chills and fever.   Respiratory:  Negative for shortness of breath.    Cardiovascular:  Negative for chest pain.   Gastrointestinal:  Negative for abdominal pain, diarrhea, nausea and vomiting.   Psychiatric/Behavioral:  The patient is not nervous/anxious.       Physical Exam  Temp:  [36.2 °C (97.2 °F)-36.5 °C (97.7 °F)] 36.2 °C (97.2 °F)  Pulse:  [64-86] 68  Resp:  [18] 18  BP: (102-127)/(59-87) 127/59  SpO2:  [91 %-96 %] 96 %    Physical Exam  Vitals and nursing note reviewed.   Constitutional:       Appearance: Normal appearance.   HENT:      Head: Atraumatic.   Eyes:      Conjunctiva/sclera: Conjunctivae normal.      Pupils: Pupils are equal, round, and reactive to light.   Cardiovascular:      Rate and Rhythm: Normal rate. Rhythm irregular.   Pulmonary:      Effort: Pulmonary effort is normal.      Breath sounds: Normal breath sounds.   Abdominal:      General: Bowel sounds are normal.      Palpations: Abdomen is soft.   Musculoskeletal:      Cervical back: Normal range of motion and neck supple.      Right lower leg: No edema.      Left lower leg: No edema.   Skin:     General: Skin is warm and dry.   Neurological:      Mental Status: She is alert and oriented to person, place, and time.   Psychiatric:         Mood and Affect: Mood normal.         Behavior: Behavior normal.       Fluids    Intake/Output Summary (Last 24 hours) at 8/12/2022 0846  Last data filed at 8/11/2022 2100  Gross per 24 hour   Intake 100 ml   Output --   Net 100 ml       Laboratory  Recent Labs     08/11/22  0516   WBC 6.8   RBC 5.26   HEMOGLOBIN 16.9*   HEMATOCRIT 48.4*   MCV 92.0   MCH 32.1   MCHC 34.9   RDW 41.4   PLATELETCT 378   MPV 9.4      Recent Labs     08/11/22  0516   SODIUM 136   POTASSIUM 4.0   CHLORIDE 101   CO2 24   GLUCOSE 98   BUN 19   CREATININE 0.78   CALCIUM 9.1                   Imaging    Assessment/Plan  * Ischemic stroke (HCC)- (present on admission)  Assessment & Plan  MRI: showed bilateral strokes, largest in right frontal  parietal lobe in the SOPHIA territory, smaller in the left frontal lobe, also in the SOPHIA territory  S/P tPA (at [facility])  On Xarelto  On Lipitor    Erythrocytosis- (present on admission)  Assessment & Plan  H&H mildly elevated  Suspect 2nd to dehydration  Encouraging fluid intake  Consider IVF's if not improving  Note: if persists, would recommend Hematology as an out patient  Monitor    Essential hypertension- (present on admission)  Assessment & Plan  BP ok but dipped a little lower today at 94/64 (possibly from some dehydration)  Off Lisinopril (last dose this am 8/11)  On Lopressor  Note: did not get Lopressor this am 2nd to lower HR -- parameters have been re-adjusted  Note: home meds include Lopressor 25 mg bid  Cont to monitor    Paroxysmal atrial fibrillation (HCC)- (present on admission)  Assessment & Plan  HR recently ok  (8/11): HR was noted to be fast during PT -- got EKG             S/P Lopressor 25 mg x 1 this afternoon (did not get Lopressor 25 mg this am due to slightly lower HR)             gave 12.5 mg Lopressor on night of 8/11 since pt just got 25 mg at 2:30 pm  EKG (8/11): SVT at 157  TFT's: abn --> see other assessment  On Lopressor: 25 mg bid  Note: Lopressor hold parameters adjusted  Cont to monitor    Hypothyroidism- (present on admission)  Assessment & Plan  TFT's ok (5/2022)  TSH: 3.08 (8/12)  FT4: 2.08 (8/12)  On Synthroid --> will decrease dose  Note: pt has an on & off Hx of mildly elevated FT4  Note: suggest repeat TFT's in about 6 weeks

## 2022-08-12 NOTE — THERAPY
Occupational Therapy  Daily Treatment     Patient Name: Jess Root  Age:  62 y.o., Sex:  female  Medical Record #: [id number]  Today's Date: 8/12/2022     Precautions  Precautions: (P) Fall Risk  Comments: (P) monitor BP and HR         Subjective    Patient stated she was feeling a strain in her left neck due to working on L UE exercises during the day.  Requested heat.     Objective       08/12/22 1301   OT Charge Group   OT Therapy Activity 2   OT Total Time Spent   OT Individual Total Time Spent (Mins) 30   Precautions   Precautions Fall Risk   Comments monitor BP and HR   Pain   Intervention Held    Pain 0 - 10 Group   Location Neck   Location Orientation Left   Interdisciplinary Plan of Care Collaboration   Patient Position at End of Therapy Seated;Call Light within Reach;Tray Table within Reach;Phone within Reach     AAROM L finger extensors with active finger flexion.      L UE reaching with associated grasp and release of large cones on tabletop.      Assessment    Patient with continued improvement in L UE AROM and strength with good motivation.    Strengths: Able to follow instructions, Alert and oriented, Good carryover of learning, Independent prior level of function, Manages pain appropriately, Motivated for self care and independence, Pleasant and cooperative, Willingly participates in therapeutic activities  Barriers: Fatigue, Decreased endurance, Hemiparesis, Home accessibility, Impaired activity tolerance, Impaired balance, Impaired functional cognition    Plan    ADLs, IADLs, functional mobility, L UE GM/FMC; Distal L UE strengthening    Occupational Therapy Goals (Active)       Problem: Bathing       Dates: Start: 08/11/22         Goal: STG-Within one week, patient will bathe with Min A overall utilizing DME/AE as needed.       Dates: Start: 08/11/22               Problem: Dressing       Dates: Start: 08/11/22         Goal: STG-Within one week, patient will dress UB with Min A overall  utilizing DME/AE as needed.       Dates: Start: 08/11/22            Goal: STG-Within one week, patient will dress LB with Min A overall utilizing DME/AE as needed.       Dates: Start: 08/11/22               Problem: Functional Transfers       Dates: Start: 08/11/22         Goal: STG-Within one week, patient will transfer to toilet with SBA overall utilizing DME/AE as needed.       Dates: Start: 08/11/22               Problem: Grooming       Dates: Start: 08/11/22         Goal: STG-Within one week, patient will complete grooming with set-up A overall utilizing DME/AE as needed.       Dates: Start: 08/11/22               Problem: OT Long Term Goals       Dates: Start: 08/11/22         Goal: LTG-By discharge, patient will complete basic self care tasks Mod I overall utilizing DME/AE as needed.       Dates: Start: 08/11/22            Goal: LTG-By discharge, patient will perform bathroom transfers with Mod I overall utilizing DME/AE as needed.       Dates: Start: 08/11/22

## 2022-08-12 NOTE — THERAPY
Speech Language Pathology   Initial Assessment     Patient Name: Jess Root  AGE:  62 y.o., SEX:  female  Medical Record #: [id number]  Today's Date: 8/12/2022     Subjective    Cognitive evaluation orders received, pt reporting feeling much better today, pleasant and cooperative throughout assessment.      Objective       08/12/22 0933   Evaluation Charges   SLP Speech Language Evaluation Speech Sound Language Comprehension   SLP Total Time Spent   SLP Individual Total Time Spent (Mins) 60   Functional Level of Assist   Eating Supervision   Eating Description Modified diet;Verbal cueing   Comprehension Modified Independent   Comprehension Description Glasses   Expression Supervision   Social Interaction Modified Independent   Problem Solving Supervision   Memory Moderate Assist   Outcome Measures   Outcome Measures Utilized SCCAN   SCCAN (Scales of Cognitive and Communicative Ability for Neurorehabilitation)   Oral Expression - Raw Score 18   Oral Expression - Scale Performance Score 95   Orientation - Raw Score 12   Orientation - Scale Performance Score 100   Memory - Raw Score 10   Memory - Scale Performance Score 53   Speech Comprehension - Raw Score 12   Speech Comprehension - Scale Performance Score 92   Reading Comprehension - Raw Score 10   Reading Comprehension - Scale Performance Score 83   Writing - Raw Score 6   Writing - Scale Performance Score 86   Attention - Raw Score 13   Attention - Scale Performance Score 81   Problem Solving - Raw Score 21   Problem Solving - Scale Performance Score 91   SCCAN Total Raw Score 80   SCCAN Degree of Severity Mild Impairment   Problem List   Problem List Attention Deficit;Executive Function Deficit;Memory Deficit   Current Discharge Plan   Current Discharge Plan Return to Prior Living Situation   Benefit   Therapy Benefit Patient would benefit from Inpatient Rehab Speech-Language Pathology to address above identified deficits.   Interdisciplinary Plan of Care  Collaboration   IDT Collaboration with  Physician   Patient Position at End of Therapy Seated;Chair Alarm On;Call Light within Reach;Tray Table within Reach;Phone within Reach   Collaboration Comments MILD cognitive impairments following cognitive assessment   Speech Language Pathologist Assigned   Assigned SLP / Extension LC 60 NO SPLIT tdine       Assessment    Patient is 62 y.o. female with a diagnosis of CVA. Additional factors influencing patient status/progress (ie: cognitive factors, co-morbidities, social support, etc): pt indep and able to care for self, working full time as a . Pt reports decreased processing and impaired memory.     Cognitive assessment completed with use of SCCAN, pt achieved a raw score of 80 which indicates MILD cognitive impairments overall. Pt with greatest impairment in are of short term memory 53% as well as noted deficits with complex attention and organization tasks. Pt would benefit from cognitive intervention to address the above mentioned areas as for pts goal it to return to PLOF and working full time.     Plan  Recommend Speech Therapy 30-60 minutes per day 5-6 days per week for 2 weeks for the following treatments:  SLP Self Care / ADL Training , SLP Cognitive Skill Development, and SLP Group Treatment.    Goals:  Long term and short term goals have been discussed with patient and they are in agreement.    Speech Therapy Problems (Active)       Problem: Memory STGs       Dates: Start: 08/12/22         Goal: STG-Within one week, patient will learn and implement functional memory strategies to assist in recall of information related to stroke and daily tasks with 80% accuracy provided MIN A.       Dates: Start: 08/12/22               Problem: Problem Solving STGs       Dates: Start: 08/12/22         Goal: STG-Within one week, patient will complete functional medication management and financial management tasks with 80% accuracy provided SPV.       Dates: Start:  08/12/22               Problem: Speech/Swallowing LTGs       Dates: Start: 08/11/22         Goal: LTG-By discharge, patient will safely swallow regular textures with no overt s/sx of asp/pen noted with 100% accuracy provided MOD I       Dates: Start: 08/11/22            Goal: LTG-By discharge, patient will complete functional problem solving and recall information with 80% accuracy provided MOD I       Dates: Start: 08/12/22               Problem: Swallowing STGs       Dates: Start: 08/11/22         Goal: STG-Within one week, patient will safely swallow trials of regular textures with no overt s/sx of asp/pen noted provided SPV       Dates: Start: 08/11/22

## 2022-08-12 NOTE — CARE PLAN
The patient is Stable - Low risk of patient condition declining or worsening    Shift Goals  Clinical Goals: Safety      Problem: Fall Risk - Rehab  Goal: Patient will remain free from falls  Outcome: Progressing  Note: Orly Smith Fall risk Assessment Score: 7    Low fall risk interventions   - Call light within reach   - Yellow  socks   - Belongings within reach   - Bed in the lowest position            Problem: Skin Integrity  Goal: Patient's skin integrity will be maintained or improve  Outcome: Progressing  Note:   Anthony Score: 19    Patient's skin remains intact and free from new or accidental injury this shift; no s/s of infection. RN wound protocol checked. Encouraged hydration and educated about the importance of nutrition to keep skin integrity. Will continue to monitor.

## 2022-08-12 NOTE — THERAPY
Physical Therapy   Daily Treatment     Patient Name: Jess Root  Age:  62 y.o., Sex:  female  Medical Record #: [id number]  Today's Date: 8/12/2022     Precautions  Precautions: Fall Risk  Comments: monitor BP and heart rate    Subjective    Patient is just finishing working with OT, is agreeable to participate     Objective       08/12/22 1101   PT Charge Group   PT Gait Training 1   PT Therapeutic Exercise 1   PT Total Time Spent   PT Individual Total Time Spent (Mins) 30   Stairs Functional Level of Assist   Level of Assist with Stairs Contact Guard Assist   # of Stairs Climbed 14  (2 sets of 6inch steps, 1 set of 4inch steps)   Stairs Description Extra time;Hand rails;Limited by fatigue;Safety concerns;Supervision for safety;Verbal cueing   Transfer Functional Level of Assist   Bed, Chair, Wheelchair Transfer Standby Assist   Sitting Lower Body Exercises   Sitting Lower Body Exercises Yes   Nustep Resistance Level 3   Comments 10 mins, 319 steps   Interdisciplinary Plan of Care Collaboration   IDT Collaboration with  Physician   Patient Position at End of Therapy Seated;Chair Alarm On;Self Releasing Lap Belt Applied;Call Light within Reach;Tray Table within Reach;Phone within Reach   Collaboration Comments CLOF       Assessment    Patient demonstrates good safety on stairs with bilateral rail, intermittently performs step to and step through during ascend and descend. She is also anxious about pushing too hard at first, wants to wait for walking in the halls until this afternoon.     Strengths: Able to follow instructions, Effective communication skills, Independent prior level of function, Motivated for self care and independence, Pleasant and cooperative, Willingly participates in therapeutic activities, Good carryover of learning, Good insight into deficits/needs    Plan    Standing balance, cardiovascular endurance with gait, fall recovery    Passport items to be completed:  Get in/out of bed safely,  in/out of a vehicle, safely use mobility device, walk or wheel around home/community, navigate up and down stairs, show how to get up/down from the ground, ensure home is accessible, demonstrate HEP, complete caregiver training    Physical Therapy Problems (Active)       Problem: Balance       Dates: Start: 08/11/22         Goal: STG-Within one week, patient will maintain dynamic standing on foam with reaching tasks       Dates: Start: 08/11/22               Problem: Mobility       Dates: Start: 08/11/22         Goal: STG-Within one week, patient will ambulate community distances with LRAD with supervision assist       Dates: Start: 08/11/22            Goal: STG-Within one week, patient will ambulate up/down flight of stairs with supervision assist       Dates: Start: 08/11/22               Problem: PT-Long Term Goals       Dates: Start: 08/11/22         Goal: LTG-By discharge, patient will tolerate standing x 15 minutes while performing standing dynamic tasks       Dates: Start: 08/11/22            Goal: LTG-By discharge, patient will ambulate up/down flight of stairs independently       Dates: Start: 08/11/22            Goal: LTG-By discharge, patient will transfer in/out of a car independently.       Dates: Start: 08/11/22

## 2022-08-12 NOTE — DISCHARGE PLANNING
CASE MANAGEMENT INITIAL ASSESSMENT    Admit Date:  8/10/2022     I spoke with patients son to discuss role of case management / discharge planning / team conference.     Patient is a  62 y.o. female transferred from [facility].    Diagnosis: Ischemic stroke (HCC) [I63.9]    Co-morbidities:   Patient Active Problem List    Diagnosis Date Noted    Erythrocytosis 08/11/2022    Ischemic stroke (HCC) 08/10/2022    Paroxysmal atrial fibrillation (HCC) 08/10/2022    Other hyperlipidemia 08/10/2022    Oral thrush 08/10/2022    Impaired mobility and ADLs 08/10/2022    Emotional lability 08/10/2022    Essential hypertension 08/10/2022    Atrial fibrillation with RVR (HCC) 02/15/2022    H/O basal cell carcinoma excision 10/14/2021    Vitamin D deficiency 04/19/2018    Acute meniscal tear of knee 12/13/2016    Seasonal allergic rhinitis 12/13/2016    HSV-2 infection 01/29/2015    Superficial thrombophlebitis 04/01/2010    Hypothyroidism 07/19/2009    Varicose veins of both lower extremities 07/19/2009     Prior Living Situation:  Housing / Facility: [address], Mobile Home  Lives with - Patient's Self Care Capacity: Alone and Able to Care For Self    Prior Level of Function:  Medication Management: Independent  Finances: Independent  Home Management: Independent  Shopping: Independent  Prior Level Of Mobility: Independent Without Device in Community, Independent With Steps in Community, Independent With Device in Home  Driving / Transportation: Driving Independent    Support Systems:  Primary : Gregg Root-son: [phone]    Advance Directives: Yes  Power of  (Name & Phone): None    Previous Services Utilized:   Equipment Owned: None  Prior Services: Home-Independent    Other Information:  Occupation (Pre-Hospital Vocational): Employed Full Time     Primary Payor Source: Other (Comments) ([employer])  Primary Care Practitioner : FRANK Dorsey    Patient / Family Goal:  Patient / Family Goal: 1. Have  home health at DC 2. Have all recommended DME at DC 3. Gain mobility    Plan:  1. Continue to follow patient through hospitalization and provide discharge planning in collaboration with patient, family, physicians and ancillary services.     2. Utilize community resources to ensure a safe discharge.

## 2022-08-13 PROCEDURE — 99232 SBSQ HOSP IP/OBS MODERATE 35: CPT | Performed by: HOSPITALIST

## 2022-08-13 PROCEDURE — 700102 HCHG RX REV CODE 250 W/ 637 OVERRIDE(OP): Performed by: PHYSICAL MEDICINE & REHABILITATION

## 2022-08-13 PROCEDURE — 92526 ORAL FUNCTION THERAPY: CPT

## 2022-08-13 PROCEDURE — 97530 THERAPEUTIC ACTIVITIES: CPT

## 2022-08-13 PROCEDURE — 700102 HCHG RX REV CODE 250 W/ 637 OVERRIDE(OP): Performed by: HOSPITALIST

## 2022-08-13 PROCEDURE — A9270 NON-COVERED ITEM OR SERVICE: HCPCS | Performed by: HOSPITALIST

## 2022-08-13 PROCEDURE — A9270 NON-COVERED ITEM OR SERVICE: HCPCS | Performed by: PHYSICAL MEDICINE & REHABILITATION

## 2022-08-13 PROCEDURE — 770010 HCHG ROOM/CARE - REHAB SEMI PRIVAT*

## 2022-08-13 PROCEDURE — 97535 SELF CARE MNGMENT TRAINING: CPT

## 2022-08-13 RX ADMIN — NYSTATIN 500000 UNITS: 100000 SUSPENSION ORAL at 18:12

## 2022-08-13 RX ADMIN — LEVOTHYROXINE SODIUM 100 MCG: 0.05 TABLET ORAL at 05:33

## 2022-08-13 RX ADMIN — NYSTATIN 500000 UNITS: 100000 SUSPENSION ORAL at 08:53

## 2022-08-13 RX ADMIN — NYSTATIN 500000 UNITS: 100000 SUSPENSION ORAL at 21:05

## 2022-08-13 RX ADMIN — SENNOSIDES AND DOCUSATE SODIUM 2 TABLET: 50; 8.6 TABLET ORAL at 21:05

## 2022-08-13 RX ADMIN — NYSTATIN 500000 UNITS: 100000 SUSPENSION ORAL at 11:25

## 2022-08-13 RX ADMIN — METOPROLOL TARTRATE 25 MG: 25 TABLET, FILM COATED ORAL at 18:12

## 2022-08-13 RX ADMIN — METOPROLOL TARTRATE 25 MG: 25 TABLET, FILM COATED ORAL at 05:33

## 2022-08-13 RX ADMIN — ACETAMINOPHEN 650 MG: 325 TABLET ORAL at 18:12

## 2022-08-13 RX ADMIN — RIVAROXABAN 20 MG: 20 TABLET, FILM COATED ORAL at 18:12

## 2022-08-13 RX ADMIN — OMEPRAZOLE 20 MG: 20 CAPSULE, DELAYED RELEASE ORAL at 08:53

## 2022-08-13 RX ADMIN — SENNOSIDES AND DOCUSATE SODIUM 2 TABLET: 50; 8.6 TABLET ORAL at 11:21

## 2022-08-13 RX ADMIN — ATORVASTATIN CALCIUM 80 MG: 40 TABLET, FILM COATED ORAL at 21:04

## 2022-08-13 ASSESSMENT — ENCOUNTER SYMPTOMS
SHORTNESS OF BREATH: 0
DIZZINESS: 0
PALPITATIONS: 0
VOMITING: 0
FEVER: 0
BLURRED VISION: 0
HEADACHES: 0
NAUSEA: 0
HALLUCINATIONS: 0

## 2022-08-13 ASSESSMENT — PATIENT HEALTH QUESTIONNAIRE - PHQ9
1. LITTLE INTEREST OR PLEASURE IN DOING THINGS: NOT AT ALL
2. FEELING DOWN, DEPRESSED, IRRITABLE, OR HOPELESS: NOT AT ALL
SUM OF ALL RESPONSES TO PHQ9 QUESTIONS 1 AND 2: 0
SUM OF ALL RESPONSES TO PHQ9 QUESTIONS 1 AND 2: 0
1. LITTLE INTEREST OR PLEASURE IN DOING THINGS: NOT AT ALL
2. FEELING DOWN, DEPRESSED, IRRITABLE, OR HOPELESS: NOT AT ALL

## 2022-08-13 ASSESSMENT — PAIN DESCRIPTION - PAIN TYPE: TYPE: ACUTE PAIN

## 2022-08-13 NOTE — CARE PLAN
The patient is Stable - Low risk of patient condition declining or worsening    Shift Goals  Clinical Goals: safety, sleep    Progress made toward(s) clinical / shift goals:      Problem: Neuro Status  Goal: Neuro status will remain stable or improve  Outcome: Progressing  Flowsheets (Taken 8/12/2022 8351)  Level of Consciousness: Alert  Note: Pt stated that she is improving with her left upper extremity motor function.      Problem: Fall Risk - Rehab  Goal: Patient will remain free from falls  Outcome: Progressing  Note: Pt updated on plan of care. Pt encouraged to ask questions and questions were answered. Call light within reach. Pt reminded to use call light whenever needing assistance.

## 2022-08-13 NOTE — CARE PLAN
The patient is Stable - Low risk of patient condition declining or worsening    Shift Goals  Clinical Goals: safety  Patient Goals: rest    Progress made toward(s) clinical / shift goals:     Problem: Knowledge Deficit - Standard  Goal: Patient and family/care givers will demonstrate understanding of plan of care, disease process/condition, diagnostic tests and medications  Outcome: Progressing  Patient denied any pain during shift.      Problem: Fall Risk - Rehab  Goal: Patient will remain free from falls  Outcome: Progressing  Patient use call light for assistance.        Patient is not progressing towards the following goals:

## 2022-08-13 NOTE — PROGRESS NOTES
[facility] Medicine Daily Progress Note    Date of Service  8/13/2022    Chief Complaint:  Hypertension  PAF    Interval History:  No complaints.  Doing ok.    Review of Systems  Review of Systems   Constitutional:  Negative for fever.   Eyes:  Negative for blurred vision.   Respiratory:  Negative for shortness of breath.    Cardiovascular:  Negative for palpitations.   Gastrointestinal:  Negative for nausea and vomiting.   Neurological:  Negative for dizziness and headaches.   Psychiatric/Behavioral:  Negative for hallucinations.       Physical Exam  Temp:  [36.3 °C (97.4 °F)-36.9 °C (98.4 °F)] 36.9 °C (98.4 °F)  Pulse:  [58-78] 59  Resp:  [16-20] 18  BP: (117-138)/(58-74) 138/74  SpO2:  [92 %-96 %] 92 %    Physical Exam  Vitals and nursing note reviewed.   Constitutional:       General: She is not in acute distress.  HENT:      Mouth/Throat:      Mouth: Mucous membranes are moist.      Pharynx: Oropharynx is clear.   Eyes:      General: No scleral icterus.  Cardiovascular:      Rate and Rhythm: Normal rate. Rhythm irregular.   Pulmonary:      Effort: Pulmonary effort is normal.      Breath sounds: No wheezing or rales.   Abdominal:      General: Bowel sounds are normal.      Palpations: Abdomen is soft.   Musculoskeletal:      Cervical back: No rigidity.      Right lower leg: No edema.      Left lower leg: No edema.   Skin:     General: Skin is warm and dry.   Neurological:      Mental Status: She is alert and oriented to person, place, and time.   Psychiatric:         Mood and Affect: Mood normal.         Behavior: Behavior normal.       Fluids    Intake/Output Summary (Last 24 hours) at 8/13/2022 0936  Last data filed at 8/13/2022 0800  Gross per 24 hour   Intake 760 ml   Output --   Net 760 ml         Laboratory  Recent Labs     08/11/22  0516   WBC 6.8   RBC 5.26   HEMOGLOBIN 16.9*   HEMATOCRIT 48.4*   MCV 92.0   MCH 32.1   MCHC 34.9   RDW 41.4   PLATELETCT 378   MPV 9.4       Recent Labs     08/11/22  0537    SODIUM 136   POTASSIUM 4.0   CHLORIDE 101   CO2 24   GLUCOSE 98   BUN 19   CREATININE 0.78   CALCIUM 9.1                     Imaging    Assessment/Plan  * Ischemic stroke (HCC)- (present on admission)  Assessment & Plan  MRI: showed bilateral strokes, largest in right frontal  parietal lobe in the SOPHIA territory, smaller in the left frontal lobe, also in the SOPHIA territory  [facility]/P [facility]PA (at [facility])  On Xarelto  On Lipitor    Erythrocytosis- (present on admission)  Assessment & Plan  H&H mildly elevated  Suspect 2nd to dehydration  Encouraging fluid intake  Consider IVF's if not improving  Note: if persists, would recommend Hematology as an out patient  Monitor    Essential hypertension- (present on admission)  Assessment & Plan  BP ok recently  Off Lisinopril (last dose 8/11 am)  On Lopressor  Note: home meds include Lopressor 25 mg bid  Cont to monitor    Paroxysmal atrial fibrillation (HCC)- (present on admission)  Assessment & Plan  HR recently ok  (8/11): HR was noted to be fast during PT -- got EKG             S/P Lopressor 25 mg x 1 this afternoon (did not get Lopressor 25 mg this am due to slightly lower HR)             gave 12.5 mg Lopressor on night of 8/11 since pt just got 25 mg at 2:30 pm  EKG (8/11): SVT at 157  TFT's: abn --> see other assessment  On Lopressor: 25 mg bid  Note: Lopressor hold parameters adjusted  Cont to monitor    Hypothyroidism- (present on admission)  Assessment & Plan  (8/12/22): TSH: 3.08 ; FT4: 2.08  On Synthroid --> dose decreased recently  Note: pt has an elevated FT4 over the past couple years  Note: suggest repeat TFT's in about 6 weeks

## 2022-08-13 NOTE — THERAPY
"Occupational Therapy  Daily Treatment     Patient Name: Jess Root  Age:  62 y.o., Sex:  female  Medical Record #: [id number]  Today's Date: 8/13/2022     Precautions  Precautions: Fall Risk  Comments: monitor BP and HR         Subjective    Pt supine in bed upon arrival, pleasant and agreeable to OT session.      Pt expressed anxiety about going home too soon as she lives alone and doesn't have transportation from others. Pt asking \"do you think my hand will ever be at 100% again?\"    Pt requesting OT shower but d/t limited time unable to complete this date. Request put into  for tomorrow.     Objective       08/13/22 1401   OT Charge Group   OT Self Care / ADL 1   OT Therapy Activity 1   OT Total Time Spent   OT Individual Total Time Spent (Mins) 30   Functional Level of Assist   Upper Body Dressing Stand by Assist  (to don gown)   Lower Body Dressing Minimal Assist   Fine Motor / Dexterity    Comments  Completed picking up 1 inch blocks and placing into small square area with L hand. Pt utilized lateral pinch for most activityCompleted picking up large peg out of therapist hand with LUE and putting into peg board flat on table. Pt able to complete 5 prior to fatigue.   Neuro-Muscular Treatments   Neuro-Muscular Treatments Vibration   Comments Applied vibration when completing the following activities: squeezing tennis ball with wrist flexion (near medial epicondyle), wrist extension (near lateral epicondyle), squeezing head of vibrator (placed in palm)   Interdisciplinary Plan of Care Collaboration   Patient Position at End of Therapy In Bed;Call Light within Reach;Tray Table within Reach;Phone within Reach     Briefly discussed d/c planning with pt and stated that team will have a meeting early next week to discuss d/c date. Discussed next steps including HH therapy vs outpaitent therapy to continue progress that pt will make here. Pt then very apprehensive about transportation. Discussed overall " neuro recovery timeline and benefit of repetition and varying modalities to increase sensory input and facilitate muscle contractions.    Assessment    Pt tolerated session well with increased anxiety about d/c. Pt very worried about not going home too soon as she doesn't have social support in place to provide transportation or A while at home. Pt agreeable to all edu provided this session. Pt complete neuro re-edu and FM activities as noted above. Pt noted to fatigue quickly. Vibration applied this session to increase muscle contraction and sensory input into arm and hand.    Strengths: Able to follow instructions, Alert and oriented, Good carryover of learning, Independent prior level of function, Manages pain appropriately, Motivated for self care and independence, Pleasant and cooperative, Willingly participates in therapeutic activities  Barriers: Fatigue, Decreased endurance, Hemiparesis, Home accessibility, Impaired activity tolerance, Impaired balance, Impaired functional cognition    Plan    ADLs, IADLs, functional mobility, L UE GM/FMC; Distal L UE strengthening      Occupational Therapy Goals (Active)       Problem: Bathing       Dates: Start: 08/11/22         Goal: STG-Within one week, patient will bathe with Min A overall utilizing DME/AE as needed.       Dates: Start: 08/11/22               Problem: Dressing       Dates: Start: 08/11/22         Goal: STG-Within one week, patient will dress UB with Min A overall utilizing DME/AE as needed.       Dates: Start: 08/11/22            Goal: STG-Within one week, patient will dress LB with Min A overall utilizing DME/AE as needed.       Dates: Start: 08/11/22               Problem: Functional Transfers       Dates: Start: 08/11/22         Goal: STG-Within one week, patient will transfer to toilet with SBA overall utilizing DME/AE as needed.       Dates: Start: 08/11/22               Problem: Grooming       Dates: Start: 08/11/22         Goal: STG-Within one  week, patient will complete grooming with set-up A overall utilizing DME/AE as needed.       Dates: Start: 08/11/22               Problem: OT Long Term Goals       Dates: Start: 08/11/22         Goal: LTG-By discharge, patient will complete basic self care tasks Mod I overall utilizing DME/AE as needed.       Dates: Start: 08/11/22            Goal: LTG-By discharge, patient will perform bathroom transfers with Mod I overall utilizing DME/AE as needed.       Dates: Start: 08/11/22

## 2022-08-13 NOTE — THERAPY
Speech Language Pathology  Daily Treatment     Patient Name: Jess Root  Age:  62 y.o., Sex:  female  Medical Record #: [id number]  Today's Date: 8/13/2022     Precautions  Precautions: Fall Risk  Comments: monitor BP and HR    Subjective    Pt pleasant and cooperative, hyperverbose requiring redirection.      Objective       08/13/22 0803   Treatment Charges   SLP Swallowing Dysfunction Treatment Swallowing Dysfunction Treatment   SLP Total Time Spent   SLP Individual Total Time Spent (Mins) 60       Assessment    Pt assessed with current diet of soft and bite size with thin liquids as well as regular texture trials. Pt tolerating well with no overt s/sx of asp/pen noted. Pt cont to present with low PO intake despite alternative options being presented. Rec advance to regular textures, assess diet tolerance with dc from further swallow intervention as appropriate. Pt likely to remain in tdine for set up assistance of tray due to limited use of right UE.          Plan    Advance to regular textures     Speech Therapy Problems (Active)       Problem: Memory STGs       Dates: Start: 08/12/22         Goal: STG-Within one week, patient will learn and implement functional memory strategies to assist in recall of information related to stroke and daily tasks with 80% accuracy provided MIN A.       Dates: Start: 08/12/22               Problem: Problem Solving STGs       Dates: Start: 08/12/22         Goal: STG-Within one week, patient will complete functional medication management and financial management tasks with 80% accuracy provided SPV.       Dates: Start: 08/12/22               Problem: Speech/Swallowing LTGs       Dates: Start: 08/11/22         Goal: LTG-By discharge, patient will safely swallow regular textures with no overt s/sx of asp/pen noted with 100% accuracy provided MOD I       Dates: Start: 08/11/22            Goal: LTG-By discharge, patient will complete functional problem solving and recall  information with 80% accuracy provided MOD I       Dates: Start: 08/12/22               Problem: Swallowing STGs       Dates: Start: 08/11/22         Goal: STG-Within one week, patient will safely swallow trials of regular textures with no overt s/sx of asp/pen noted provided SPV       Dates: Start: 08/11/22

## 2022-08-14 LAB
ANION GAP SERPL CALC-SCNC: 12 MMOL/L (ref 7–16)
BUN SERPL-MCNC: 15 MG/DL (ref 8–22)
CALCIUM SERPL-MCNC: 8.6 MG/DL (ref 8.5–10.5)
CHLORIDE SERPL-SCNC: 103 MMOL/L (ref 96–112)
CO2 SERPL-SCNC: 23 MMOL/L (ref 20–33)
CREAT SERPL-MCNC: 0.69 MG/DL (ref 0.5–1.4)
ERYTHROCYTE [DISTWIDTH] IN BLOOD BY AUTOMATED COUNT: 41.3 FL (ref 35.9–50)
GFR SERPLBLD CREATININE-BSD FMLA CKD-EPI: 97 ML/MIN/1.73 M 2
GLUCOSE SERPL-MCNC: 90 MG/DL (ref 65–99)
HCT VFR BLD AUTO: 44.1 % (ref 37–47)
HGB BLD-MCNC: 15.3 G/DL (ref 12–16)
MAGNESIUM SERPL-MCNC: 1.9 MG/DL (ref 1.5–2.5)
MCH RBC QN AUTO: 32 PG (ref 27–33)
MCHC RBC AUTO-ENTMCNC: 34.7 G/DL (ref 33.6–35)
MCV RBC AUTO: 92.3 FL (ref 81.4–97.8)
PHOSPHATE SERPL-MCNC: 3.6 MG/DL (ref 2.5–4.5)
PLATELET # BLD AUTO: 362 K/UL (ref 164–446)
PMV BLD AUTO: 10 FL (ref 9–12.9)
POTASSIUM SERPL-SCNC: 3.8 MMOL/L (ref 3.6–5.5)
RBC # BLD AUTO: 4.78 M/UL (ref 4.2–5.4)
SODIUM SERPL-SCNC: 138 MMOL/L (ref 135–145)
WBC # BLD AUTO: 7.4 K/UL (ref 4.8–10.8)

## 2022-08-14 PROCEDURE — A9270 NON-COVERED ITEM OR SERVICE: HCPCS | Performed by: HOSPITALIST

## 2022-08-14 PROCEDURE — 700102 HCHG RX REV CODE 250 W/ 637 OVERRIDE(OP): Performed by: PHYSICAL MEDICINE & REHABILITATION

## 2022-08-14 PROCEDURE — 770010 HCHG ROOM/CARE - REHAB SEMI PRIVAT*

## 2022-08-14 PROCEDURE — 80048 BASIC METABOLIC PNL TOTAL CA: CPT

## 2022-08-14 PROCEDURE — 85027 COMPLETE CBC AUTOMATED: CPT

## 2022-08-14 PROCEDURE — 700102 HCHG RX REV CODE 250 W/ 637 OVERRIDE(OP): Performed by: HOSPITALIST

## 2022-08-14 PROCEDURE — 97112 NEUROMUSCULAR REEDUCATION: CPT

## 2022-08-14 PROCEDURE — 97535 SELF CARE MNGMENT TRAINING: CPT

## 2022-08-14 PROCEDURE — 84100 ASSAY OF PHOSPHORUS: CPT

## 2022-08-14 PROCEDURE — 36415 COLL VENOUS BLD VENIPUNCTURE: CPT

## 2022-08-14 PROCEDURE — A9270 NON-COVERED ITEM OR SERVICE: HCPCS | Performed by: PHYSICAL MEDICINE & REHABILITATION

## 2022-08-14 PROCEDURE — 97130 THER IVNTJ EA ADDL 15 MIN: CPT

## 2022-08-14 PROCEDURE — 83735 ASSAY OF MAGNESIUM: CPT

## 2022-08-14 PROCEDURE — 92526 ORAL FUNCTION THERAPY: CPT

## 2022-08-14 PROCEDURE — 97129 THER IVNTJ 1ST 15 MIN: CPT

## 2022-08-14 PROCEDURE — 99232 SBSQ HOSP IP/OBS MODERATE 35: CPT | Performed by: HOSPITALIST

## 2022-08-14 RX ADMIN — ATORVASTATIN CALCIUM 80 MG: 40 TABLET, FILM COATED ORAL at 20:52

## 2022-08-14 RX ADMIN — OMEPRAZOLE 20 MG: 20 CAPSULE, DELAYED RELEASE ORAL at 08:56

## 2022-08-14 RX ADMIN — RIVAROXABAN 20 MG: 20 TABLET, FILM COATED ORAL at 17:46

## 2022-08-14 RX ADMIN — LEVOTHYROXINE SODIUM 100 MCG: 0.05 TABLET ORAL at 05:44

## 2022-08-14 RX ADMIN — NYSTATIN 500000 UNITS: 100000 SUSPENSION ORAL at 12:25

## 2022-08-14 RX ADMIN — HYDROXYZINE HYDROCHLORIDE 50 MG: 25 TABLET, FILM COATED ORAL at 00:59

## 2022-08-14 RX ADMIN — NYSTATIN 500000 UNITS: 100000 SUSPENSION ORAL at 08:56

## 2022-08-14 RX ADMIN — NYSTATIN 500000 UNITS: 100000 SUSPENSION ORAL at 20:51

## 2022-08-14 RX ADMIN — NYSTATIN 500000 UNITS: 100000 SUSPENSION ORAL at 17:46

## 2022-08-14 RX ADMIN — METOPROLOL TARTRATE 25 MG: 25 TABLET, FILM COATED ORAL at 17:46

## 2022-08-14 RX ADMIN — METOPROLOL TARTRATE 25 MG: 25 TABLET, FILM COATED ORAL at 05:43

## 2022-08-14 ASSESSMENT — ENCOUNTER SYMPTOMS
FEVER: 0
DIARRHEA: 0
COUGH: 0
DIZZINESS: 0
BLURRED VISION: 0
NERVOUS/ANXIOUS: 0

## 2022-08-14 ASSESSMENT — ACTIVITIES OF DAILY LIVING (ADL): BED_CHAIR_WHEELCHAIR_TRANSFER_DESCRIPTION: INCREASED TIME;SUPERVISION FOR SAFETY

## 2022-08-14 ASSESSMENT — GAIT ASSESSMENTS
DISTANCE (FEET): 200
DEVIATION: BRADYKINETIC
GAIT LEVEL OF ASSIST: STANDBY ASSIST

## 2022-08-14 ASSESSMENT — PAIN DESCRIPTION - PAIN TYPE: TYPE: ACUTE PAIN

## 2022-08-14 ASSESSMENT — PATIENT HEALTH QUESTIONNAIRE - PHQ9
SUM OF ALL RESPONSES TO PHQ9 QUESTIONS 1 AND 2: 0
2. FEELING DOWN, DEPRESSED, IRRITABLE, OR HOPELESS: NOT AT ALL
1. LITTLE INTEREST OR PLEASURE IN DOING THINGS: NOT AT ALL

## 2022-08-14 NOTE — PROGRESS NOTES
[facility] Medicine Daily Progress Note    Date of Service  8/14/2022    Chief Complaint:  Hypertension  PAF    Interval History:  No significant events overnight.    Review of Systems  Review of Systems   Constitutional:  Negative for fever.   Eyes:  Negative for blurred vision.   Respiratory:  Negative for cough.    Cardiovascular:  Negative for chest pain.   Gastrointestinal:  Negative for diarrhea.   Musculoskeletal:  Negative for joint pain.   Neurological:  Negative for dizziness.   Psychiatric/Behavioral:  The patient is not nervous/anxious.       Physical Exam  Temp:  [36.3 °C (97.4 °F)-36.9 °C (98.4 °F)] 36.9 °C (98.4 °F)  Pulse:  [56-76] 59  Resp:  [18] 18  BP: (107-134)/(53-81) 107/69  SpO2:  [94 %-96 %] 96 %    Physical Exam  Vitals and nursing note reviewed.   Constitutional:       Appearance: She is not diaphoretic.   HENT:      Mouth/Throat:      Pharynx: No oropharyngeal exudate or posterior oropharyngeal erythema.   Eyes:      Extraocular Movements: Extraocular movements intact.   Neck:      Vascular: No carotid bruit.   Cardiovascular:      Rate and Rhythm: Normal rate. Rhythm irregular.   Pulmonary:      Effort: Pulmonary effort is normal.      Breath sounds: No wheezing or rales.   Abdominal:      General: There is no distension.      Palpations: Abdomen is soft.      Tenderness: There is no abdominal tenderness.   Musculoskeletal:      Right lower leg: No edema.      Left lower leg: No edema.   Skin:     General: Skin is warm and dry.   Neurological:      Mental Status: She is alert and oriented to person, place, and time.   Psychiatric:         Mood and Affect: Mood normal.         Behavior: Behavior normal.       Fluids    Intake/Output Summary (Last 24 hours) at 8/14/2022 1001  Last data filed at 8/13/2022 2100  Gross per 24 hour   Intake 440 ml   Output --   Net 440 ml         Laboratory  Recent Labs     08/14/22  0520   WBC 7.4   RBC 4.78   HEMOGLOBIN 15.3   HEMATOCRIT 44.1   MCV 92.3   MCH  32.0   MCHC 34.7   RDW 41.3   PLATELETCT 362   MPV 10.0       Recent Labs     08/14/22  0520   SODIUM 138   POTASSIUM 3.8   CHLORIDE 103   CO2 23   GLUCOSE 90   BUN 15   CREATININE 0.69   CALCIUM 8.6                     Imaging    Assessment/Plan  * Ischemic stroke (HCC)- (present on admission)  Assessment & Plan  MRI: showed bilateral strokes, largest in right frontal  parietal lobe in the SOPHIA territory, smaller in the left frontal lobe, also in the SOPHIA territory  S/P [facility]PA (at [facility])  On Xarelto  On Lipitor    Erythrocytosis- (present on admission)  Assessment & Plan  Currently resolved  Suspect 2nd to dehydration  Encouraging fluid intake  Note: if persists, would recommend Hematology as an out patient  Monitor    Essential hypertension- (present on admission)  Assessment & Plan  BP ok   Off Lisinopril (last dose 8/11 am)  On Lopressor  Note: home meds include Lopressor 25 mg bid  Cont to monitor    Paroxysmal atrial fibrillation (HCC)- (present on admission)  Assessment & Plan  HR ok  (8/11): HR was noted to be fast during PT -- got EKG             S/P Lopressor 25 mg x 1 this afternoon (did not get Lopressor 25 mg this am due to slightly lower HR)             gave 12.5 mg Lopressor on night of 8/11 since pt just got 25 mg at 2:30 pm             EKG: SVT at 157  TFT's: abn --> see other assessment  On Lopressor: 25 mg bid  Note: Lopressor hold parameters adjusted  Cont to monitor    Hypothyroidism- (present on admission)  Assessment & Plan  (8/12/22): TSH: 3.08 ; FT4: 2.08  On Synthroid --> dose decreased recently  Note: pt has an elevated FT4 over the past couple years  Note: needs repeat TFT's in about 6 weeks

## 2022-08-14 NOTE — CARE PLAN
Problem: Knowledge Deficit - Standard  Goal: Patient and family/care givers will demonstrate understanding of plan of care, disease process/condition, diagnostic tests and medications  Outcome: Progressing     Problem: Neuro Status  Goal: Neuro status will remain stable or improve  Outcome: Progressing   The patient is Stable - Low risk of patient condition declining or worsening    Shift Goals  Clinical Goals: safety  Patient Goals: rest

## 2022-08-14 NOTE — THERAPY
"Physical Therapy   Daily Treatment     Patient Name: Jess Root  Age:  62 y.o., Sex:  female  Medical Record #: [id number]  Today's Date: 8/14/2022     Precautions  Precautions: Fall Risk  Comments: monitor BP and HR    Subjective    Patient pleasant and agreeable to participate. Reports she was coughing all night and had increased anxiety about not getting enough rest, says she is feeling better now.      Objective       08/14/22 0931   PT Charge Group   PT Neuromuscular Re-Education / Balance 2   PT Total Time Spent   PT Individual Total Time Spent (Mins) 30   Gait Functional Level of Assist    Gait Level Of Assist Standby Assist   Assistive Device None   Distance (Feet) 200   # of Times Distance was Traveled 2   Deviation Bradykinetic  (lack of reciprocal arm swing)   Transfer Functional Level of Assist   Bed, Chair, Wheelchair Transfer Standby Assist   Bed Chair Wheelchair Transfer Description Increased time;Supervision for safety   Bed Mobility    Sit to Supine Supervised   Sit to Stand Standby Assist   Neuro-Muscular Treatments   Comments   Obstacle course negotiation including stepping over small bolsters and onto Airex pad (in // bars for safety) 6 x 10' forward and 6 x 10' lateral stepping    Gait training with head turns (vertical and horizontal) and changes in speed upon command 3 sets of 150' (no AD; SBA for safety)    Retro gait 6 x 30' with no AD and SBA (cuing to increase step-length)     Interdisciplinary Plan of Care Collaboration   Patient Position at End of Therapy In Bed;Bed Alarm On;Call Light within Reach;Tray Table within Reach;Phone within Reach       Assessment    Patient motivated to participate throughout treatment. She lacks reciprocal arm swing bilaterally while ambulating but showed some improvement with cuing. She reports feeling \"winded\" and that her endurance has been affected but seems to recover quickly between tasks.     Strengths: Able to follow instructions, Effective " communication skills, Independent prior level of function, Motivated for self care and independence, Pleasant and cooperative, Willingly participates in therapeutic activities, Good carryover of learning, Good insight into deficits/needs    Plan    Standing dynamic balance, gait speed, fall recovery     Passport items to be completed:  Get in/out of bed safely, in/out of a vehicle, safely use mobility device, walk or wheel around home/community, navigate up and down stairs, show how to get up/down from the ground, ensure home is accessible, demonstrate HEP, complete caregiver training    Physical Therapy Problems (Active)       Problem: Balance       Dates: Start: 08/11/22         Goal: STG-Within one week, patient will maintain dynamic standing on foam with reaching tasks       Dates: Start: 08/11/22               Problem: Mobility       Dates: Start: 08/11/22         Goal: STG-Within one week, patient will ambulate community distances with LRAD with supervision assist       Dates: Start: 08/11/22            Goal: STG-Within one week, patient will ambulate up/down flight of stairs with supervision assist       Dates: Start: 08/11/22               Problem: PT-Long Term Goals       Dates: Start: 08/11/22         Goal: LTG-By discharge, patient will tolerate standing x 15 minutes while performing standing dynamic tasks       Dates: Start: 08/11/22            Goal: LTG-By discharge, patient will ambulate up/down flight of stairs independently       Dates: Start: 08/11/22            Goal: LTG-By discharge, patient will transfer in/out of a car independently.       Dates: Start: 08/11/22

## 2022-08-14 NOTE — CARE PLAN
The patient is Stable - Low risk of patient condition declining or worsening    Shift Goals  Clinical Goals: safety  Patient Goals: rest    Problem: Fall Risk - Rehab  Goal: Patient will remain free from falls  Outcome: Progressing  Note: Orly Smith Fall risk Assessment Score: 8    Low fall risk interventions   - Call light within reach   - Yellow  socks   - Belongings within reach   - Bed in the lowest position            Problem: Skin Integrity  Goal: Patient's skin integrity will be maintained or improve  Outcome: Progressing  Note:   Anthony Score: 18    Patient's skin remains intact and free from new or accidental injury this shift; no s/s of infection. RN wound protocol checked. Encouraged hydration and educated about the importance of nutrition to keep skin integrity. Will continue to monitor.

## 2022-08-14 NOTE — THERAPY
"Occupational Therapy  Daily Treatment     Patient Name: Jess Root  Age:  62 y.o., Sex:  female  Medical Record #: [id number]  Today's Date: 8/14/2022     Precautions  Precautions: Fall Risk  Comments: monitor BP and HR         Subjective    \"Wow I feel so tired. I notice I yawn a lot more\"     Objective       08/14/22 1401   OT Charge Group   OT Self Care / ADL 4   OT Total Time Spent   OT Individual Total Time Spent (Mins) 60   Functional Level of Assist   Grooming Minimal Assist  (to comb hair)   Bathing Minimal Assist  (wash R underarm)   Upper Body Dressing Minimal Assist  (pull down shirt on L side)   Lower Body Dressing Minimal Assist  (to doff R sock)   Toileting Minimal Assist  (to pull underwear up)   Toilet Transfers Standby Assist  (with no AD)   Tub / Shower Transfers Contact Guard Assist  (with no AD)   Interdisciplinary Plan of Care Collaboration   Patient Position at End of Therapy In Bed;Bed Alarm On;Call Light within Reach;Tray Table within Reach     Functional mobility bed<>bathroom SBA, no AD.    Assessment    Pt completed shower routine at Min A overall for ADL's and SBA for mobility without AD, shower bench used for safety, GB's were not necessarily needed and used.  Pt's functional performance was impacted by LUE weakness - especially grasp - but pt used L hand to the best of her abilities with all tasks. No LOB or issues with BP demo    Strengths: Able to follow instructions, Alert and oriented, Good carryover of learning, Independent prior level of function, Manages pain appropriately, Motivated for self care and independence, Pleasant and cooperative, Willingly participates in therapeutic activities  Barriers: Fatigue, Decreased endurance, Hemiparesis, Home accessibility, Impaired activity tolerance, Impaired balance, Impaired functional cognition    Plan    LUE neuro re-ed     Occupational Therapy Goals (Active)       Problem: Bathing       Dates: Start: 08/11/22         Goal: " STG-Within one week, patient will bathe with Min A overall utilizing DME/AE as needed.       Dates: Start: 08/11/22               Problem: Dressing       Dates: Start: 08/11/22         Goal: STG-Within one week, patient will dress UB with Min A overall utilizing DME/AE as needed.       Dates: Start: 08/11/22            Goal: STG-Within one week, patient will dress LB with Min A overall utilizing DME/AE as needed.       Dates: Start: 08/11/22               Problem: Functional Transfers       Dates: Start: 08/11/22         Goal: STG-Within one week, patient will transfer to toilet with SBA overall utilizing DME/AE as needed.       Dates: Start: 08/11/22               Problem: Grooming       Dates: Start: 08/11/22         Goal: STG-Within one week, patient will complete grooming with set-up A overall utilizing DME/AE as needed.       Dates: Start: 08/11/22               Problem: OT Long Term Goals       Dates: Start: 08/11/22         Goal: LTG-By discharge, patient will complete basic self care tasks Mod I overall utilizing DME/AE as needed.       Dates: Start: 08/11/22            Goal: LTG-By discharge, patient will perform bathroom transfers with Mod I overall utilizing DME/AE as needed.       Dates: Start: 08/11/22

## 2022-08-14 NOTE — THERAPY
Speech Language Pathology  Daily Treatment     Patient Name: Jess Root  Age:  62 y.o., Sex:  female  Medical Record #: [id number]  Today's Date: 8/14/2022     Precautions  Precautions: Fall Risk  Comments: monitor BP and HR    Subjective    Pt pleasant and cooperative during ST session.      Objective       08/14/22 1101   Treatment Charges   Charges Yes   SLP Swallowing Dysfunction Treatment Swallowing Dysfunction Treatment   SLP Cognitive Skill Development First 15 Minutes 1   SLP Cognitive Skill Development Additional 15 Minutes 1   SLP Total Time Spent   SLP Individual Total Time Spent (Mins) 60   Cognition   Cognitive-Linguistic (WDL) X   Medication Management  Moderate (3)   Sleep/Wake Cycle   Sleep & Rest Awake   Dysphagia    Diet / Liquid Recommendation Regular - Easy to Chew (7);Thin (0)   Interdisciplinary Plan of Care Collaboration   IDT Collaboration with  Certified Nursing Assistant   Patient Position at End of Therapy Chair Alarm On;Seated;Call Light within Reach;Tray Table within Reach;Phone within Reach   Collaboration Comments CLOF       Assessment    Pt completed medication list with  MOD-MIN verbal cues. Pt somewhat verbose which occasionally decreased her attention to task, causing errors. Pt benefiting from education relating to med purposes.    Pt assessed with regular textures and thin liquids. Pt tolerated food and liquids with no overt clinica s/sx of aspiration. Pt benefits from set up/clean up assistance d/t decreased function in L UE. Rec D/C from dysphagia therapy.          Plan    Continue to address memory, medication and financial management.     Passport items to be completed:  Express basic needs, understand food/liquid recommendations, consistently follow swallow precautions, manage finances, manage medications, arrive to therapy appointments on time, complete daily memory log entries, solve problems related to safety situations, review education related to hospitalization,  complete caregiver training     Speech Therapy Problems (Active)       Problem: Memory STGs       Dates: Start: 08/12/22         Goal: STG-Within one week, patient will learn and implement functional memory strategies to assist in recall of information related to stroke and daily tasks with 80% accuracy provided MIN A.       Dates: Start: 08/12/22               Problem: Problem Solving STGs       Dates: Start: 08/12/22         Goal: STG-Within one week, patient will complete functional medication management and financial management tasks with 80% accuracy provided SPV.       Dates: Start: 08/12/22               Problem: Speech/Swallowing LTGs       Dates: Start: 08/11/22         Goal: LTG-By discharge, patient will safely swallow regular textures with no overt s/sx of asp/pen noted with 100% accuracy provided MOD I       Dates: Start: 08/11/22            Goal: LTG-By discharge, patient will complete functional problem solving and recall information with 80% accuracy provided MOD I       Dates: Start: 08/12/22               Problem: Swallowing STGs       Dates: Start: 08/11/22         Goal: STG-Within one week, patient will safely swallow trials of regular textures with no overt s/sx of asp/pen noted provided SPV       Dates: Start: 08/11/22

## 2022-08-15 PROCEDURE — 97530 THERAPEUTIC ACTIVITIES: CPT | Mod: CQ

## 2022-08-15 PROCEDURE — 97112 NEUROMUSCULAR REEDUCATION: CPT | Mod: CQ

## 2022-08-15 PROCEDURE — A9270 NON-COVERED ITEM OR SERVICE: HCPCS | Performed by: PHYSICAL MEDICINE & REHABILITATION

## 2022-08-15 PROCEDURE — 99233 SBSQ HOSP IP/OBS HIGH 50: CPT | Performed by: PHYSICAL MEDICINE & REHABILITATION

## 2022-08-15 PROCEDURE — 700102 HCHG RX REV CODE 250 W/ 637 OVERRIDE(OP): Performed by: HOSPITALIST

## 2022-08-15 PROCEDURE — A9270 NON-COVERED ITEM OR SERVICE: HCPCS | Performed by: HOSPITALIST

## 2022-08-15 PROCEDURE — 97129 THER IVNTJ 1ST 15 MIN: CPT

## 2022-08-15 PROCEDURE — 97535 SELF CARE MNGMENT TRAINING: CPT

## 2022-08-15 PROCEDURE — 97530 THERAPEUTIC ACTIVITIES: CPT

## 2022-08-15 PROCEDURE — 97116 GAIT TRAINING THERAPY: CPT | Mod: CQ

## 2022-08-15 PROCEDURE — 770010 HCHG ROOM/CARE - REHAB SEMI PRIVAT*

## 2022-08-15 PROCEDURE — 700102 HCHG RX REV CODE 250 W/ 637 OVERRIDE(OP): Performed by: PHYSICAL MEDICINE & REHABILITATION

## 2022-08-15 PROCEDURE — 97130 THER IVNTJ EA ADDL 15 MIN: CPT

## 2022-08-15 PROCEDURE — 99232 SBSQ HOSP IP/OBS MODERATE 35: CPT | Performed by: HOSPITALIST

## 2022-08-15 RX ADMIN — METOPROLOL TARTRATE 25 MG: 25 TABLET, FILM COATED ORAL at 05:27

## 2022-08-15 RX ADMIN — RIVAROXABAN 20 MG: 20 TABLET, FILM COATED ORAL at 18:14

## 2022-08-15 RX ADMIN — METOPROLOL TARTRATE 12.5 MG: 25 TABLET, FILM COATED ORAL at 18:14

## 2022-08-15 RX ADMIN — NYSTATIN 500000 UNITS: 100000 SUSPENSION ORAL at 18:13

## 2022-08-15 RX ADMIN — SENNOSIDES AND DOCUSATE SODIUM 2 TABLET: 50; 8.6 TABLET ORAL at 20:33

## 2022-08-15 RX ADMIN — ATORVASTATIN CALCIUM 80 MG: 40 TABLET, FILM COATED ORAL at 20:34

## 2022-08-15 RX ADMIN — OMEPRAZOLE 20 MG: 20 CAPSULE, DELAYED RELEASE ORAL at 08:40

## 2022-08-15 RX ADMIN — NYSTATIN 500000 UNITS: 100000 SUSPENSION ORAL at 20:56

## 2022-08-15 RX ADMIN — NYSTATIN 500000 UNITS: 100000 SUSPENSION ORAL at 12:22

## 2022-08-15 RX ADMIN — NYSTATIN 500000 UNITS: 100000 SUSPENSION ORAL at 08:40

## 2022-08-15 RX ADMIN — LEVOTHYROXINE SODIUM 100 MCG: 0.05 TABLET ORAL at 05:27

## 2022-08-15 ASSESSMENT — PATIENT HEALTH QUESTIONNAIRE - PHQ9
2. FEELING DOWN, DEPRESSED, IRRITABLE, OR HOPELESS: NOT AT ALL
SUM OF ALL RESPONSES TO PHQ9 QUESTIONS 1 AND 2: 0
SUM OF ALL RESPONSES TO PHQ9 QUESTIONS 1 AND 2: 0
1. LITTLE INTEREST OR PLEASURE IN DOING THINGS: NOT AT ALL
2. FEELING DOWN, DEPRESSED, IRRITABLE, OR HOPELESS: NOT AT ALL
1. LITTLE INTEREST OR PLEASURE IN DOING THINGS: NOT AT ALL

## 2022-08-15 ASSESSMENT — GAIT ASSESSMENTS
DEVIATION: BRADYKINETIC
GAIT LEVEL OF ASSIST: SUPERVISED

## 2022-08-15 ASSESSMENT — ENCOUNTER SYMPTOMS
DIARRHEA: 0
CHILLS: 0
NERVOUS/ANXIOUS: 0
VOMITING: 0
SHORTNESS OF BREATH: 0
FEVER: 0
ABDOMINAL PAIN: 0
NAUSEA: 0

## 2022-08-15 ASSESSMENT — ACTIVITIES OF DAILY LIVING (ADL): BED_CHAIR_WHEELCHAIR_TRANSFER_DESCRIPTION: INCREASED TIME;SUPERVISION FOR SAFETY

## 2022-08-15 NOTE — THERAPY
"Occupational Therapy  Daily Treatment     Patient Name: Jess Root  Age:  62 y.o., Sex:  female  Medical Record #: [id number]  Today's Date: 8/15/2022     Precautions  Precautions: Fall Risk  Comments: monitor BP and HR         Subjective    \"I really want to switch rooms, my roommate was up 3x last night trying to get out of bed. I barely got any sleep.\" - charge Nurse notified      Objective       08/15/22 0701   OT Charge Group   OT Self Care / ADL 2   OT Therapy Activity 2   OT Total Time Spent   OT Individual Total Time Spent (Mins) 60   Pain   Intervention Declines   Cognition    Level of Consciousness Alert   Functional Level of Assist   Grooming Standby Assist;Standing   Grooming Description Increased time;Standing at sink;Supervision for safety  (brush teeth and wash hands)   Lower Body Dressing Standby Assist   Lower Body Dressing Description Increased time;Initial preparation for task;Set-up of equipment;Supervision for safety;Verbal cueing  (cues for donning socks at EOB using 1 handed technique)   Interdisciplinary Plan of Care Collaboration   Patient Position at End of Therapy Seated;Chair Alarm On;Self Releasing Lap Belt Applied;Call Light within Reach;Tray Table within Reach;Phone within Reach     Seated at the table pt engaged in active grasp/release:     1\" blocks grasping with L hand and placing onto target on tabletop- pt then engaged in stacking cubes- pt required assist from R hand to keep blocks from falling over.   Grasping large pegs on L side and placing into large peg board- increased time taken to grasp and manipulate peg with L hand- pt successful 25% of the time on first attempt. Pt using gross grasp pattern to put pegs into hole. Pt then engaged in removing 20 large pegs from board using racking grasp pattern. Educated pt on over emphasizing movements when releasing to work on active grasp/release patterns to retrain the brain.     Increased time taken to assist pt in cutting her " nails and filing them down due to pt c/o pain in palm of hand from finger nails digging into them when grasping for objects. Pt was able to assist with cutting L hand using unaffected R hand and placing L hand on rolled up wash clothes. Assist needed to hold each finger in place when pt filing down nail. Assist needed to cut R finger nails due to limited use of L hand at this time.     Assessment    Pt tolerated session fair. Pt reported not getting any sleep last night due to her roommate being up all night. Pt reported swelling in L hand compared to yesterday- retrograde massage given to L hand to push out some of the fluid to increase functional movement. Pt engaged in active grasp/release on tabletop- see above for details. Pt frustrated with her minor set-back from yesterday, educated pt on importance of sleep and how it can negatively effect progress.     Strengths: Able to follow instructions, Alert and oriented, Good carryover of learning, Independent prior level of function, Manages pain appropriately, Motivated for self care and independence, Pleasant and cooperative, Willingly participates in therapeutic activities  Barriers: Fatigue, Decreased endurance, Hemiparesis, Home accessibility, Impaired activity tolerance, Impaired balance, Impaired functional cognition    Plan    ADLs, IADLs, functional mobility, L UE GM/FMC; Distal L UE strengthening    Occupational Therapy Goals (Active)       Problem: Dressing       Dates: Start: 08/15/22         Goal: STG-Within one week, patient will dress UB with setup and supervision       Dates: Start: 08/15/22            Goal: STG-Within one week, patient will dress LB with setup and supervision       Dates: Start: 08/15/22               Problem: Grooming       Dates: Start: 08/11/22         Goal: STG-Within one week, patient will complete grooming with set-up A overall utilizing DME/AE as needed.       Dates: Start: 08/11/22         Goal Note filed on 08/15/22 1134 by  Sheng Brown, OT/VLADIMIR GONZALES                 Problem: OT Long Term Goals       Dates: Start: 08/11/22         Goal: LTG-By discharge, patient will complete basic self care tasks Mod I overall utilizing DME/AE as needed.       Dates: Start: 08/11/22            Goal: LTG-By discharge, patient will perform bathroom transfers with Mod I overall utilizing DME/AE as needed.       Dates: Start: 08/11/22               Problem: Toileting       Dates: Start: 08/15/22         Goal: STG-Within one week, patient will complete toileting tasks with supervision       Dates: Start: 08/15/22

## 2022-08-15 NOTE — THERAPY
Speech Language Pathology  Daily Treatment     Patient Name: Jess Root  Age:  62 y.o., Sex:  female  Medical Record #: [id number]  Today's Date: 8/15/2022     Precautions  Precautions: Fall Risk  Comments: monitor BP and HR    Subjective    Pt pleasant and cooperative, emotional re: last night poor sleep due to roommate. Pt reporting charge RN aware and working on room change. Pt cont to eat breakfast at this time, rec no 8:30 treatments due to additional time needed for PO intake.      Objective       08/15/22 0833   Treatment Charges   SLP Cognitive Skill Development First 15 Minutes 1   SLP Cognitive Skill Development Additional 15 Minutes 3   SLP Total Time Spent   SLP Individual Total Time Spent (Mins) 60       Assessment    Pt presented with who has more - coins, pt expressed concerns re: how long it was taking her to complete the tasks. Pt completed 33/40 calculations indep otherwise required min cues overall for correction and attention to detail. Memory log was introduced at this time, and pt was instructed to fill out once returned to her room.          Plan    Assess implementation of memory log, address attention related to meds and finances.     Speech Therapy Problems (Active)       Problem: Memory STGs       Dates: Start: 08/12/22         Goal: STG-Within one week, patient will learn and implement functional memory strategies to assist in recall of information related to stroke and daily tasks with 80% accuracy provided MIN A.       Dates: Start: 08/12/22               Problem: Problem Solving STGs       Dates: Start: 08/12/22         Goal: STG-Within one week, patient will complete functional medication management and financial management tasks with 80% accuracy provided SPV.       Dates: Start: 08/12/22               Problem: Speech/Swallowing LTGs       Dates: Start: 08/11/22         Goal: LTG-By discharge, patient will safely swallow regular textures with no overt s/sx of asp/pen noted with  100% accuracy provided MOD I       Dates: Start: 08/11/22            Goal: LTG-By discharge, patient will complete functional problem solving and recall information with 80% accuracy provided MOD I       Dates: Start: 08/12/22               Problem: Swallowing STGs       Dates: Start: 08/11/22         Goal: STG-Within one week, patient will safely swallow trials of regular textures with no overt s/sx of asp/pen noted provided SPV       Dates: Start: 08/11/22

## 2022-08-15 NOTE — CARE PLAN
Problem: Problem Solving STGs  Goal: STG-Within one week, patient will complete functional medication management and financial management tasks with 80% accuracy provided SPV.  Outcome: Not Met     Problem: Memory STGs  Goal: STG-Within one week, patient will learn and implement functional memory strategies to assist in recall of information related to stroke and daily tasks with 80% accuracy provided MIN A.  Outcome: Not Met     Problem: Swallowing STGs  Goal: STG-Within one week, patient will safely swallow trials of regular textures with no overt s/sx of asp/pen noted provided SPV  Outcome: Met

## 2022-08-15 NOTE — PROGRESS NOTES
Received bedside shift report from Elenita MENDEZ RN regarding patient and assumed care. Patient awake, calm and stable, currently positioned in bed for comfort and safety; call light within reach. Denies pain or discomfort at this time. Will continue to monitor.

## 2022-08-15 NOTE — CARE PLAN
Problem: Grooming  Goal: STG-Within one week, patient will complete grooming with set-up A overall utilizing DME/AE as needed.  Outcome: Not Met  Note: Min A     Problem: Bathing  Goal: STG-Within one week, patient will bathe with Min A overall utilizing DME/AE as needed.  Outcome: Met  Note: Min A     Problem: Dressing  Goal: STG-Within one week, patient will dress UB with Min A overall utilizing DME/AE as needed.  Outcome: Met  Note: Min A  Goal: STG-Within one week, patient will dress LB with Min A overall utilizing DME/AE as needed.  Outcome: Met  Note: Min A     Problem: Functional Transfers  Goal: STG-Within one week, patient will transfer to toilet with SBA overall utilizing DME/AE as needed.  Outcome: Met  Note: SBA

## 2022-08-15 NOTE — CARE PLAN
Problem: Knowledge Deficit - Standard  Goal: Patient and family/care givers will demonstrate understanding of plan of care, disease process/condition, diagnostic tests and medications  Outcome: Progressing  Note: Pt agrees with plan of care tonight regarding medications and safety.  Will continue to monitor patient.      Problem: Mobility  Goal: Patient's capacity to carry out activities will improve  Outcome: Progressing  Note: Pt is with SBA for transfers.  Has steady gait.  Will continue to monitor patient.     The patient is Stable - Low risk of patient condition declining or worsening    Shift Goals  Clinical Goals: Safety  Patient Goals: Sleep well    Progress made toward(s) clinical / shift goals: progressing

## 2022-08-15 NOTE — PROGRESS NOTES
Daughter who is on call this week 0 rehab Progress Note     Encounter Date: 8/15/2022    CC: Hand pain improved    Interval Events (Subjective)  Vital signs: Intermittent mild bradycardia in the high 50s.  Otherwise stable vital signs.  Bowel: Last bowel movement documented 8/13  Bladder: Voiding volitionally  As needed: Tylenol last given 8/13    Patient seen and examined in her room resting.  She states that she is doing well.  She is getting better function out of her hand.  States that the pain in her knuckles is improved.  Reports that she is voiding fine and having regular bowel movements.  Does not feel constipated.  Denies any other systemic complaints.    14 point ROS reviewed and negative except as stated above.     Objective:  VITAL SIGNS: /72   Pulse 60   Temp 36.6 °C (97.8 °F) (Oral)   Resp 20   Ht 1.524 m (5')   Wt 63.5 kg (139 lb 15.9 oz)   SpO2 96%   BMI 27.34 kg/m²     GEN: No apparent distress, laying in bed resting.  HEENT: Head normocephalic, atraumatic. Sclera nonicteric bilaterally, no ocular discharge appreciated bilaterally.  CV: Extremities warm and well-perfused, no peripheral edema appreciated bilaterally.  PULMONARY: Breathing nonlabored on room air, no respiratory accessory muscle use.  Not requiring supplemental oxygen.  ABD: Soft, nontender.  SKIN: No appreciable skin breakdown on exposed areas of skin.  NEURO: Awake alert.  Conversational.  Logical thought content.  Answers questions appropriately.  Left hemiparesis, moving upper extremity and lower extremity volitionally.  No spasticity appreciated left upper extremity.  3/5  strength left upper extremity.  PSYCH: Mood and affect within normal limits.    Recent Results (from the past 72 hour(s))   Basic Metabolic Panel    Collection Time: 08/14/22  5:20 AM   Result Value Ref Range    Sodium 138 135 - 145 mmol/L    Potassium 3.8 3.6 - 5.5 mmol/L    Chloride 103 96 - 112 mmol/L    Co2 23 20 - 33 mmol/L    Glucose 90 65  - 99 mg/dL    Bun 15 8 - 22 mg/dL    Creatinine 0.69 0.50 - 1.40 mg/dL    Calcium 8.6 8.5 - 10.5 mg/dL    Anion Gap 12.0 7.0 - 16.0   MAGNESIUM    Collection Time: 08/14/22  5:20 AM   Result Value Ref Range    Magnesium 1.9 1.5 - 2.5 mg/dL   PHOSPHORUS    Collection Time: 08/14/22  5:20 AM   Result Value Ref Range    Phosphorus 3.6 2.5 - 4.5 mg/dL   CBC WITHOUT DIFFERENTIAL    Collection Time: 08/14/22  5:20 AM   Result Value Ref Range    WBC 7.4 4.8 - 10.8 K/uL    RBC 4.78 4.20 - 5.40 M/uL    Hemoglobin 15.3 12.0 - 16.0 g/dL    Hematocrit 44.1 37.0 - 47.0 %    MCV 92.3 81.4 - 97.8 fL    MCH 32.0 27.0 - 33.0 pg    MCHC 34.7 33.6 - 35.0 g/dL    RDW 41.3 35.9 - 50.0 fL    Platelet Count 362 164 - 446 K/uL    MPV 10.0 9.0 - 12.9 fL   ESTIMATED GFR    Collection Time: 08/14/22  5:20 AM   Result Value Ref Range    GFR (CKD-EPI) 97 >60 mL/min/1.73 m 2       Current Facility-Administered Medications   Medication Frequency    metoprolol tartrate (LOPRESSOR) tablet 12.5 mg TWICE DAILY    levothyroxine (SYNTHROID) tablet 100 mcg AM ES    hydrOXYzine HCl (ATARAX) tablet 50 mg Q6HRS PRN    melatonin tablet 3 mg HS PRN    Respiratory Therapy Consult Continuous RT    Pharmacy Consult Request ...Pain Management Review 1 Each PHARMACY TO DOSE    acetaminophen (Tylenol) tablet 650 mg Q4HRS PRN    senna-docusate (PERICOLACE or SENOKOT S) 8.6-50 MG per tablet 2 Tablet BID    And    polyethylene glycol/lytes (MIRALAX) PACKET 1 Packet QDAY PRN    And    magnesium hydroxide (MILK OF MAGNESIA) suspension 30 mL QDAY PRN    And    bisacodyl (DULCOLAX) suppository 10 mg QDAY PRN    lactulose 20 GM/30ML solution 30 mL QDAY PRN    docusate sodium (ENEMEEZ) enema 283 mg QDAY PRN    omeprazole (PRILOSEC) capsule 20 mg QAM AC    artificial tears ophthalmic solution 1 Drop PRN    mag hydrox-al hydrox-simeth (MAALOX PLUS ES or MYLANTA DS) suspension 20 mL Q2HRS PRN    ondansetron (ZOFRAN ODT) dispertab 4 mg 4X/DAY PRN    Or    ondansetron (ZOFRAN)  syringe/vial injection 4 mg 4X/DAY PRN    traZODone (DESYREL) tablet 50 mg QHS PRN    sodium chloride (OCEAN) 0.65 % nasal spray 2 [place] PRN    hydrALAZINE (APRESOLINE) tablet 10 mg Q8HRS PRN    rivaroxaban (XARELTO) tablet 20 mg PM MEAL    atorvastatin (LIPITOR) tablet 80 mg Q EVENING    midazolam (VERSED) 5 mg/mL (1 mL vial) PRN    nystatin (MYCOSTATIN) 084257 UNIT/ML suspension 500,000 Units 4X/DAY       Orders Placed This Encounter   Procedures    Diet Order Diet: Regular     Standing Status:   Standing     Number of Occurrences:   1     Order Specific Question:   Diet:     Answer:   Regular [1]       Assessment:  Active Hospital Problems    Diagnosis     *Ischemic stroke (HCC)     Erythrocytosis     Paroxysmal atrial fibrillation (HCC)     Essential hypertension     Hypothyroidism        Medical Decision Making and Plan:  Bilateral strokes  Right frontal parietal  Left frontal  Left hemiparesis, arm >leg  Non-dominant  Cardioembolic  Continue full rehab program  PT/OT/SLP, 1 hr each discipline, 5 days per week     Xarelto  Statin     Outpatient follow-up with [place] [facility]    Emotional lability  8/15 In setting of right frontal stroke  Consider psychology evaluation     Paroxysmal atrial fibrillation  Atrial fibrillation with RVR 8/11  Metoprolol  Xarelto  Checked EKG, RVR with rate 157 8/11 -likely secondary to her morning dose metoprolol held due to mild bradycardia and hypotension  Hospitalist consulted, appreciate assistance    Hypertension  Hypotension 8/11  Metoprolol  Lisinopril, discontinued  Appreciate hospitalist assistance    Hypothyroidism  History of thyroidectomy  Levothyroxine  Per patient dose recently decreased  Checked TSH, normal TSH, elevated free T4  Dose of levothyroxine decreased  Appreciate hospitalist assistance     GI prophylaxis  Omeprazole     Oral thrush  Continue nystatin     Bowel program  Continue bowel medications  Last BM 8/13     Bladder program  Check PVRs -  40  Bladder scan for no voids  ICP for over 400 cc  Scheduled toileting      DVT prophylaxis  Xarelto    Total time: 36  minutes.  I spent greater than 50% of the time for patient care and coordination on this date, including unit/floor time, and face-to-face time with the patient as per assessment and plan above.    Apolonia Gastelum D.O.      IDT Team Meeting 8/15/2022    I, Apolonia Gastelum D.O., was present and led the interdisciplinary team conference on 8/15/2022.  I led the IDT conference and agree with the IDT conference documentation and plan of care as noted below.     RN: Continent of bowel and bladder. Denies pain.   PT: Mobility is supervised without assistive device. CGA with stairs.   OT: Min A for ADLs. Barriers are distal hemiparesis and living independently. Met 4/5 goals this week.   SLP: T dine. Regular diet think liquids. Concerns regarding cognition, basic calculations. Min cues required for attention to detail. Medication management and financial management.   CM: Continues to work on disposition and DME needs.   DC/Disposition:  - [place]: Home alone. 8/22/22

## 2022-08-15 NOTE — THERAPY
Physical Therapy   Daily Treatment     Patient Name: Jess Root  Age:  62 y.o., Sex:  female  Medical Record #: [id number]  Today's Date: 8/15/2022     Precautions  Precautions: Fall Risk  Comments: monitor BP and HR    Subjective    Pt seated in w/c upon arrival, c/o fatigue d/t not sleeping well last night, agreeable to session.     Objective       08/15/22 0931   PT Charge Group   PT Gait Training 1   PT Neuromuscular Re-Education / Balance 2   PT Therapeutic Activities 1   Supervising Physical Therapist Mason Hilliard   PT Total Time Spent   PT Individual Total Time Spent (Mins) 60   Pain   Intervention Declines   Pain 0 - 10 Group   Pain Rating Scale (NPRS) 0   Cognition    Level of Consciousness Alert   Gait Functional Level of Assist    Gait Level Of Assist Supervised   Assistive Device None   Distance (Feet)   (indoor 300+outdoor 100)   Deviation Bradykinetic   Transfer Functional Level of Assist   Bed, Chair, Wheelchair Transfer Supervised   Bed Chair Wheelchair Transfer Description Increased time;Supervision for safety  (w/o AD)   Bed Mobility    Sit to Supine Supervised   Sit to Stand Supervised   Scooting Supervised   Neuro-Muscular Treatments   Neuro-Muscular Treatments Anterior weight shift;Weight Shift Right;Weight Shift Left   Comments see not for detail   Interdisciplinary Plan of Care Collaboration   Patient Position at End of Therapy In Bed;Call Light within Reach;Tray Table within Reach;Phone within Reach     Neuro-rashi-  Ambulation in // bars forward/backward/sidestepping 8x4 w/ SBA.  Stepping over 4 cones forward starting w/ BUEs support and progress to no UEs support 8x4; sidestepping over 4 cones starting w/ BUEs support and progress to no UEs support 8x4.  Static standing on airex pad-  FA, FT 30 secs each w/ SBA, FT w/ eyes close 30 secs w/ SBA and 1 LOB. FT w/ trunk turning to L/R x4.  FT w/ trunk turning and UE reaching cones both side x4 w/ CGA, several LOBs.    Standing by the sink  to brush teeth w/ counter support w/ SPV.    Assessment    Pt tolerated session well, was motivated to participate despite fatigue. Pt reported feeling steady walking w/o AD and showing no signs of LOB during ambulation within session.    Strengths: Able to follow instructions, Effective communication skills, Independent prior level of function, Motivated for self care and independence, Pleasant and cooperative, Willingly participates in therapeutic activities, Good carryover of learning, Good insight into deficits/needs    Plan    Standing dynamic balance, gait speed, fall recovery     Passport items to be completed:  Get in/out of bed safely, in/out of a vehicle, safely use mobility device, walk or wheel around home/community, navigate up and down stairs, show how to get up/down from the ground, ensure home is accessible, demonstrate HEP, complete caregiver training    Physical Therapy Problems (Active)       Problem: Balance       Dates: Start: 08/11/22         Goal: STG-Within one week, patient will maintain dynamic standing on foam with reaching tasks       Dates: Start: 08/11/22               Problem: Mobility       Dates: Start: 08/11/22         Goal: STG-Within one week, patient will ambulate community distances with LRAD with supervision assist       Dates: Start: 08/11/22            Goal: STG-Within one week, patient will ambulate up/down flight of stairs with supervision assist       Dates: Start: 08/11/22               Problem: PT-Long Term Goals       Dates: Start: 08/11/22         Goal: LTG-By discharge, patient will tolerate standing x 15 minutes while performing standing dynamic tasks       Dates: Start: 08/11/22            Goal: LTG-By discharge, patient will ambulate up/down flight of stairs independently       Dates: Start: 08/11/22            Goal: LTG-By discharge, patient will transfer in/out of a car independently.       Dates: Start: 08/11/22

## 2022-08-15 NOTE — DISCHARGE PLANNING
CM met with patient to update on IDT and DC date of 8/22/22.  Answered questions.  Patient likes the idea of [facility].  CM explained there has been a waiting list recently and she may have to start with home health.  She is agreeable to both.  CM available as needs arise.

## 2022-08-15 NOTE — PROGRESS NOTES
[facility] Medicine Daily Progress Note    Date of Service  8/15/2022    Chief Complaint:  Hypertension  PAF    Interval History:  Pt is a little concerned about going home too soon.  Discussed about lowering her Lopressor dose 2nd to bradycardia.    Review of Systems  Review of Systems   Constitutional:  Negative for chills and fever.   Respiratory:  Negative for shortness of breath.    Cardiovascular:  Negative for chest pain.   Gastrointestinal:  Negative for abdominal pain, diarrhea, nausea and vomiting.   Psychiatric/Behavioral:  The patient is not nervous/anxious.       Physical Exam  Temp:  [36.6 °C (97.8 °F)-36.9 °C (98.5 °F)] 36.6 °C (97.8 °F)  Pulse:  [52-65] 60  Resp:  [18-20] 20  BP: (118-138)/(60-74) 129/72  SpO2:  [92 %-97 %] 96 %    Physical Exam  Vitals and nursing note reviewed.   Constitutional:       Appearance: Normal appearance.   HENT:      Head: Atraumatic.   Eyes:      Conjunctiva/sclera: Conjunctivae normal.      Pupils: Pupils are equal, round, and reactive to light.   Cardiovascular:      Rate and Rhythm: Normal rate. Rhythm irregular.      Heart sounds: No murmur heard.  Pulmonary:      Effort: Pulmonary effort is normal.      Breath sounds: No stridor. No wheezing or rales.   Abdominal:      General: There is no distension.      Palpations: Abdomen is soft.      Tenderness: There is no abdominal tenderness.   Musculoskeletal:      Cervical back: Normal range of motion and neck supple.      Right lower leg: No edema.      Left lower leg: No edema.   Skin:     General: Skin is warm and dry.      Findings: No rash.   Neurological:      Mental Status: She is alert and oriented to person, place, and time.   Psychiatric:         Mood and Affect: Mood normal.         Behavior: Behavior normal.       Fluids    Intake/Output Summary (Last 24 hours) at 8/15/2022 0946  Last data filed at 8/15/2022 0900  Gross per 24 hour   Intake 480 ml   Output --   Net 480 ml         Laboratory  Recent Labs      08/14/22  0520   WBC 7.4   RBC 4.78   HEMOGLOBIN 15.3   HEMATOCRIT 44.1   MCV 92.3   MCH 32.0   MCHC 34.7   RDW 41.3   PLATELETCT 362   MPV 10.0       Recent Labs     08/14/22  0520   SODIUM 138   POTASSIUM 3.8   CHLORIDE 103   CO2 23   GLUCOSE 90   BUN 15   CREATININE 0.69   CALCIUM 8.6                     Imaging    Assessment/Plan  * Ischemic stroke (HCC)- (present on admission)  Assessment & Plan  MRI: showed bilateral strokes, largest in right frontal  parietal lobe in the SOPHIA territory, smaller in the left frontal lobe, also in the SOPHIA territory  S[facility]P [facility]PA (at [facility])  On Xarelto  On Lipitor    Erythrocytosis- (present on admission)  Assessment & Plan  Currently resolved  Suspect 2nd to dehydration  Encouraging fluid intake  Note: if persists, would recommend Hematology as an out patient  Monitor    Essential hypertension- (present on admission)  Assessment & Plan  BP ok   Off Lisinopril   On Lopressor --> will decrease dose 2nd to viktor  Note: home meds include Lopressor 25 mg bid  Cont to monitor    Paroxysmal atrial fibrillation (HCC)- (present on admission)  Assessment & Plan  HR trending lower recently: 52-65  (8/11): HR was noted to be fast during PT -- EKG showed SVT at 157             TFT's: abn --> see other assessment  On Lopressor: 25 mg bid --> will decrease to 12.5 mg bid (starting 8/15 night)  Cont to monitor    Hypothyroidism- (present on admission)  Assessment & Plan  (8/12/22): TSH: 3.08 ; FT4: 2.08  On Synthroid --> dose decreased recently  Note: pt has an elevated FT4 over the past couple years  Note: needs repeat TFT's in about 6 weeks

## 2022-08-16 PROCEDURE — 700102 HCHG RX REV CODE 250 W/ 637 OVERRIDE(OP): Performed by: PHYSICAL MEDICINE & REHABILITATION

## 2022-08-16 PROCEDURE — 99232 SBSQ HOSP IP/OBS MODERATE 35: CPT | Performed by: PHYSICAL MEDICINE & REHABILITATION

## 2022-08-16 PROCEDURE — 97129 THER IVNTJ 1ST 15 MIN: CPT

## 2022-08-16 PROCEDURE — A9270 NON-COVERED ITEM OR SERVICE: HCPCS | Performed by: PHYSICAL MEDICINE & REHABILITATION

## 2022-08-16 PROCEDURE — 97530 THERAPEUTIC ACTIVITIES: CPT

## 2022-08-16 PROCEDURE — 97112 NEUROMUSCULAR REEDUCATION: CPT

## 2022-08-16 PROCEDURE — 99232 SBSQ HOSP IP/OBS MODERATE 35: CPT | Performed by: HOSPITALIST

## 2022-08-16 PROCEDURE — 97535 SELF CARE MNGMENT TRAINING: CPT

## 2022-08-16 PROCEDURE — A9270 NON-COVERED ITEM OR SERVICE: HCPCS | Performed by: HOSPITALIST

## 2022-08-16 PROCEDURE — 770010 HCHG ROOM/CARE - REHAB SEMI PRIVAT*

## 2022-08-16 PROCEDURE — 700102 HCHG RX REV CODE 250 W/ 637 OVERRIDE(OP): Performed by: HOSPITALIST

## 2022-08-16 PROCEDURE — 97130 THER IVNTJ EA ADDL 15 MIN: CPT

## 2022-08-16 PROCEDURE — 97110 THERAPEUTIC EXERCISES: CPT

## 2022-08-16 RX ADMIN — METOPROLOL TARTRATE 12.5 MG: 25 TABLET, FILM COATED ORAL at 17:34

## 2022-08-16 RX ADMIN — RIVAROXABAN 20 MG: 20 TABLET, FILM COATED ORAL at 17:34

## 2022-08-16 RX ADMIN — METOPROLOL TARTRATE 12.5 MG: 25 TABLET, FILM COATED ORAL at 05:22

## 2022-08-16 RX ADMIN — LEVOTHYROXINE SODIUM 100 MCG: 0.05 TABLET ORAL at 05:21

## 2022-08-16 RX ADMIN — NYSTATIN 500000 UNITS: 100000 SUSPENSION ORAL at 08:26

## 2022-08-16 RX ADMIN — ATORVASTATIN CALCIUM 80 MG: 40 TABLET, FILM COATED ORAL at 21:20

## 2022-08-16 RX ADMIN — OMEPRAZOLE 20 MG: 20 CAPSULE, DELAYED RELEASE ORAL at 08:26

## 2022-08-16 RX ADMIN — SENNOSIDES AND DOCUSATE SODIUM 2 TABLET: 50; 8.6 TABLET ORAL at 21:20

## 2022-08-16 RX ADMIN — NYSTATIN 500000 UNITS: 100000 SUSPENSION ORAL at 13:05

## 2022-08-16 RX ADMIN — NYSTATIN 500000 UNITS: 100000 SUSPENSION ORAL at 21:21

## 2022-08-16 ASSESSMENT — ENCOUNTER SYMPTOMS
DIZZINESS: 0
FOCAL WEAKNESS: 1
SORE THROAT: 0
SENSORY CHANGE: 1
NERVOUS/ANXIOUS: 0
NAUSEA: 0
PALPITATIONS: 0
ABDOMINAL PAIN: 0
SHORTNESS OF BREATH: 0
FEVER: 0

## 2022-08-16 ASSESSMENT — GAIT ASSESSMENTS
DEVIATION: BRADYKINETIC
DISTANCE (FEET): 175
GAIT LEVEL OF ASSIST: SUPERVISED

## 2022-08-16 ASSESSMENT — PATIENT HEALTH QUESTIONNAIRE - PHQ9
SUM OF ALL RESPONSES TO PHQ9 QUESTIONS 1 AND 2: 0
1. LITTLE INTEREST OR PLEASURE IN DOING THINGS: NOT AT ALL
2. FEELING DOWN, DEPRESSED, IRRITABLE, OR HOPELESS: NOT AT ALL

## 2022-08-16 ASSESSMENT — ACTIVITIES OF DAILY LIVING (ADL): BED_CHAIR_WHEELCHAIR_TRANSFER_DESCRIPTION: SUPERVISION FOR SAFETY

## 2022-08-16 NOTE — THERAPY
"Speech Language Pathology  Daily Treatment     Patient Name: Jess Root  Age:  62 y.o., Sex:  female  Medical Record #: [id number]  Today's Date: 8/16/2022     Precautions  Precautions: Fall Risk  Comments: monitor BP and HR    Subjective    Pt pleasant and cooperative.      Objective       08/16/22 0733   Treatment Charges   SLP Cognitive Skill Development First 15 Minutes 1   SLP Cognitive Skill Development Additional 15 Minutes 5   SLP Total Time Spent   SLP Individual Total Time Spent (Mins) 90       Assessment    Pt completed who has more (bills and coins), pt correctly completed 32/40 calculations indep with additional time for processing and completion (approx 45 min). Pt stating \"well that would have gotten me written up at work for sure\" - pt works as a  at grocery store. Attempted to review and correct however pt stating \"my mind is just exhausted\" plan to review and correct in future ST session. Medications reviewed with written medication list formulated, pt with good awareness of meds overall, min cues for new meds (stool softener and anxiety med). Rec completion of functional medication sort (meds made in LC office)     Strengths: Able to follow instructions, Pleasant and cooperative, Willingly participates in therapeutic activities, Motivated for self care and independence, Independent prior level of function  Barriers: Impaired functional cognition    Plan    Complete functional medication sort, review who has more task in chart       Speech Therapy Problems (Active)       Problem: Memory STGs       Dates: Start: 08/12/22         Goal: STG-Within one week, patient will learn and implement functional memory strategies to assist in recall of information related to stroke and daily tasks with 80% accuracy provided MIN A.       Dates: Start: 08/12/22               Problem: Problem Solving STGs       Dates: Start: 08/12/22         Goal: STG-Within one week, patient will complete functional " medication management and financial management tasks with 80% accuracy provided SPV.       Dates: Start: 08/12/22               Problem: Speech/Swallowing LTGs       Dates: Start: 08/11/22         Goal: LTG-By discharge, patient will safely swallow regular textures with no overt s/sx of asp/pen noted with 100% accuracy provided MOD I       Dates: Start: 08/11/22            Goal: LTG-By discharge, patient will complete functional problem solving and recall information with 80% accuracy provided MOD I       Dates: Start: 08/12/22               Problem: Swallowing STGs       Dates: Start: 08/11/22

## 2022-08-16 NOTE — CARE PLAN
Problem: Neuro Status  Goal: Neuro status will remain stable or improve  Outcome: Progressing  Note: Alert oriented x 4, left hand able to move , pt stated her hand mobility is improving , cont monitored.     Problem: Fall Risk - Rehab  Goal: Patient will remain free from falls  Outcome: Progressing  Note: Orly Smith Fall risk Assessment Score: 8    Low fall risk interventions   - Call light within reach   - Yellow  socks   - Belongings within reach   - Bed in the lowest position          The patient is Stable - Low risk of patient condition declining or worsening    Shift Goals  Clinical Goals: Safety  Patient Goals: Sleep well    Progress made toward(s) clinical / shift goals:  Pt free from fall and injury.

## 2022-08-16 NOTE — PROGRESS NOTES
[facility] Medicine Daily Progress Note    Date of Service  8/16/2022    Chief Complaint:  Hypertension  PAF    History of Present Illness:  Jess is a 62-year-old female with hypertension, paroxysmal atrial fibrillation and hypothyroid secondary to thyroidectomy.  On 8/2 she had an acute onset of left hand weakness and an MRI showed bilateral strokes largest in the right frontal parietal lobe and a smaller in the left frontal lobe.  Source was likely her atrial fibrillation.  Patient was switched from aspirin to Xarelto.  She was admitted 8/10/2022 to [facility] for further physical rehabilitation.    Interval History:  8/16/2022 per vitals the patient is afebrile.  She has heart rates ranging from 55-70 overnight.  Oxygen saturations on room air are stable and satisfactory.  Blood pressure mildly elevated with a reading of 140/70.  Patient is alert speech clear.  Has some ongoing left-sided and weakness and deficit in fine motor skills.  States some left-sided numbness on her face.    Review of Systems  Review of Systems   Constitutional:  Negative for fever.   HENT:  Negative for sore throat.    Respiratory:  Negative for shortness of breath.    Cardiovascular:  Negative for palpitations and leg swelling.   Gastrointestinal:  Negative for abdominal pain and nausea.   Neurological:  Positive for sensory change and focal weakness. Negative for dizziness.   Psychiatric/Behavioral:  The patient is not nervous/anxious.       Physical Exam  Temp:  [36.6 °C (97.8 °F)-37.2 °C (98.9 °F)] 36.6 °C (97.8 °F)  Pulse:  [55-70] 55  Resp:  [18-20] 18  BP: (122-140)/(61-70) 140/70  SpO2:  [92 %-95 %] 92 %    Physical Exam  Vitals and nursing note reviewed.   Constitutional:       Appearance: Normal appearance.   HENT:      Head: Atraumatic.   Eyes:      Extraocular Movements: Extraocular movements intact.      Conjunctiva/sclera: Conjunctivae normal.   Cardiovascular:      Rate and Rhythm: Normal rate. Rhythm irregular.      Pulses:            Radial pulses are 2+ on the right side and 2+ on the left side.      Heart sounds: No murmur heard.  Pulmonary:      Effort: Pulmonary effort is normal.      Breath sounds: No stridor. No wheezing or rales.   Abdominal:      General: There is no distension.      Palpations: Abdomen is soft.      Tenderness: There is no abdominal tenderness.   Musculoskeletal:      Cervical back: Normal range of motion and neck supple.      Right lower leg: No edema.      Left lower leg: No edema.   Lymphadenopathy:      Cervical: No cervical adenopathy.   Skin:     General: Skin is warm and dry.      Findings: No rash.   Neurological:      Mental Status: She is alert and oriented to person, place, and time.      Sensory: Sensory deficit present.      Motor: Weakness present.   Psychiatric:         Mood and Affect: Mood normal.         Behavior: Behavior normal.       Fluids    Intake/Output Summary (Last 24 hours) at 8/16/2022 1339  Last data filed at 8/16/2022 1200  Gross per 24 hour   Intake 230 ml   Output --   Net 230 ml       Laboratory  Recent Labs     08/14/22  0520   WBC 7.4   RBC 4.78   HEMOGLOBIN 15.3   HEMATOCRIT 44.1   MCV 92.3   MCH 32.0   MCHC 34.7   RDW 41.3   PLATELETCT 362   MPV 10.0     Recent Labs     08/14/22  0520   SODIUM 138   POTASSIUM 3.8   CHLORIDE 103   CO2 23   GLUCOSE 90   BUN 15   CREATININE 0.69   CALCIUM 8.6                   Imaging    Assessment/Plan  * Ischemic stroke (HCC)- (present on admission)  Assessment & Plan  MRI: showed bilateral strokes, largest in right frontal  parietal lobe in the SOPHIA territory, smaller in the left frontal lobe, also in the SOPHIA territory  [facility]P [facility]PA (at [facility])  On Xarelto due to etiology likely A. fib  Physical rehabilitation ongoing  Lipitor and metoprolol    Erythrocytosis- (present on admission)  Assessment & Plan  Improved  8/14 Hgb: 15.3  Suspect 2nd to dehydration  Encouraging fluid intake  Note: if persists, would recommend Hematology as an out  patient  Monitor    Essential hypertension- (present on admission)  Assessment & Plan  Monitor vitals  Metoprolol 12.5 mg twice a day with parameters  Note: home meds include Lopressor 25 mg bid  Cont to monitor    Paroxysmal atrial fibrillation (HCC)- (present on admission)  Assessment & Plan  HR trending lower recently: 52-65  (8/11): HR was noted to be fast during PT -- EKG showed SVT at 157             TFT's: abn --> see other assessment  On Lopressor: 25 mg bid --> will decrease to 12.5 mg bid (starting 8/15 night)  Rivaroxaban 20 mg every evening  Cont to monitor    Hypothyroidism- (present on admission)  Assessment & Plan  (8/12/22): TSH: 3.08 ; FT4: 2.08  On Synthroid --> dose decreased recently currently on 100 mcg previously on 112 mcg  Note: pt has an elevated FT4 over the past couple years  Note: needs repeat TFT's in about 6 weeks

## 2022-08-16 NOTE — CARE PLAN
Problem: Mobility  Goal: Patient's capacity to carry out activities will improve  Note: Patient reports improved control and movement of her left hand/arm.      Problem: Fall Risk - Rehab  Goal: Patient will remain free from falls  Note: Patient remains free from falls this shift. Patient was educated on using the call light to prevent falls. Patients bed is in the lowest position. The patients belongings are placed in near proximity to the patient.     The patient is Stable - Low risk of patient condition declining or worsening

## 2022-08-16 NOTE — THERAPY
"Occupational Therapy  Daily Treatment     Patient Name: Jess Root  Age:  62 y.o., Sex:  female  Medical Record #: [id number]  Today's Date: 8/16/2022     Precautions  Precautions: (P) Fall Risk  Comments: monitor BP and HR         Subjective    Patient seated in w/c in room and ready for OT.       Objective       08/16/22 1001   OT Charge Group   OT Self Care / ADL 2   OT Therapy Activity 2   OT Total Time Spent   OT Individual Total Time Spent (Mins) 60   Precautions   Precautions Fall Risk   Functional Level of Assist   Grooming Supervision;Standing   Grooming Description   (to brush teeth)   Lower Body Dressing Supervision   Lower Body Dressing Description Set-up of equipment;Supervision for safety;Increased time  (to don/doff socks and shoes w/ laces, increased time to tie laces)   Bed, Chair, Wheelchair Transfer Supervised   Bed Chair Wheelchair Transfer Description Supervision for safety  (no AD)   IADL Treatments   IADL Treatments Home management   Home Management While standing at closet, patient removed clothing from duffel bag and either folded and put in drawers or put on hangers and then on rail with supervision   Bed Mobility    Sit to Supine Independent   Interdisciplinary Plan of Care Collaboration   IDT Collaboration with  Physician   Patient Position at End of Therapy In Bed;Call Light within Reach;Phone within Reach;Tray Table within Reach   Collaboration Comments Dr Gastelum talked with patient about medications during session     Able to use bilateral UE to open toothbrush and toothpaste boxes.      Functional mobility without AD supervised from room <> main gym.    Completed twelve 6\" steps with two rails and SBA with verbal cues for sequencing LEs.    Assessment    Patient very excited to be able to don and tie her shoes and to complete home management activity of hanging/folding clothes and putting in closet.    Strengths: Able to follow instructions, Alert and oriented, Good carryover of " learning, Independent prior level of function, Manages pain appropriately, Motivated for self care and independence, Pleasant and cooperative, Willingly participates in therapeutic activities  Barriers: Fatigue, Decreased endurance, Hemiparesis, Home accessibility, Impaired activity tolerance, Impaired balance, Impaired functional cognition    Plan    ADLs, IADLs (meal prep/laundry), functional mobility, L UE GM/FMC; Distal L UE strengthening, simulated grocery store  activity    Occupational Therapy Goals (Active)       Problem: Dressing       Dates: Start: 08/15/22         Goal: STG-Within one week, patient will dress UB with setup and supervision       Dates: Start: 08/15/22            Goal: STG-Within one week, patient will dress LB with setup and supervision       Dates: Start: 08/15/22               Problem: Grooming       Dates: Start: 08/11/22         Goal: STG-Within one week, patient will complete grooming with set-up A overall utilizing DME/AE as needed.       Dates: Start: 08/11/22         Goal Note filed on 08/15/22 1134 by Sheng Brown OT/VLADIMIR Farmer A                 Problem: OT Long Term Goals       Dates: Start: 08/11/22         Goal: LTG-By discharge, patient will complete basic self care tasks Mod I overall utilizing DME/AE as needed.       Dates: Start: 08/11/22            Goal: LTG-By discharge, patient will perform bathroom transfers with Mod I overall utilizing DME/AE as needed.       Dates: Start: 08/11/22               Problem: Toileting       Dates: Start: 08/15/22         Goal: STG-Within one week, patient will complete toileting tasks with supervision       Dates: Start: 08/15/22

## 2022-08-16 NOTE — PROGRESS NOTES
Rehab Progress Note     Encounter Date: 8/16/2022     CC: Questions regarding her medication management    Interval Events (Subjective)  Vital signs stable: Continues to have intermittent very mild bradycardia in the 50s and had 1 elevated BP at 140/70 this AM.  Bowel movement: 8/16  Voiding volitionally  PRN Rx: None in 24 hrs    Patient seen and examined in her room.  She is wondering if atrial fibrillation could have contributed to her stroke.  She is actively working with occupational therapy and has tied her shoes independently bilaterally.  Answer questions regarding medications and atrial fibrillation.  Patient has no other systemic complaints.  Discussed blood pressure which was slightly elevated today.    14 point ROS reviewed and negative except as stated above.     Objective:  VITAL SIGNS: BP (!) 140/70   Pulse (!) 55   Temp 36.6 °C (97.8 °F) (Temporal)   Resp 18   Ht 1.524 m (5')   Wt 63.5 kg (139 lb 15.9 oz)   SpO2 92%   BMI 27.34 kg/m²     GEN: No apparent distress, Sitting in chair next to her bed tying shoes.  HEENT: Head normocephalic, atraumatic.  Sclera nonicteric bilaterally, no ocular discharge appreciated bilaterally.  CV: Extremities warm and well-perfused, no peripheral edema appreciated bilaterally.  PULMONARY: Breathing nonlabored on room air, no respiratory accessory muscle use.  Not requiring supplemental oxygen.  ABD: Soft, nontender.  SKIN: No appreciable skin breakdown on exposed areas of skin.  PSYCH: Mood and affect within normal limits.  NEURO: Awake alert.  Conversational.  Logical thought content.  Answers questions appropriately.  Left hemiparesis but is ambulatory, right upper extremity weaker than left lower extremity.  Dexterity improving.      Recent Results (from the past 72 hour(s))   Basic Metabolic Panel    Collection Time: 08/14/22  5:20 AM   Result Value Ref Range    Sodium 138 135 - 145 mmol/L    Potassium 3.8 3.6 - 5.5 mmol/L    Chloride 103 96 - 112 mmol/L     Co2 23 20 - 33 mmol/L    Glucose 90 65 - 99 mg/dL    Bun 15 8 - 22 mg/dL    Creatinine 0.69 0.50 - 1.40 mg/dL    Calcium 8.6 8.5 - 10.5 mg/dL    Anion Gap 12.0 7.0 - 16.0   MAGNESIUM    Collection Time: 08/14/22  5:20 AM   Result Value Ref Range    Magnesium 1.9 1.5 - 2.5 mg/dL   PHOSPHORUS    Collection Time: 08/14/22  5:20 AM   Result Value Ref Range    Phosphorus 3.6 2.5 - 4.5 mg/dL   CBC WITHOUT DIFFERENTIAL    Collection Time: 08/14/22  5:20 AM   Result Value Ref Range    WBC 7.4 4.8 - 10.8 K/uL    RBC 4.78 4.20 - 5.40 M/uL    Hemoglobin 15.3 12.0 - 16.0 g/dL    Hematocrit 44.1 37.0 - 47.0 %    MCV 92.3 81.4 - 97.8 fL    MCH 32.0 27.0 - 33.0 pg    MCHC 34.7 33.6 - 35.0 g/dL    RDW 41.3 35.9 - 50.0 fL    Platelet Count 362 164 - 446 K/uL    MPV 10.0 9.0 - 12.9 fL   ESTIMATED GFR    Collection Time: 08/14/22  5:20 AM   Result Value Ref Range    GFR (CKD-EPI) 97 >60 mL/min/1.73 m 2       Current Facility-Administered Medications   Medication Frequency    metoprolol tartrate (LOPRESSOR) tablet 12.5 mg TWICE DAILY    levothyroxine (SYNTHROID) tablet 100 mcg AM ES    hydrOXYzine HCl (ATARAX) tablet 50 mg Q6HRS PRN    melatonin tablet 3 mg HS PRN    Respiratory Therapy Consult Continuous RT    Pharmacy Consult Request ...Pain Management Review 1 Each PHARMACY TO DOSE    acetaminophen (Tylenol) tablet 650 mg Q4HRS PRN    senna-docusate (PERICOLACE or SENOKOT S) 8.6-50 MG per tablet 2 Tablet BID    And    polyethylene glycol/lytes (MIRALAX) PACKET 1 Packet QDAY PRN    And    magnesium hydroxide (MILK OF MAGNESIA) suspension 30 mL QDAY PRN    And    bisacodyl (DULCOLAX) suppository 10 mg QDAY PRN    lactulose 20 GM/30ML solution 30 mL QDAY PRN    docusate sodium (ENEMEEZ) enema 283 mg QDAY PRN    omeprazole (PRILOSEC) capsule 20 mg QAM AC    artificial tears ophthalmic solution 1 Drop PRN    mag hydrox-al hydrox-simeth (MAALOX PLUS ES or MYLANTA DS) suspension 20 mL Q2HRS PRN    ondansetron (ZOFRAN ODT) dispertab 4 mg  4X/DAY PRN    Or    ondansetron (ZOFRAN) syringe/vial injection 4 mg 4X/DAY PRN    traZODone (DESYREL) tablet 50 mg QHS PRN    sodium chloride (OCEAN) 0.65 % nasal spray 2 [place] PRN    hydrALAZINE (APRESOLINE) tablet 10 mg Q8HRS PRN    rivaroxaban (XARELTO) tablet 20 mg PM MEAL    atorvastatin (LIPITOR) tablet 80 mg Q EVENING    midazolam (VERSED) 5 mg/mL (1 mL vial) PRN    nystatin (MYCOSTATIN) 247049 UNIT/ML suspension 500,000 Units 4X/DAY       Orders Placed This Encounter   Procedures    Diet Order Diet: Regular     Standing Status:   Standing     Number of Occurrences:   1     Order Specific Question:   Diet:     Answer:   Regular [1]       Assessment:  Active Hospital Problems    Diagnosis     *Ischemic stroke (HCC)     Erythrocytosis     Paroxysmal atrial fibrillation (HCC)     Essential hypertension     Hypothyroidism        Medical Decision Making and Plan:  Bilateral strokes  Right frontal parietal  Left frontal  Left hemiparesis, arm >leg  Non-dominant  Cardioembolic  Continue full rehab program  PT/OT/SLP, 1 hr each discipline, 5 days per week     Xarelto  Statin     Outpatient follow-up with [facility]    Emotional lability  8/15 In setting of right frontal stroke  Consider psychology evaluation     Paroxysmal atrial fibrillation  Atrial fibrillation with RVR 8/11  Metoprolol  Xarelto  Checked EKG, RVR with rate 157 8/11 -likely secondary to her morning dose metoprolol held due to mild bradycardia and hypotension  8/16: 1 episode more elevated blood pressure at systolic 140 compared to prior after reduction in metoprolol from 25 mg twice daily to 12.5 mg twice daily on 8/15.    Hypertension  Hypotension 8/11  Metoprolol  Lisinopril, discontinued  8/16: 1x /70.  Monitor.  Metoprolol recently reduced.    Hypothyroidism  History of thyroidectomy  Levothyroxine  Per patient dose recently decreased  Checked TSH, normal TSH, elevated free T4  Dose of levothyroxine decreased  Appreciate  hospitalist assistance     GI prophylaxis  Omeprazole     Oral thrush  Continue nystatin     Bowel program  Continue bowel medications  Last BM 8/16     Bladder program  Check PVRs - 40  Bladder scan for no voids  ICP for over 400 cc  Scheduled toileting      DVT prophylaxis  Xarelto    Total time: 27 minutes.  I spent greater than 50% of the time for patient care and coordination on this date, including unit/floor time, and face-to-face time with the patient as per assessment and plan above.    Apolonia Gastelum D.O.

## 2022-08-16 NOTE — DISCHARGE PLANNING
CM LM again for patient manager at work inquiring about how to get patients FMLA p-work going.  CM awaiting response.

## 2022-08-16 NOTE — THERAPY
Physical Therapy   Daily Treatment     Patient Name: Jess Root  Age:  62 y.o., Sex:  female  Medical Record #: [id number]  Today's Date: 8/16/2022     Precautions  Precautions: Fall Risk  Comments: monitor BP and HR    Subjective    Patient is agreeable to participate, is intermittently tearful today. Patient was offered a hug, which she accepted and helped with the tearfulness.     Objective       08/16/22 1431   PT Charge Group   PT Therapeutic Exercise 1   PT Neuromuscular Re-Education / Balance 3   PT Total Time Spent   PT Individual Total Time Spent (Mins) 60   Gait Functional Level of Assist    Gait Level Of Assist Supervised   Assistive Device None   Distance (Feet) 175   # of Times Distance was Traveled 2   Deviation Bradykinetic  (improved trunk rotation/ arm swing today)   Standing Lower Body Exercises   Standing Lower Body Exercises Yes   Hamstring Curl 1 set of 10;Bilateral    Hip Abduction 1 set of 10;Bilateral   Heel Rise 2 sets of 10;Bilateral   Neuro-Muscular Treatments   Neuro-Muscular Treatments Anterior weight shift;Compensatory Strategies;Postural Changes;Tactile Cuing;Verbal Cuing;Weight Shift Right;Weight Shift Left   Comments feet apart/ feet together/ feet tandem on firm ground and foam in parallel bars, picking up cone while standing on firm ground and foam with L and R hand. Tandem standing with AP weight shifts with each foot leading x 30 sec x 2. contact guard for safety   Interdisciplinary Plan of Care Collaboration   Patient Position at End of Therapy In Bed;Call Light within Reach;Tray Table within Reach;Phone within Reach       Assessment    Patient demonstrates improvement with activity tolerance, balance, and confidence with general movement, is making good gains. She is anxious about fall recovery, is willing to work on that tomorrow. She fatigues with standing strengthening exercises, will benefit from further conditioning for strength.     Strengths: Able to follow  instructions, Effective communication skills, Independent prior level of function, Motivated for self care and independence, Pleasant and cooperative, Willingly participates in therapeutic activities, Good carryover of learning, Good insight into deficits/needs    Plan    Fall recovery, dynamic balance with head turns, BLE strengthening    Passport items to be completed:  Get in/out of bed safely, in/out of a vehicle, safely use mobility device, walk or wheel around home/community, navigate up and down stairs, show how to get up/down from the ground, ensure home is accessible, demonstrate HEP, complete caregiver training    Physical Therapy Problems (Active)       Problem: Balance       Dates: Start: 08/11/22         Goal: STG-Within one week, patient will maintain dynamic standing on foam with reaching tasks       Dates: Start: 08/11/22         Goal Note filed on 08/15/22 1239 by Julius Hilliard, PT       FT w/ trunk turning and UE reaching cones both side x4 w/ CGA, several LOBs.                 Problem: Mobility       Dates: Start: 08/11/22         Goal: STG-Within one week, patient will ambulate up/down flight of stairs with supervision assist       Dates: Start: 08/11/22         Goal Note filed on 08/15/22 1239 by Julius Hilliard, PT       CGA for 14 stairs at this time              Goal: STG-Within one week, patient will ambulate 300 feet at IND level without an AD.       Dates: Start: 08/15/22               Problem: Mobility Transfers       Dates: Start: 08/15/22         Goal: STG-Within one week, patient will transfer bed to chair at IND level without an AD.       Dates: Start: 08/15/22               Problem: PT-Long Term Goals       Dates: Start: 08/11/22         Goal: LTG-By discharge, patient will tolerate standing x 15 minutes while performing standing dynamic tasks       Dates: Start: 08/11/22            Goal: LTG-By discharge, patient will ambulate up/down flight of stairs independently        Dates: Start: 08/11/22            Goal: LTG-By discharge, patient will transfer in/out of a car independently.       Dates: Start: 08/11/22

## 2022-08-17 PROCEDURE — 99231 SBSQ HOSP IP/OBS SF/LOW 25: CPT | Performed by: PHYSICAL MEDICINE & REHABILITATION

## 2022-08-17 PROCEDURE — 97535 SELF CARE MNGMENT TRAINING: CPT

## 2022-08-17 PROCEDURE — 770010 HCHG ROOM/CARE - REHAB SEMI PRIVAT*

## 2022-08-17 PROCEDURE — A9270 NON-COVERED ITEM OR SERVICE: HCPCS | Performed by: PHYSICAL MEDICINE & REHABILITATION

## 2022-08-17 PROCEDURE — 97129 THER IVNTJ 1ST 15 MIN: CPT

## 2022-08-17 PROCEDURE — 90832 PSYTX W PT 30 MINUTES: CPT | Performed by: PSYCHOLOGIST

## 2022-08-17 PROCEDURE — 99232 SBSQ HOSP IP/OBS MODERATE 35: CPT | Performed by: HOSPITALIST

## 2022-08-17 PROCEDURE — A9270 NON-COVERED ITEM OR SERVICE: HCPCS | Performed by: HOSPITALIST

## 2022-08-17 PROCEDURE — 700102 HCHG RX REV CODE 250 W/ 637 OVERRIDE(OP): Performed by: PHYSICAL MEDICINE & REHABILITATION

## 2022-08-17 PROCEDURE — 97530 THERAPEUTIC ACTIVITIES: CPT

## 2022-08-17 PROCEDURE — 700102 HCHG RX REV CODE 250 W/ 637 OVERRIDE(OP): Performed by: HOSPITALIST

## 2022-08-17 PROCEDURE — 97130 THER IVNTJ EA ADDL 15 MIN: CPT

## 2022-08-17 RX ADMIN — SENNOSIDES AND DOCUSATE SODIUM 2 TABLET: 50; 8.6 TABLET ORAL at 08:19

## 2022-08-17 RX ADMIN — METOPROLOL TARTRATE 12.5 MG: 25 TABLET, FILM COATED ORAL at 17:26

## 2022-08-17 RX ADMIN — NYSTATIN 500000 UNITS: 100000 SUSPENSION ORAL at 08:20

## 2022-08-17 RX ADMIN — RIVAROXABAN 20 MG: 20 TABLET, FILM COATED ORAL at 17:26

## 2022-08-17 RX ADMIN — METOPROLOL TARTRATE 12.5 MG: 25 TABLET, FILM COATED ORAL at 05:52

## 2022-08-17 RX ADMIN — LEVOTHYROXINE SODIUM 100 MCG: 0.05 TABLET ORAL at 05:53

## 2022-08-17 RX ADMIN — ACETAMINOPHEN 650 MG: 325 TABLET ORAL at 23:55

## 2022-08-17 RX ADMIN — NYSTATIN 500000 UNITS: 100000 SUSPENSION ORAL at 15:04

## 2022-08-17 RX ADMIN — OMEPRAZOLE 20 MG: 20 CAPSULE, DELAYED RELEASE ORAL at 08:19

## 2022-08-17 RX ADMIN — ATORVASTATIN CALCIUM 80 MG: 40 TABLET, FILM COATED ORAL at 20:53

## 2022-08-17 ASSESSMENT — ENCOUNTER SYMPTOMS
LOSS OF CONSCIOUSNESS: 0
SHORTNESS OF BREATH: 0
FEVER: 0
CHILLS: 0
DIZZINESS: 1
HEADACHES: 0

## 2022-08-17 ASSESSMENT — GAIT ASSESSMENTS
GAIT LEVEL OF ASSIST: SUPERVISED
DEVIATION: NO DEVIATION
GAIT LEVEL OF ASSIST: SUPERVISED
DISTANCE (FEET): 150
DEVIATION: NO DEVIATION
DISTANCE (FEET): 150

## 2022-08-17 ASSESSMENT — ACTIVITIES OF DAILY LIVING (ADL)
TOILETING_LEVEL_OF_ASSIST_DESCRIPTION: GRAB BAR
BED_CHAIR_WHEELCHAIR_TRANSFER_DESCRIPTION: SUPERVISION FOR SAFETY
TOILET_TRANSFER_DESCRIPTION: GRAB BAR;SET-UP OF EQUIPMENT
TUB_SHOWER_TRANSFER_DESCRIPTION: SUPERVISION FOR SAFETY

## 2022-08-17 ASSESSMENT — PATIENT HEALTH QUESTIONNAIRE - PHQ9
1. LITTLE INTEREST OR PLEASURE IN DOING THINGS: NOT AT ALL
2. FEELING DOWN, DEPRESSED, IRRITABLE, OR HOPELESS: NOT AT ALL
SUM OF ALL RESPONSES TO PHQ9 QUESTIONS 1 AND 2: 0

## 2022-08-17 NOTE — THERAPY
"Speech Language Pathology  Daily Treatment     Patient Name: Jess Root  Age:  62 y.o., Sex:  female  Medical Record #: [id number]  Today's Date: 8/17/2022     Precautions  Precautions: Fall Risk  Comments: monitor BP and HR    Subjective    Pt was motivated to participate in this ST session      Objective       08/17/22 1301   Treatment Charges   SLP Cognitive Skill Development First 15 Minutes 1   SLP Cognitive Skill Development Additional 15 Minutes 3   SLP Total Time Spent   SLP Individual Total Time Spent (Mins) 60       Assessment    Reviewed pt's medications, pt was able to recall the purpose of approximately 75% of her medications when she saw the names.  Pt very focused on attempting to memorize how many total pills she takes during each dose in the hospital.  Pt educated that the best thing to practice is accurately reading when/how many to take on the medication bottles as the number of pills she takes in the hospital may vary from what she get from the pharmacy once she discharges home.  Pt completed a medication sorting task x2, 1 error noted on each attempt.  Pt only sorted a 2x daily medication 1 x a day on the first attempt and on the second attempt had one double medication error.  Min cues required on both for pt to locate and correct the mistake.  Pt requested information on where to purchase the 4 x daily pill box she was practicing with, pt was given information on where to purchase this on [employer].      Strengths: Able to follow instructions, Pleasant and cooperative, Willingly participates in therapeutic activities, Motivated for self care and independence, Independent prior level of function  Barriers: Impaired functional cognition    Plan    Continue medication management tasks, Correct errors from \"Who has more\" task, reinforce memory notebook       Speech Therapy Problems (Active)       Problem: Memory STGs       Dates: Start: 08/12/22         Goal: STG-Within one week, patient will " learn and implement functional memory strategies to assist in recall of information related to stroke and daily tasks with 80% accuracy provided TEE FERRARO       Dates: Start: 08/12/22               Problem: Problem Solving STGs       Dates: Start: 08/12/22         Goal: STG-Within one week, patient will complete functional medication management and financial management tasks with 80% accuracy provided SPV.       Dates: Start: 08/12/22               Problem: Speech/Swallowing LTGs       Dates: Start: 08/11/22         Goal: LTG-By discharge, patient will safely swallow regular textures with no overt s/sx of asp/pen noted with 100% accuracy provided MOD I       Dates: Start: 08/11/22            Goal: LTG-By discharge, patient will complete functional problem solving and recall information with 80% accuracy provided MOD I       Dates: Start: 08/12/22               Problem: Swallowing STGs       Dates: Start: 08/11/22

## 2022-08-17 NOTE — CARE PLAN
Problem: Mobility  Goal: Patient's capacity to carry out activities will improve  Note: Patient is able to ambulate this shift with minimal assistance.     Problem: Fall Risk - Rehab  Goal: Patient will remain free from falls  Note: Patient remains free from falls this shift. Patient was educated on using the call light to prevent falls. Patients bed is in the lowest position. The patients belongings are placed in near proximity to the patient.     The patient is Stable - Low risk of patient condition declining or worsening

## 2022-08-17 NOTE — THERAPY
Occupational Therapy  Daily Treatment     Patient Name: Jess Root  Age:  62 y.o., Sex:  female  Medical Record #: [id number]  Today's Date: 8/17/2022     Precautions  Precautions: Fall Risk  Comments: monitor BP and HR         Subjective    Pt seated EOB and requesting bathing for modesty with female therapist, agreeable to OT ADL shower.     Objective       08/17/22 0901   OT Charge Group   OT Self Care / ADL 2   OT Total Time Spent   OT Individual Total Time Spent (Mins) 30   Pain   Intervention Declines   Cognition    Level of Consciousness Alert   Functional Level of Assist   Bathing Supervision   Bathing Description Grab bar;Hand held shower;Tub bench;Increased time;Set-up of equipment;Supervision for safety;Verbal cueing  (Pt able to reach all parts in seated, standing for rear only at SPV level due to increased dizziness with standing.)   Upper Body Dressing Supervision   Upper Body Dressing Description Supervision for safety  (SPV for clothing retrieval standing from bed to closet then sitting EOB. SPV-Mod I for don/doff of pullover shirt and buttoned house coat)   Lower Body Dressing Standby Assist   Lower Body Dressing Description Supervision for safety  (SBA due to increased dizziness in standing during hip hike. Able to complete don/doff of pant/UW/socks without device seated on TTB)   Bed, Chair, Wheelchair Transfer Supervised   Bed Chair Wheelchair Transfer Description Supervision for safety  (No AD)   Tub / Shower Transfers Standby Assist   Tub Shower Transfer Description Supervision for safety  (SBA due to increased dizziness)   Interdisciplinary Plan of Care Collaboration   IDT Collaboration with  Occupational Therapist;Nursing   Patient Position at End of Therapy In Bed;Call Light within Reach;Tray Table within Reach   Collaboration Comments re: Increased dizziness and need for orthostatics     Due to increased dizziness at end of bathing, a w/c was used for transport from BR>EOB for pt  safety with nursing and primary OT to follow up.  Pt demo increased volitional use of L-hand for FMC/GMC tasks with dressing and opening soaps in shower. Increased time needed.    Assessment    Pt making great progress towards goals during bathing and dressing task, but was limited by dizziness at end of session.   Strengths: Able to follow instructions, Alert and oriented, Good carryover of learning, Independent prior level of function, Manages pain appropriately, Motivated for self care and independence, Pleasant and cooperative, Willingly participates in therapeutic activities  Barriers: Fatigue, Decreased endurance, Hemiparesis, Home accessibility, Impaired activity tolerance, Impaired balance, Impaired functional cognition    Plan    Orthostatics, Laundry task, functional mobility, L UE GM/FMC; Distal L UE strengthening, simulated grocery store  activity        Occupational Therapy Goals (Active)       Problem: Dressing       Dates: Start: 08/15/22         Goal: STG-Within one week, patient will dress UB with setup and supervision       Dates: Start: 08/15/22            Goal: STG-Within one week, patient will dress LB with setup and supervision       Dates: Start: 08/15/22               Problem: Grooming       Dates: Start: 08/11/22         Goal: STG-Within one week, patient will complete grooming with set-up A overall utilizing DME/AE as needed.       Dates: Start: 08/11/22         Goal Note filed on 08/15/22 1134 by Sheng Brown, OT/VLADIMIR Farmer A                 Problem: OT Long Term Goals       Dates: Start: 08/11/22         Goal: LTG-By discharge, patient will complete basic self care tasks Mod I overall utilizing DME/AE as needed.       Dates: Start: 08/11/22            Goal: LTG-By discharge, patient will perform bathroom transfers with Mod I overall utilizing DME/AE as needed.       Dates: Start: 08/11/22               Problem: Toileting       Dates: Start: 08/15/22         Goal: STG-Within  one week, patient will complete toileting tasks with supervision       Dates: Start: 08/15/22

## 2022-08-17 NOTE — PROGRESS NOTES
Hospital Medicine Daily Progress Note    Date of Service  8/17/2022    Chief Complaint  Jess Root is a 62 y.o. female admitted 8/10/2022 with stroke    Hospital Course  Ms. Root is a 62-year-old female with hypertension, paroxysmal atrial fibrillation and hypothyroid secondary to thyroidectomy.  On 8/2 she had an acute onset of left hand weakness and an MRI showed bilateral strokes largest in the right frontal parietal lobe and a smaller in the left frontal lobe.  Source was likely her atrial fibrillation.  Patient was switched from aspirin to Xarelto.  She was admitted 8/10/2022 to [facility] for further physical rehabilitation.    Interval Problem Update  8/16/2022 per vitals the patient is afebrile.  She has heart rates ranging from 55-70 overnight.  Oxygen saturations on room air are stable and satisfactory.  Blood pressure mildly elevated with a reading of 140/70.  Patient is alert speech clear.  Has some ongoing left-sided and weakness and deficit in fine motor skills.  States some left-sided numbness on her face.  8/17: Ms. Root was evaluated and examined at rehab. Ms. Root states she felt dizzy in the shower and laid down in the bed afterwards. The morning blood pressure was 116/64. She drank more water today and has felt better since. Her blood pressure has been appropriate afterwards and went up to 137/73. She remains on max dose Lipitor 80 mg.     I have discussed this patient's plan of care and discharge plan at IDT rounds today with Case Management, Nursing, Nursing leadership, and other members of the IDT team.    Consultants/Specialty  physiatry    Code Status  Full Code    Disposition  Patient is not medically cleared for discharge.   Anticipate discharge to be determined.  I have placed the appropriate orders for post-discharge needs.    Review of Systems  Review of Systems   Constitutional:  Negative for chills and fever.   Respiratory:  Negative for shortness of breath.    Cardiovascular:   Negative for chest pain.   Neurological:  Positive for dizziness. Negative for loss of consciousness and headaches.   All other systems reviewed and are negative.     Physical Exam  Temp:  [36.6 °C (97.9 °F)-36.8 °C (98.2 °F)] 36.6 °C (97.9 °F)  Pulse:  [59-79] 59  Resp:  [18] 18  BP: (116-154)/(63-78) 116/64  SpO2:  [95 %-97 %] 97 %    Physical Exam  Vitals and nursing note reviewed.   Constitutional:       General: She is not in acute distress.     Appearance: She is not ill-appearing.   HENT:      Head: Normocephalic and atraumatic.      Mouth/Throat:      Mouth: Mucous membranes are dry.   Eyes:      General: No scleral icterus.     Conjunctiva/sclera: Conjunctivae normal.   Cardiovascular:      Rate and Rhythm: Normal rate and regular rhythm.   Abdominal:      General: There is no distension.      Tenderness: There is no abdominal tenderness.      Comments: Right abd bruise with underlying hematoma   Musculoskeletal:      Cervical back: Normal range of motion and neck supple.      Right lower leg: No edema.      Left lower leg: No edema.   Skin:     General: Skin is warm and dry.      Coloration: Skin is pale.   Neurological:      Mental Status: She is alert and oriented to person, place, and time.   Psychiatric:         Mood and Affect: Mood normal.         Behavior: Behavior normal.       Fluids    Intake/Output Summary (Last 24 hours) at 8/17/2022 0736  Last data filed at 8/17/2022 0125  Gross per 24 hour   Intake 730 ml   Output --   Net 730 ml       Laboratory                        Imaging  No orders to display        Assessment/Plan  * Ischemic stroke (HCC)- (present on admission)  Assessment & Plan  MRI: showed bilateral strokes, largest in right frontal  parietal lobe in the SOPHIA territory, smaller in the left frontal lobe, also in the SOPHIA territory  S/P tPA (at [facility])  On Xarelto due to etiology likely A. fib  Physical rehabilitation ongoing  Lipitor and metoprolol    Erythrocytosis-  (present on admission)  Assessment & Plan  Improved  8/14 Hgb: 15.3  Suspect 2nd to dehydration  Encouraging fluid intake  Note: if persists, would recommend Hematology as an out patient  Monitor    Essential hypertension- (present on admission)  Assessment & Plan  She had been on Toprol 25 mg daily  Metoprolol 12.5 mg twice a day with parameters  Adjust accordingly based on her blood pressures.     Paroxysmal atrial fibrillation (HCC)- (present on admission)  Assessment & Plan  On Lopressor: 25 mg bid --> will decrease to 12.5 mg bid (starting 8/15 night)  Rivaroxaban 20 mg for anticoagulation      Hypothyroidism- (present on admission)  Assessment & Plan  (8/12/22): TSH: 3.08 ; FT4: 2.08  On Synthroid --> dose decreased recently currently on 100 mcg previously on 112 mcg  Note: pt has an elevated FT4 over the past couple years  Note: needs repeat TFT's in about 6 weeks       VTE prophylaxis: therapeutic anticoagulation with Xarelto    I have performed a physical exam and reviewed and updated ROS and Plan today (8/17/2022). In review of yesterday's note (8/16/2022), there are no changes except as documented above.

## 2022-08-17 NOTE — PROGRESS NOTES
"REHABILITATION PSYCHOLOGY FOLLOW-UP:  Reason for admission: Ischemic stroke (HCC) [I63.9]  Length of Visit: 26 minutes    Chief Complaint: Stroke recovery    S: Met with the patient for brief individual psychotherapy. Patient presented with a euthymic affect and reported a \"doing much better\" mood. Pt reports that her emotional lability has progressively stabilized. Session continued to focus on psychoeducation about the emotional and cognitive impact of stroke and what to expect during the acute recovery period from stroke. Pt continues to be concerned about her leave of absence from work. Notes demonstrate that case management is actively working on this issue. Will continue to follow.    O: Psychiatric Examination:  Vitals: Blood pressure 137/73, pulse 63, temperature 36.8 °C (98.2 °F), temperature source Oral, resp. rate 17, height 1.524 m (5'), weight 63.5 kg (139 lb 15.9 oz), SpO2 94 %, not currently breastfeeding.  Musculoskeletal: Sitting in bed normal psychomotor activity, no tics or unusual mannerisms noted  Appearance and Eye Contact: Easily established rapport WDWN, appropriate dress and grooming. Behavior is calm, cooperative,  appropriate eye contact  Attention/Alertness: Alert  Thought Process: Linear, Logical, and Goal Directed    Thought Content: No psychotic processes noted  Speech: Clear with normal rate and rhythm  Mood: \"overwhelmed but grateful\"            Affect: Euthymic with some tearfulness         SI/HI: Denies     Memory: Recent and remote memory appear intact     Orientation: alert, oriented to person, place and time  Insight into symptoms: within normal limits  Judgement into symptoms:within normal limits      ASSESSMENT: Jess Root is a 62 y.o. female with a past medical history of paroxysmal atrial fibrillation, hypothyroidism secondary to thyroidectomy; now admitted for acute inpatient rehabilitation with severe functional debility after an acute stroke.       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     She had negative Head CT and CTA for LVO. She received tPA. MRI showed bilateral strokes, largest in right frontal  parietal lobe in the SOPHIA territory, smaller in the left frontal lobe, also in the SOPHIA territory. Due to multiple vascular territories and history of atrial fibrillation, patient was switched from aspirin to Xarelto. HgbA1c 5.3, , ECHO EF 65-70%. She will need outpatient follow up with both neurology and cardiology.     Psychology was consulted due to emotional lability following stroke and adjustment to stroke and recovery. Session focused on assessment of current functioning and psychoeducation about the cognitive and emotional components of stroke recovery. Will continue to follow.     DSM5 Diagnostic Considerations: Adj d/o with mixed anxiety and depressed mood        PLAN:  Records reviewed: yes  Discussed patient with other provider: Jaz  Will continue to follow  Thank you for the consult.     Ciara Hewitt, Ph.D.

## 2022-08-17 NOTE — THERAPY
Occupational Therapy  Daily Treatment     Patient Name: Jess Root  Age:  62 y.o., Sex:  female  Medical Record #: [id number]  Today's Date: 8/17/2022     Precautions  Precautions: (P) Fall Risk  Comments: monitor BP and HR         Subjective    Patient resting in bed upon OT arrival.  Stated she was fatigued from shower and was lightheaded towards end of shower.     Objective       08/17/22 1031   OT Charge Group   OT Therapy Activity 4   OT Total Time Spent   OT Individual Total Time Spent (Mins) 60   Precautions   Precautions Fall Risk   Pain 0 - 10 Group   Therapist Pain Assessment 0   Functional Level of Assist   Grooming Supervision;Standing   Grooming Description Standing at sink  (to wash hands)   Toileting Modified Independent   Toileting Description Grab bar   Bed, Chair, Wheelchair Transfer Supervised   Bed Chair Wheelchair Transfer Description   (no AD)   Toilet Transfers Supervised   Toilet Transfer Description Grab bar;Set-up of equipment   Fine Motor / Dexterity    Fine Motor / Dexterity Yes   Fine Motor / Dexterity Interventions Bilateral UE FMC and GM task of picking up pipes and connecting pieces and putting together to copy a design.  Patient then pulled them apart and put back in their respective bins.  Required min verbal cues for technique of using hands together.   IADL Treatments   IADL Treatments Kitchen mobility education;Meal preparation;Home management   Kitchen Mobility Education Supervised to retrieve all needed items from kitchen area to boil an egg   Meal Preparation Supervised to boil an egg using stove.  Used appropriate temperature on burner and turned on when done independently.   Home Management Rinsed dishes and placed in  independently.  Patient loaded washing machien with clothes, soap and started machine independently while standing.   Interdisciplinary Plan of Care Collaboration   Patient Position at End of Therapy Seated  (in t dine)     Measured orthostatic  BP's:  Supine: 125/83 HR 66  Sitting after 3 minutes: 136/83 HR 73  Standing after 3 minutes: 124/84 HR 78    Assessment    Patient demonstrated ability to complete simple meal prep, laundry, kitchen mobility and cleanup.  Expressed she was fatigued, but managed to complete many tasks this session.   Strengths: Able to follow instructions, Alert and oriented, Good carryover of learning, Independent prior level of function, Manages pain appropriately, Motivated for self care and independence, Pleasant and cooperative, Willingly participates in therapeutic activities  Barriers: Fatigue, Decreased endurance, Hemiparesis, Home accessibility, Impaired activity tolerance, Impaired balance, Impaired functional cognition    Plan    ADLs, functional mobility, L UE GM/FMC; Distal L UE strengthening, simulated grocery store  activity    Occupational Therapy Goals (Active)       Problem: Dressing       Dates: Start: 08/15/22         Goal: STG-Within one week, patient will dress UB with setup and supervision       Dates: Start: 08/15/22            Goal: STG-Within one week, patient will dress LB with setup and supervision       Dates: Start: 08/15/22               Problem: Grooming       Dates: Start: 08/11/22         Goal: STG-Within one week, patient will complete grooming with set-up A overall utilizing DME/AE as needed.       Dates: Start: 08/11/22         Goal Note filed on 08/15/22 1134 by Sheng Brown OT/VLADIMIR GONZALES                 Problem: OT Long Term Goals       Dates: Start: 08/11/22         Goal: LTG-By discharge, patient will complete basic self care tasks Mod I overall utilizing DME/AE as needed.       Dates: Start: 08/11/22            Goal: LTG-By discharge, patient will perform bathroom transfers with Mod I overall utilizing DME/AE as needed.       Dates: Start: 08/11/22               Problem: Toileting       Dates: Start: 08/15/22         Goal: STG-Within one week, patient will complete toileting  tasks with supervision       Dates: Start: 08/15/22

## 2022-08-17 NOTE — THERAPY
Physical Therapy   Daily Treatment     Patient Name: Jess Root  Age:  62 y.o., Sex:  female  Medical Record #: [id number]  Today's Date: 8/17/2022     Precautions  Precautions: Fall Risk  Comments: monitor BP and HR    Subjective    Patient is agreeable to participate, is willing to work on stair training today. Has some trouble with her buttons on her shirt, is willing to try to learn a button hook     Objective       08/17/22 1431   PT Charge Group   PT Therapeutic Activities 2   PT Total Time Spent   PT Individual Total Time Spent (Mins) 30   Gait Functional Level of Assist    Gait Level Of Assist Supervised   Assistive Device None   Distance (Feet) 150   # of Times Distance was Traveled 3   Deviation No deviation   Stairs Functional Level of Assist   Level of Assist with Stairs Contact Guard Assist   # of Stairs Climbed 8   Stairs Description Extra time;Hand rails;Limited by fatigue;Safety concerns;Supervision for safety  (step over step during ascent, LLE leading step to during descent. Has one near loss of balance when attempting step over step descending, requires min assist to recover.)   Neuro-Muscular Treatments   Comments fall recovery x 2: first attempt requires mod assist to advance LLE underneath her while lifting with BUE/ RLE. Second attempt patient was able to advance LLE with supervision assist   Interdisciplinary Plan of Care Collaboration   Patient Position at End of Therapy In Bed;Call Light within Reach;Tray Table within Reach;Phone within Reach       Assessment    Patient demonstrates improved trunk/ arm swing with gait, is ambulating at faster speed. She is also improving confidence in willingness to try higher level balance tasks, like stairs step over step and floor/ fall recovery.     Strengths: Able to follow instructions, Effective communication skills, Independent prior level of function, Motivated for self care and independence, Pleasant and cooperative, Willingly participates in  therapeutic activities, Good carryover of learning, Good insight into deficits/needs    Plan    Dynamic balance with head turns, BLE strengthening, cardiovascular endurance    Passport items to be completed:  Get in/out of bed safely, in/out of a vehicle, safely use mobility device, walk or wheel around home/community, navigate up and down stairs, show how to get up/down from the ground, ensure home is accessible, demonstrate HEP, complete caregiver training    Physical Therapy Problems (Active)       Problem: Balance       Dates: Start: 08/11/22         Goal: STG-Within one week, patient will maintain dynamic standing on foam with reaching tasks       Dates: Start: 08/11/22         Goal Note filed on 08/15/22 1239 by Julius Hilliard, PT       FT w/ trunk turning and UE reaching cones both side x4 w/ CGA, several LOBs.                 Problem: Mobility       Dates: Start: 08/11/22         Goal: STG-Within one week, patient will ambulate up/down flight of stairs with supervision assist       Dates: Start: 08/11/22         Goal Note filed on 08/15/22 1239 by Julius Hilliard, PT       CGA for 14 stairs at this time              Goal: STG-Within one week, patient will ambulate 300 feet at IND level without an AD.       Dates: Start: 08/15/22               Problem: Mobility Transfers       Dates: Start: 08/15/22         Goal: STG-Within one week, patient will transfer bed to chair at IND level without an AD.       Dates: Start: 08/15/22               Problem: PT-Long Term Goals       Dates: Start: 08/11/22         Goal: LTG-By discharge, patient will tolerate standing x 15 minutes while performing standing dynamic tasks       Dates: Start: 08/11/22            Goal: LTG-By discharge, patient will ambulate up/down flight of stairs independently       Dates: Start: 08/11/22            Goal: LTG-By discharge, patient will transfer in/out of a car independently.       Dates: Start: 08/11/22

## 2022-08-17 NOTE — PROGRESS NOTES
Rehab Progress Note     Encounter Date: 8/17/2022     CC: Dizziness after shower    Interval Events (Subjective)  Vital signs: Stable.  Mild chronic bradycardia in the 50s.  Bowel movement: 8/16  Voiding volitionally  PRN Rx: None in 24 hrs    Patient seen and examined in the lunchroom.  Patient states that she is doing well however she did have some dizziness after her shower today but she was extremely busy today.  Therapist measured her for orthostatic hypotension and it was negative.  No other systemic complaints.    14 point ROS reviewed and negative except as stated above.     Objective:  VITAL SIGNS: /64   Pulse (!) 59   Temp 36.6 °C (97.9 °F) (Temporal)   Resp 18   Ht 1.524 m (5')   Wt 63.5 kg (139 lb 15.9 oz)   SpO2 97%   BMI 27.34 kg/m²     GEN: No apparent distress, sitting comfortably in lunchroom.  HEENT: Head normocephalic, atraumatic.  Sclera nonicteric bilaterally, no ocular discharge appreciated bilaterally.  CV: Extremities warm and well-perfused, no peripheral edema appreciated bilaterally.  PULMONARY: Breathing nonlabored on room air, no respiratory accessory muscle use.  Not requiring supplemental oxygen.  ABD: Soft, nontender.  SKIN: No appreciable skin breakdown on exposed areas of skin.  PSYCH: Mood and affect within normal limits.  NEURO: Awake alert.  Conversational.  Logical thought content.  Ambulatory.  Left upper extremity hemiparesis greater than left lower extremity, but significantly improving.  No spasticity on exam today.    No results found for this or any previous visit (from the past 72 hour(s)).      Current Facility-Administered Medications   Medication Frequency    metoprolol tartrate (LOPRESSOR) tablet 12.5 mg TWICE DAILY    levothyroxine (SYNTHROID) tablet 100 mcg AM ES    hydrOXYzine HCl (ATARAX) tablet 50 mg Q6HRS PRN    melatonin tablet 3 mg HS PRN    Respiratory Therapy Consult Continuous RT    Pharmacy Consult Request ...Pain Management Review 1 Each  PHARMACY TO DOSE    acetaminophen (Tylenol) tablet 650 mg Q4HRS PRN    senna-docusate (PERICOLACE or SENOKOT S) 8.6-50 MG per tablet 2 Tablet BID    And    polyethylene glycol/lytes (MIRALAX) PACKET 1 Packet QDAY PRN    And    magnesium hydroxide (MILK OF MAGNESIA) suspension 30 mL QDAY PRN    And    bisacodyl (DULCOLAX) suppository 10 mg QDAY PRN    lactulose 20 GM/30ML solution 30 mL QDAY PRN    docusate sodium (ENEMEEZ) enema 283 mg QDAY PRN    omeprazole (PRILOSEC) capsule 20 mg QAM AC    artificial tears ophthalmic solution 1 Drop PRN    mag hydrox-al hydrox-simeth (MAALOX PLUS ES or MYLANTA DS) suspension 20 mL Q2HRS PRN    ondansetron (ZOFRAN ODT) dispertab 4 mg 4X/DAY PRN    Or    ondansetron (ZOFRAN) syringe/vial injection 4 mg 4X/DAY PRN    traZODone (DESYREL) tablet 50 mg QHS PRN    sodium chloride (OCEAN) 0.65 % nasal spray 2 [place] PRN    hydrALAZINE (APRESOLINE) tablet 10 mg Q8HRS PRN    rivaroxaban (XARELTO) tablet 20 mg PM MEAL    atorvastatin (LIPITOR) tablet 80 mg Q EVENING    midazolam (VERSED) 5 mg/mL (1 mL vial) PRN    nystatin (MYCOSTATIN) 625780 UNIT/ML suspension 500,000 Units 4X/DAY       Orders Placed This Encounter   Procedures    Diet Order Diet: Regular     Standing Status:   Standing     Number of Occurrences:   1     Order Specific Question:   Diet:     Answer:   Regular [1]       Assessment:  Active Hospital Problems    Diagnosis     *Ischemic stroke (HCC)     Erythrocytosis     Paroxysmal atrial fibrillation (HCC)     Essential hypertension     Hypothyroidism        Medical Decision Making and Plan:  Bilateral strokes  Right frontal parietal  Left frontal  Left hemiparesis, arm >leg  Non-dominant  Cardioembolic  Continue full rehab program  PT/OT/SLP, 1 hr each discipline, 5 days per week     Xarelto  Statin     Outpatient follow-up with [facility]    Emotional lability  8/15 In setting of right frontal stroke  Consider psychology evaluation     Paroxysmal atrial  fibrillation  Atrial fibrillation with RVR 8/11  Metoprolol  Xarelto  Checked EKG, RVR with rate 157 8/11 -likely secondary to her morning dose metoprolol held due to mild bradycardia and hypotension  8/16: 1 episode more elevated blood pressure at systolic 140 compared to prior after reduction in metoprolol from 25 mg twice daily to 12.5 mg twice daily on 8/15.  8/17: BP and HR stable    Hypertension  Hypotension 8/11  Metoprolol  Lisinopril, discontinued  8/16: 1x /70.  Monitor.  Metoprolol recently reduced.  8/17: BP stable.     Hypothyroidism  History of thyroidectomy  Levothyroxine  Per patient dose recently decreased  Checked TSH, normal TSH, elevated free T4  Dose of levothyroxine decreased  Appreciate hospitalist assistance     GI prophylaxis  Omeprazole     Oral thrush  Continue nystatin     Bowel program  Continue bowel medications  Last BM 8/16     Bladder program  Check PVRs - 40  Bladder scan for no voids  ICP for over 400 cc  Scheduled toileting      DVT prophylaxis  Xarelto    Total time: 20 minutes.  I spent greater than 50% of the time for patient care and coordination on this date, including unit/floor time, and face-to-face time with the patient as per assessment and plan above.    Apolonia Gastelum D.O.

## 2022-08-17 NOTE — DISCHARGE PLANNING
CM not getting call back from patients contact/MGR at place of employment.  CM called main customer service dept. And had to leave message for a benefits person to call this CM back.  CM awaiting call.

## 2022-08-17 NOTE — CARE PLAN
Problem: Neuro Status  Goal: Neuro status will remain stable or improve  Outcome: Progressing  Note: Pt awake alert and oriented , coherent, calm and cooperative with treatment . Pt able to move left hand . Cont monitored.     Problem: Fall Risk - Rehab  Goal: Patient will remain free from falls  Outcome: Progressing  Note: Orly Smith Fall risk Assessment Score: 8    Low fall risk interventions   - Call light within reach   - Yellow  socks   - Belongings within reach   - Bed in the lowest position          The patient is Stable - Low risk of patient condition declining or worsening    Shift Goals  Clinical Goals: Safety  Patient Goals: Sleep well    Progress made toward(s) clinical / shift goals:  Pt free from fall and injury.    P

## 2022-08-18 PROCEDURE — 700102 HCHG RX REV CODE 250 W/ 637 OVERRIDE(OP): Performed by: HOSPITALIST

## 2022-08-18 PROCEDURE — A9270 NON-COVERED ITEM OR SERVICE: HCPCS | Performed by: PHYSICAL MEDICINE & REHABILITATION

## 2022-08-18 PROCEDURE — A9270 NON-COVERED ITEM OR SERVICE: HCPCS | Performed by: HOSPITALIST

## 2022-08-18 PROCEDURE — 99232 SBSQ HOSP IP/OBS MODERATE 35: CPT | Performed by: PHYSICAL MEDICINE & REHABILITATION

## 2022-08-18 PROCEDURE — 94760 N-INVAS EAR/PLS OXIMETRY 1: CPT

## 2022-08-18 PROCEDURE — 97129 THER IVNTJ 1ST 15 MIN: CPT

## 2022-08-18 PROCEDURE — 97110 THERAPEUTIC EXERCISES: CPT

## 2022-08-18 PROCEDURE — 700102 HCHG RX REV CODE 250 W/ 637 OVERRIDE(OP): Performed by: PHYSICAL MEDICINE & REHABILITATION

## 2022-08-18 PROCEDURE — 97130 THER IVNTJ EA ADDL 15 MIN: CPT

## 2022-08-18 PROCEDURE — 99232 SBSQ HOSP IP/OBS MODERATE 35: CPT | Performed by: HOSPITALIST

## 2022-08-18 PROCEDURE — 770010 HCHG ROOM/CARE - REHAB SEMI PRIVAT*

## 2022-08-18 PROCEDURE — 97530 THERAPEUTIC ACTIVITIES: CPT

## 2022-08-18 PROCEDURE — 97535 SELF CARE MNGMENT TRAINING: CPT

## 2022-08-18 RX ORDER — POLYETHYLENE GLYCOL 3350 17 G/17G
1 POWDER, FOR SOLUTION ORAL
Status: DISCONTINUED | OUTPATIENT
Start: 2022-08-18 | End: 2022-08-22 | Stop reason: HOSPADM

## 2022-08-18 RX ORDER — ATORVASTATIN CALCIUM 40 MG/1
40 TABLET, FILM COATED ORAL EVERY EVENING
Status: DISCONTINUED | OUTPATIENT
Start: 2022-08-18 | End: 2022-08-22 | Stop reason: HOSPADM

## 2022-08-18 RX ORDER — BISACODYL 10 MG
10 SUPPOSITORY, RECTAL RECTAL
Status: DISCONTINUED | OUTPATIENT
Start: 2022-08-18 | End: 2022-08-22 | Stop reason: HOSPADM

## 2022-08-18 RX ORDER — AMOXICILLIN 250 MG
1 CAPSULE ORAL DAILY
Status: DISCONTINUED | OUTPATIENT
Start: 2022-08-19 | End: 2022-08-22 | Stop reason: HOSPADM

## 2022-08-18 RX ADMIN — RIVAROXABAN 20 MG: 20 TABLET, FILM COATED ORAL at 17:04

## 2022-08-18 RX ADMIN — OMEPRAZOLE 20 MG: 20 CAPSULE, DELAYED RELEASE ORAL at 07:49

## 2022-08-18 RX ADMIN — METOPROLOL TARTRATE 12.5 MG: 25 TABLET, FILM COATED ORAL at 05:13

## 2022-08-18 RX ADMIN — LEVOTHYROXINE SODIUM 100 MCG: 0.05 TABLET ORAL at 05:12

## 2022-08-18 RX ADMIN — ATORVASTATIN CALCIUM 40 MG: 40 TABLET, FILM COATED ORAL at 20:40

## 2022-08-18 RX ADMIN — METOPROLOL TARTRATE 12.5 MG: 25 TABLET, FILM COATED ORAL at 17:04

## 2022-08-18 ASSESSMENT — ACTIVITIES OF DAILY LIVING (ADL)
TOILETING_LEVEL_OF_ASSIST_DESCRIPTION: GRAB BAR
TOILET_TRANSFER_DESCRIPTION: GRAB BAR

## 2022-08-18 ASSESSMENT — ENCOUNTER SYMPTOMS
FEVER: 0
ROS GI COMMENTS: EATING WELL
SHORTNESS OF BREATH: 0
HEADACHES: 0
CHILLS: 0
DIZZINESS: 0
LOSS OF CONSCIOUSNESS: 0

## 2022-08-18 NOTE — THERAPY
Speech Language Pathology  Daily Treatment     Patient Name: Jess Root  Age:  62 y.o., Sex:  female  Medical Record #: [id number]  Today's Date: 8/18/2022     Precautions  Precautions: Fall Risk  Comments: monitor BP and HR    Subjective    Pt pleasant and cooperative.     Objective       08/18/22 0933   Treatment Charges   SLP Cognitive Skill Development First 15 Minutes 1   SLP Cognitive Skill Development Additional 15 Minutes 3   SLP Total Time Spent   SLP Individual Total Time Spent (Mins) 60       Assessment    Pt with questions related to medication administration specifically with BP meds (holding for low BP and or HR). Pt completed functional medication sort with single error, pt sorted a med that was two tablets twice a day, correctly for breakfast and incorrect with only one tablet for dinner, MIN cues to correct. Pt corrected errors from who has more task with MIN A.     Strengths: Able to follow instructions, Pleasant and cooperative, Willingly participates in therapeutic activities, Motivated for self care and independence, Independent prior level of function  Barriers: Impaired functional cognition    Plan    Repeat meds sort to ensure 100% accuracy, cont money management tasks    Speech Therapy Problems (Active)       Problem: Memory STGs       Dates: Start: 08/12/22         Goal: STG-Within one week, patient will learn and implement functional memory strategies to assist in recall of information related to stroke and daily tasks with 80% accuracy provided MIN A.       Dates: Start: 08/12/22               Problem: Problem Solving STGs       Dates: Start: 08/12/22         Goal: STG-Within one week, patient will complete functional medication management and financial management tasks with 80% accuracy provided SPV.       Dates: Start: 08/12/22               Problem: Speech/Swallowing LTGs       Dates: Start: 08/11/22         Goal: LTG-By discharge, patient will safely swallow regular textures with  no overt s/sx of asp/pen noted with 100% accuracy provided MOD I       Dates: Start: 08/11/22            Goal: LTG-By discharge, patient will complete functional problem solving and recall information with 80% accuracy provided MOD I       Dates: Start: 08/12/22               Problem: Swallowing STGs       Dates: Start: 08/11/22

## 2022-08-18 NOTE — PROGRESS NOTES
Rehab Progress Note     Encounter Date: 8/18/2022     CC: Medication questions    Interval Events (Subjective)  Vital signs: Stable.  Blood pressure stable and within normal limits.  Bowel movement: 8/17 loose.  Voiding volitionally  PRN Rx: Tylenol 8/17    Patient seen and examined in her room laying in bed.  She states she is doing well.  Has questions regarding her metoprolol.  States that she is having loose bowel movements would like to reduce her stool softeners and laxatives.  Is feeling more confident regarding her function.  Denies any systemic symptoms.  States that her left hand occasionally feels swollen.    14 point ROS reviewed and negative except as stated above.     Objective:  VITAL SIGNS: /60   Pulse 61   Temp 36.4 °C (97.6 °F) (Oral)   Resp 17   Ht 1.524 m (5')   Wt 63.5 kg (139 lb 15.9 oz)   SpO2 95%   BMI 27.34 kg/m²     GEN: No apparent distress, laying in bed comfortably.  HEENT: Head normocephalic, atraumatic.  Sclera nonicteric bilaterally, no ocular discharge appreciated bilaterally.  CV: Extremities warm and well-perfused, no peripheral edema appreciated bilaterally.  PULMONARY: Breathing nonlabored on room air, no respiratory accessory muscle use.  Not requiring supplemental oxygen.  ABD: Soft, nontender.  SKIN: No appreciable skin breakdown on exposed areas of skin.  PSYCH: Mood and affect within normal limits.  NEURO: Awake alert.  Conversational.  Logical thought content.  Left hemiparesis, improving.  Ambulatory.      No results found for this or any previous visit (from the past 72 hour(s)).      Current Facility-Administered Medications   Medication Frequency    atorvastatin (LIPITOR) tablet 40 mg Q EVENING    metoprolol tartrate (LOPRESSOR) tablet 12.5 mg TWICE DAILY    levothyroxine (SYNTHROID) tablet 100 mcg AM ES    hydrOXYzine HCl (ATARAX) tablet 50 mg Q6HRS PRN    melatonin tablet 3 mg HS PRN    Respiratory Therapy Consult Continuous RT    Pharmacy Consult Request  ...Pain Management Review 1 Each PHARMACY TO DOSE    acetaminophen (Tylenol) tablet 650 mg Q4HRS PRN    senna-docusate (PERICOLACE or SENOKOT S) 8.6-50 MG per tablet 2 Tablet BID    And    polyethylene glycol/lytes (MIRALAX) PACKET 1 Packet QDAY PRN    And    magnesium hydroxide (MILK OF MAGNESIA) suspension 30 mL QDAY PRN    And    bisacodyl (DULCOLAX) suppository 10 mg QDAY PRN    lactulose 20 GM/30ML solution 30 mL QDAY PRN    docusate sodium (ENEMEEZ) enema 283 mg QDAY PRN    omeprazole (PRILOSEC) capsule 20 mg QAM AC    artificial tears ophthalmic solution 1 Drop PRN    mag hydrox-al hydrox-simeth (MAALOX PLUS ES or MYLANTA DS) suspension 20 mL Q2HRS PRN    ondansetron (ZOFRAN ODT) dispertab 4 mg 4X/DAY PRN    Or    ondansetron (ZOFRAN) syringe/vial injection 4 mg 4X/DAY PRN    traZODone (DESYREL) tablet 50 mg QHS PRN    sodium chloride (OCEAN) 0.65 % nasal spray 2 [place] PRN    hydrALAZINE (APRESOLINE) tablet 10 mg Q8HRS PRN    rivaroxaban (XARELTO) tablet 20 mg PM MEAL    midazolam (VERSED) 5 mg/mL (1 mL vial) PRN       Orders Placed This Encounter   Procedures    Diet Order Diet: Regular     Standing Status:   Standing     Number of Occurrences:   1     Order Specific Question:   Diet:     Answer:   Regular [1]       Assessment:  Active Hospital Problems    Diagnosis     *Ischemic stroke (HCC)     Erythrocytosis     Paroxysmal atrial fibrillation (HCC)     Essential hypertension     Hypothyroidism        Medical Decision Making and Plan:  Bilateral strokes  Right frontal parietal  Left frontal  Left hemiparesis, arm >leg  Non-dominant  Cardioembolic  Continue full rehab program  PT/OT/SLP, 1 hr each discipline, 5 days per week     Xarelto  Statin     Outpatient follow-up with [place] neurology    Emotional lability  8/15 In setting of right frontal stroke  Consider psychology evaluation     Paroxysmal atrial fibrillation  Atrial fibrillation with RVR 8/11  Metoprolol  Xarelto  Checked EKG, RVR with rate  157 8/11 -likely secondary to her morning dose metoprolol held due to mild bradycardia and hypotension  8/16: 1 episode more elevated blood pressure at systolic 140 compared to prior after reduction in metoprolol from 25 mg twice daily to 12.5 mg twice daily on 8/15.  8/18: BP and HR stable    Hypertension  Hypotension 8/11  Metoprolol  Lisinopril, discontinued  8/16: 1x /70.  Monitor.  Metoprolol recently reduced.  8/18: BP stable.     Hypothyroidism  History of thyroidectomy  Levothyroxine  Per patient dose recently decreased  Checked TSH, normal TSH, elevated free T4  Dose of levothyroxine decreased  Appreciate hospitalist assistance     GI prophylaxis  Omeprazole     Oral thrush  Continue nystatin     Bowel program  Continue bowel medications  Last BM 8/17 - loose  Reduced Senokot from 2 tablets twice daily to 1 tablet daily per patient preference     Bladder program  Voiding volitionally     DVT prophylaxis  Xarelto    Discussed with hospitalist    Total time: 25 minutes.  I spent greater than 50% of the time for patient care and coordination on this date, including unit/floor time, and face-to-face time with the patient as per assessment and plan above.    Apolonia Gastelum D.O.

## 2022-08-18 NOTE — FLOWSHEET NOTE
08/18/22 1034   Events/Summary/Plan   Events/Summary/Plan Pt on RA stable no complaints   Vital Signs   Pulse 61   Respiration 17   Pulse Oximetry 95 %   $ Pulse Oximetry (Spot Check) Yes   Respiratory Assessment   Respiratory Pattern Within Normal Limits   Level of Consciousness Alert   Chest Exam   Work Of Breathing / Effort Within Normal Limits   Breath Sounds   RUL Breath Sounds Clear   RML Breath Sounds Clear   RLL Breath Sounds Clear   NIRAV Breath Sounds Clear   LLL Breath Sounds Clear   Oxygen   O2 (LPM) 0   O2 Delivery Device None - Room Air

## 2022-08-18 NOTE — THERAPY
Occupational Therapy  Daily Treatment     Patient Name: Jess Root  Age:  62 y.o., Sex:  female  Medical Record #: [id number]  Today's Date: 8/18/2022     Precautions  Precautions: (P) Fall Risk  Comments: monitor BP and HR         Subjective    Patient sitting in w/c in room and agreeable to work on L hand function.     Objective       08/18/22 1331   OT Charge Group   OT Self Care / ADL 2   OT Therapy Activity 1   OT Therapeutic Exercise  1   OT Total Time Spent   OT Individual Total Time Spent (Mins) 60   Precautions   Precautions Fall Risk   Functional Level of Assist   Toileting Modified Independent   Toileting Description Grab bar   Bed, Chair, Wheelchair Transfer Supervised   Toilet Transfers Modified Independent   Toilet Transfer Description Grab bar   Hand Strengthening   Hand Strengthening Left ;Left Pinch;Left Finger Flexion;Gross Grasp Left;Theraputty (Comment on Resistance)   Comment L hand/finger strengthening exercises using orange t-putty.  Dug out seven small pegs from putty with left index finger for flexor strenthening.   Fine Motor / Dexterity    Fine Motor / Dexterity Interventions B UE functional FMC tasks working with various types of clothing fasteners, including buttons, zippers, snaps, clasps and bows.  Attempted to  and place large pegs into large, slanted pegboard with L UE.   Interdisciplinary Plan of Care Collaboration   Patient Position at End of Therapy In Bed;Call Light within Reach;Tray Table within Reach;Phone within Reach     Functional mobility supervised without AD in room, hallways, gym.      Assessment    Patient unable to hold large pegs with three jaw sam grasp due to weakness in fingers.   Strengths: Able to follow instructions, Alert and oriented, Good carryover of learning, Independent prior level of function, Manages pain appropriately, Motivated for self care and independence, Pleasant and cooperative, Willingly participates in therapeutic  activities  Barriers: Fatigue, Decreased endurance, Hemiparesis, Home accessibility, Impaired activity tolerance, Impaired balance, Impaired functional cognition    Plan    ADLs, functional mobility, L UE GM/FMC; Distal L UE strengthening, simulated grocery store  activity    Occupational Therapy Goals (Active)       Problem: Dressing       Dates: Start: 08/15/22         Goal: STG-Within one week, patient will dress UB with setup and supervision       Dates: Start: 08/15/22            Goal: STG-Within one week, patient will dress LB with setup and supervision       Dates: Start: 08/15/22               Problem: Grooming       Dates: Start: 08/11/22         Goal: STG-Within one week, patient will complete grooming with set-up A overall utilizing DME/AE as needed.       Dates: Start: 08/11/22         Goal Note filed on 08/15/22 1134 by Sheng Brown OT/VLADIMIR GONZALES                 Problem: OT Long Term Goals       Dates: Start: 08/11/22         Goal: LTG-By discharge, patient will complete basic self care tasks Mod I overall utilizing DME/AE as needed.       Dates: Start: 08/11/22            Goal: LTG-By discharge, patient will perform bathroom transfers with Mod I overall utilizing DME/AE as needed.       Dates: Start: 08/11/22               Problem: Toileting       Dates: Start: 08/15/22         Goal: STG-Within one week, patient will complete toileting tasks with supervision       Dates: Start: 08/15/22

## 2022-08-18 NOTE — CARE PLAN
Problem: Fall Risk - Rehab  Goal: Patient will remain free from falls  Outcome: Progressing  Note: Orly Smith Fall risk Assessment Score: 7    Low fall risk interventions   - Call light within reach   - Yellow  socks   - Belongings within reach   - Bed in the lowest position        Problem: Pain - Standard  Goal: Alleviation of pain or a reduction in pain to the patient’s comfort goal  Outcome: Progressing  Note: Assessed for pain and discomfort, medicated with tylenol for pain with relief. [ See mar] Cont monitored.   The patient is Stable - Low risk of patient condition declining or worsening    Shift Goals  Clinical Goals: Safety  Patient Goals: Sleep well    Progress made toward(s) clinical / shift goals:  Pt free from fall and injury.

## 2022-08-18 NOTE — CARE PLAN
The patient is Stable - Low risk of patient condition declining or worsening      Problem: Fall Risk - Rehab  Goal: Patient will remain free from falls  Note: Patient uses call light consistently and appropriately this shift.  Waits for assistance when needed and does not attempt self transfer.  Able to verbalize needs.  Will continue to monitor.      Problem: Pain - Standard  Goal: Alleviation of pain or a reduction in pain to the patient’s comfort goal  Note: Pt able to participate in therapies and activities this shift.

## 2022-08-18 NOTE — PROGRESS NOTES
Hospital Medicine Daily Progress Note    Date of Service  8/18/2022    Chief Complaint  Jess Root is a 62 y.o. female admitted 8/10/2022 with stroke    Hospital Course  Ms. Root is a 62-year-old female with hypertension, paroxysmal atrial fibrillation and hypothyroid secondary to thyroidectomy.  On 8/2 she had an acute onset of left hand weakness and an MRI showed bilateral strokes largest in the right frontal parietal lobe and a smaller in the left frontal lobe.  Source was likely her atrial fibrillation.  Patient was switched from aspirin to Xarelto.  She was admitted 8/10/2022 to [facility] for further physical rehabilitation.    Interval Problem Update  8/16/2022 per vitals the patient is afebrile.  She has heart rates ranging from 55-70 overnight.  Oxygen saturations on room air are stable and satisfactory.  Blood pressure mildly elevated with a reading of 140/70.  Patient is alert speech clear.  Has some ongoing left-sided and weakness and deficit in fine motor skills.  States some left-sided numbness on her face.  8/17: Ms. Root was evaluated and examined at rehab. Ms. Root states she felt dizzy in the shower and laid down in the bed afterwards. The morning blood pressure was 116/64. She drank more water today and has felt better since. Her blood pressure has been appropriate afterwards and went up to 137/73. She remains on max dose Lipitor 80 mg.   8/18: Ms. Root was seen and evaluated at rehab. Her blood pressure this morning is 103/75 and a few hours later 157/89. The dose of Lipitor will be dropped to 40 mg daily down from 80.     I have discussed this patient's plan of care and discharge plan at IDT rounds today with Case Management, Nursing, Nursing leadership, and other members of the IDT team.    Consultants/Specialty  physiatry I discussed with Dr. Gastelum    Code Status  Full Code        Review of Systems  Review of Systems   Constitutional:  Negative for chills and fever.   Respiratory:   Negative for shortness of breath.    Cardiovascular:  Negative for chest pain.   Gastrointestinal:         Eating well   Neurological:  Negative for dizziness, loss of consciousness and headaches.   All other systems reviewed and are negative.     Physical Exam  Temp:  [36.7 °C (98 °F)-36.8 °C (98.2 °F)] 36.7 °C (98 °F)  Pulse:  [63-78] 64  Resp:  [16-18] 18  BP: (103-137)/(67-84) 103/75  SpO2:  [94 %-96 %] 95 %    Physical Exam  Vitals and nursing note reviewed.   Constitutional:       General: She is not in acute distress.     Appearance: She is not ill-appearing.   HENT:      Head: Normocephalic and atraumatic.      Mouth/Throat:      Mouth: Mucous membranes are dry.   Eyes:      General: No scleral icterus.     Conjunctiva/sclera: Conjunctivae normal.   Cardiovascular:      Rate and Rhythm: Normal rate and regular rhythm.   Abdominal:      General: There is no distension.      Tenderness: There is no abdominal tenderness.      Comments: Right abd bruise with underlying hematoma   Musculoskeletal:      Cervical back: Normal range of motion and neck supple.      Right lower leg: No edema.      Left lower leg: No edema.   Skin:     General: Skin is warm and dry.      Coloration: Skin is pale.   Neurological:      Mental Status: She is alert and oriented to person, place, and time.   Psychiatric:         Mood and Affect: Mood normal.         Behavior: Behavior normal.       Fluids    Intake/Output Summary (Last 24 hours) at 8/18/2022 0714  Last data filed at 8/17/2022 2011  Gross per 24 hour   Intake 800 ml   Output --   Net 800 ml         Laboratory                        Imaging  No orders to display        Assessment/Plan  * Ischemic stroke (HCC)- (present on admission)  Assessment & Plan  MRI: showed bilateral strokes, largest in right frontal  parietal lobe in the SOPHIA territory, smaller in the left frontal lobe, also in the SOPHIA territory  S/P tPA (at [facility])  On Xarelto due to etiology likely A.  fib  Physical rehabilitation ongoing. Ongoing left hand weakness and decreased dexterity.   Lipitor 80 mg (last LDL in Epic was 167 January 2022) though reasonable to put her on 40 mg daily given the possible side effects and fact that the stroke was from afib.  Her blood pressure has varied significantly.    Erythrocytosis- (present on admission)  Assessment & Plan  Improved  8/14 Hgb: 15.3  Suspect 2nd to dehydration  Encouraging fluid intake      Essential hypertension- (present on admission)  Assessment & Plan  She had been on Toprol 25 mg daily  Metoprolol 12.5 mg twice a day with parameters  She has had wide fluctuations of her blood pressure (103 systolic in the morning and 157 systolic a few hours later).     Paroxysmal atrial fibrillation (HCC)- (present on admission)  Assessment & Plan  Continue metoprolol 12.5 mg BID (home dose is Toprol 25 mg daily).  Rivaroxaban 20 mg for anticoagulation      Hypothyroidism- (present on admission)  Assessment & Plan  (8/12/22): TSH: 3.08 ; FT4: 2.08  On Synthroid --> dose decreased recently currently on 100 mcg previously on 112 mcg  Note: pt has an elevated FT4 over the past couple years  Note: needs repeat TFT's in about 6 weeks       VTE prophylaxis: therapeutic anticoagulation with Xarelto    I have performed a physical exam and reviewed and updated ROS and Plan today (8/18/2022). In review of yesterday's note (8/17/2022), there are no changes except as documented above.

## 2022-08-18 NOTE — DISCHARGE PLANNING
CM has LM x2 for benefits dept (Anjana) to please call this CM to fight out if she has any FMLA, long term or short term insurance.  Awaiting response.

## 2022-08-19 PROCEDURE — 99232 SBSQ HOSP IP/OBS MODERATE 35: CPT | Performed by: HOSPITALIST

## 2022-08-19 PROCEDURE — 97112 NEUROMUSCULAR REEDUCATION: CPT

## 2022-08-19 PROCEDURE — 97130 THER IVNTJ EA ADDL 15 MIN: CPT

## 2022-08-19 PROCEDURE — 700102 HCHG RX REV CODE 250 W/ 637 OVERRIDE(OP): Performed by: PHYSICAL MEDICINE & REHABILITATION

## 2022-08-19 PROCEDURE — 97116 GAIT TRAINING THERAPY: CPT

## 2022-08-19 PROCEDURE — A9270 NON-COVERED ITEM OR SERVICE: HCPCS | Performed by: HOSPITALIST

## 2022-08-19 PROCEDURE — 770010 HCHG ROOM/CARE - REHAB SEMI PRIVAT*

## 2022-08-19 PROCEDURE — 94760 N-INVAS EAR/PLS OXIMETRY 1: CPT

## 2022-08-19 PROCEDURE — A9270 NON-COVERED ITEM OR SERVICE: HCPCS | Performed by: PHYSICAL MEDICINE & REHABILITATION

## 2022-08-19 PROCEDURE — 99232 SBSQ HOSP IP/OBS MODERATE 35: CPT | Performed by: PHYSICAL MEDICINE & REHABILITATION

## 2022-08-19 PROCEDURE — 700102 HCHG RX REV CODE 250 W/ 637 OVERRIDE(OP): Performed by: HOSPITALIST

## 2022-08-19 PROCEDURE — 97530 THERAPEUTIC ACTIVITIES: CPT

## 2022-08-19 PROCEDURE — 97129 THER IVNTJ 1ST 15 MIN: CPT

## 2022-08-19 RX ORDER — METOPROLOL SUCCINATE 25 MG/1
25 TABLET, EXTENDED RELEASE ORAL
Status: DISCONTINUED | OUTPATIENT
Start: 2022-08-20 | End: 2022-08-22 | Stop reason: HOSPADM

## 2022-08-19 RX ADMIN — OMEPRAZOLE 20 MG: 20 CAPSULE, DELAYED RELEASE ORAL at 07:57

## 2022-08-19 RX ADMIN — RIVAROXABAN 20 MG: 20 TABLET, FILM COATED ORAL at 17:33

## 2022-08-19 RX ADMIN — LEVOTHYROXINE SODIUM 100 MCG: 0.05 TABLET ORAL at 06:07

## 2022-08-19 RX ADMIN — METOPROLOL TARTRATE 12.5 MG: 25 TABLET, FILM COATED ORAL at 06:06

## 2022-08-19 RX ADMIN — METOPROLOL TARTRATE 12.5 MG: 25 TABLET, FILM COATED ORAL at 17:33

## 2022-08-19 RX ADMIN — SENNOSIDES AND DOCUSATE SODIUM 1 TABLET: 50; 8.6 TABLET ORAL at 08:02

## 2022-08-19 RX ADMIN — ATORVASTATIN CALCIUM 40 MG: 40 TABLET, FILM COATED ORAL at 20:46

## 2022-08-19 ASSESSMENT — ENCOUNTER SYMPTOMS
DIZZINESS: 0
ROS GI COMMENTS: EATING WELL
FEVER: 0
SHORTNESS OF BREATH: 0
LOSS OF CONSCIOUSNESS: 0
HEADACHES: 0
CHILLS: 0

## 2022-08-19 ASSESSMENT — GAIT ASSESSMENTS
DEVIATION: NO DEVIATION
GAIT LEVEL OF ASSIST: SUPERVISED
DISTANCE (FEET): 550

## 2022-08-19 NOTE — FLOWSHEET NOTE
08/19/22 1226   Events/Summary/Plan   Events/Summary/Plan o2 spot check   Vital Signs   Pulse 66   Respiration 14   Pulse Oximetry 94 %   $ Pulse Oximetry (Spot Check) Yes   Respiratory Assessment   Level of Consciousness Alert   Chest Exam   Work Of Breathing / Effort Within Normal Limits   Oxygen   O2 Delivery Device None - Room Air

## 2022-08-19 NOTE — PROGRESS NOTES
Rehab Progress Note     Chief Complaint: Ischemic stroke    Interval Events (Subjective)  Patient seen in the dining damon for breakfast.  She reports she had a high blood pressure reading this morning (146/73) which she is concerned about.  Overall, patient's blood pressure has been well controlled with systolic pressures in the upper 120s and low 130s.  We discussed that I do not feel this 1 reading is anything to be alarmed about.  Patient reports that she takes long-acting metoprolol at home which she would like to resume on discharge.  Otherwise, no complaints.    Objective:  VITAL SIGNS: /70   Pulse (!) 59   Temp 36.8 °C (98.2 °F) (Oral)   Resp 18   Ht 1.524 m (5')   Wt 63.5 kg (139 lb 15.9 oz)   SpO2 93%   BMI 27.34 kg/m²     Physical Exam:  Vitals: /70   Pulse (!) 59   Temp 36.8 °C (98.2 °F) (Oral)   Resp 18   Ht 1.524 m (5')   Wt 63.5 kg (139 lb 15.9 oz)   SpO2 93%   Gen: NAD  Head: NC/AT  Eyes/ Nose/ Mouth: PERRLA, moist mucous membranes  Cardio: Irregularly irregular rhythm, good distal perfusion, warm extremities  Pulm: normal respiratory effort, no cyanosis   Abd: Soft NTND, negative borborygmi   Ext: No peripheral edema. No calf tenderness. No clubbing.    Tone: no spasticity noted, no cogwheeling noted    No results found for this or any previous visit (from the past 72 hour(s)).    Current Facility-Administered Medications   Medication Frequency    atorvastatin (LIPITOR) tablet 40 mg Q EVENING    senna-docusate (PERICOLACE or SENOKOT S) 8.6-50 MG per tablet 1 Tablet DAILY    And    polyethylene glycol/lytes (MIRALAX) PACKET 1 Packet QDAY PRN    And    magnesium hydroxide (MILK OF MAGNESIA) suspension 30 mL QDAY PRN    And    bisacodyl (DULCOLAX) suppository 10 mg QDAY PRN    metoprolol tartrate (LOPRESSOR) tablet 12.5 mg TWICE DAILY    levothyroxine (SYNTHROID) tablet 100 mcg AM ES    hydrOXYzine HCl (ATARAX) tablet 50 mg Q6HRS PRN    melatonin tablet 3 mg HS PRN     Respiratory Therapy Consult Continuous RT    Pharmacy Consult Request ...Pain Management Review 1 Each PHARMACY TO DOSE    acetaminophen (Tylenol) tablet 650 mg Q4HRS PRN    lactulose 20 GM/30ML solution 30 mL QDAY PRN    docusate sodium (ENEMEEZ) enema 283 mg QDAY PRN    omeprazole (PRILOSEC) capsule 20 mg QAM AC    artificial tears ophthalmic solution 1 Drop PRN    mag hydrox-al hydrox-simeth (MAALOX PLUS ES or MYLANTA DS) suspension 20 mL Q2HRS PRN    ondansetron (ZOFRAN ODT) dispertab 4 mg 4X/DAY PRN    Or    ondansetron (ZOFRAN) syringe/vial injection 4 mg 4X/DAY PRN    traZODone (DESYREL) tablet 50 mg QHS PRN    sodium chloride (OCEAN) 0.65 % nasal spray 2 [place] PRN    hydrALAZINE (APRESOLINE) tablet 10 mg Q8HRS PRN    rivaroxaban (XARELTO) tablet 20 mg PM MEAL    midazolam (VERSED) 5 mg/mL (1 mL vial) PRN       Orders Placed This Encounter   Procedures    Diet Order Diet: Regular     Standing Status:   Standing     Number of Occurrences:   1     Order Specific Question:   Diet:     Answer:   Regular [1]       Assessment:  Active Hospital Problems    Diagnosis     *Ischemic stroke (HCC)     Erythrocytosis     Paroxysmal atrial fibrillation (HCC)     Essential hypertension     Hypothyroidism        Medical Decision Making and Plan:  Bilateral strokes  Right frontal parietal  Left frontal  Left hemiparesis, arm >leg  Non-dominant  Cardioembolic  Continue full rehab program  PT/OT/SLP, 1 hr each discipline, 5 days per week     Xarelto  Statin     Outpatient follow-up with [place] neurology     Emotional lability  8/15 In setting of right frontal stroke  Consider psychology evaluation     Paroxysmal atrial fibrillation  Atrial fibrillation with RVR 8/11  Metoprolol  Xarelto  Checked EKG, RVR with rate 157 8/11 -likely secondary to her morning dose metoprolol held due to mild bradycardia and hypotension  8/16: 1 episode more elevated blood pressure at systolic 140 compared to prior after reduction in metoprolol  from 25 mg twice daily to 12.5 mg twice daily on 8/15.  8/18: BP and HR stable  8/19: Solitary elevation in blood pressure this morning to 146 systolic.  Patient would like to resume long-acting metoprolol on discharge    Hypertension  Hypotension 8/11  Metoprolol  Lisinopril, discontinued  8/16: 1x /70.  Monitor.  Metoprolol recently reduced.  8/18: BP stable.     Hypothyroidism  History of thyroidectomy  Levothyroxine  Per patient dose recently decreased  Checked TSH, normal TSH, elevated free T4  Dose of levothyroxine decreased  Appreciate hospitalist assistance     GI prophylaxis  Omeprazole     Oral thrush  Continue nystatin     Bowel program  Continue bowel medications  Last BM 8/18 - loose  Reduced Senokot from 2 tablets twice daily to 1 tablet daily per patient preference     Bladder program  Voiding volitionally     DVT prophylaxis  Xarelto       Total time:  26 minutes.  I spent greater than 50% of the time for patient care and coordination on this date, including unit/floor time, and face-to-face time with the patient as per assessment and plan above.    Eduardo Amezquita, DO   Physical Medicine and Rehabilitation

## 2022-08-19 NOTE — CARE PLAN
Problem: Knowledge Deficit - Standard  Goal: Patient and family/care givers will demonstrate understanding of plan of care, disease process/condition, diagnostic tests and medications  Outcome: Progressing     Problem: Fall Risk - Rehab  Goal: Patient will remain free from falls  Outcome: Progressing   The patient is Stable - Low risk of patient condition declining or worsening    Shift Goals  Clinical Goals: Safety  Patient Goals: sleep well    Progress made toward(s) clinical / shift goals:  Patient is resting in bed denies pain or discomfort    Patient is not progressing towards the following goals:

## 2022-08-19 NOTE — THERAPY
Occupational Therapy  Daily Treatment     Patient Name: Jess Root  Age:  62 y.o., Sex:  female  Medical Record #: [id number]  Today's Date: 8/19/2022     Precautions  Precautions: (P) Fall Risk  Comments: monitor BP and HR         Subjective    Patient in room and ready for OT.       Objective       08/19/22 1301   OT Charge Group   OT Neuromuscular Re-education / Balance 2   OT Therapy Activity 2   OT Total Time Spent   OT Individual Total Time Spent (Mins) 60   Precautions   Precautions Fall Risk   Pain 0 - 10 Group   Therapist Pain Assessment 0   Functional Level of Assist   Lower Body Dressing Supervision   Lower Body Dressing Description   (to don shoes with laces)   Bed, Chair, Wheelchair Transfer Independent   Fine Motor / Dexterity    Fine Motor / Dexterity Interventions L UE FMC/GMC tasks picking up 22 beanbags on tabletop and placing in bucket out in space in all directions with focus on exaggerated finger extension.  Also picked up one inch wooden blocks with pad to pad grasp and placed in bucket out in space in all directions.   IADL Treatments   IADL Treatments Other   Comments simulated grocery shopping activity pushing grocery cart around gym while retrieving items from high/low areas, cabinets and from tight spots with supervision and good safety.  Used B UE to open a paper grocery bag and fill with grocery items, then removed from bag and placed on shelf with supervision.   Bed Mobility    Sit to Supine Independent   Interdisciplinary Plan of Care Collaboration   Patient Position at End of Therapy In Bed     Issued FMC HEP for L hand/fingers.     Functional mobility in room, hallways, gyms with supervision and no AD.    Assessment    Slight improvement in pad to pad grasp with L hand today during activities.  Supervised with good safety and balance during simulated grocery shopping task and grocery bag packing activity (patient is a  at grocery store).  Ready to d/c home on Monday.     Strengths: Able to follow instructions, Alert and oriented, Good carryover of learning, Independent prior level of function, Manages pain appropriately, Motivated for self care and independence, Pleasant and cooperative, Willingly participates in therapeutic activities  Barriers: Fatigue, Decreased endurance, Hemiparesis, Home accessibility, Impaired activity tolerance, Impaired balance, Impaired functional cognition    Plan    ADL routine Saturday or Sunday for d/c [facility]'s (d/c home Monday); L hand FMC/strengthening        Occupational Therapy Goals (Active)       Problem: Dressing       Dates: Start: 08/15/22         Goal: STG-Within one week, patient will dress UB with setup and supervision       Dates: Start: 08/15/22            Goal: STG-Within one week, patient will dress LB with setup and supervision       Dates: Start: 08/15/22               Problem: Grooming       Dates: Start: 08/11/22         Goal: STG-Within one week, patient will complete grooming with set-up A overall utilizing DME/AE as needed.       Dates: Start: 08/11/22         Goal Note filed on 08/15/22 1134 by Sheng Brown OT/VLADIMIR Farmer A                 Problem: OT Long Term Goals       Dates: Start: 08/11/22         Goal: LTG-By discharge, patient will complete basic self care tasks Mod I overall utilizing DME/AE as needed.       Dates: Start: 08/11/22            Goal: LTG-By discharge, patient will perform bathroom transfers with Mod I overall utilizing DME/AE as needed.       Dates: Start: 08/11/22               Problem: Toileting       Dates: Start: 08/15/22         Goal: STG-Within one week, patient will complete toileting tasks with supervision       Dates: Start: 08/15/22

## 2022-08-19 NOTE — THERAPY
"Physical Therapy   Daily Treatment     Patient Name: Jess Root  Age:  62 y.o., Sex:  female  Medical Record #: [id number]  Today's Date: 8/19/2022     Precautions  Precautions: Fall Risk  Comments: monitor BP and HR    Subjective    Patient is agreeable to participate, states she just met with someone from [facility] and is interested in participating with them.     Objective       08/19/22 1401   PT Charge Group   PT Gait Training 2   PT Neuromuscular Re-Education / Balance 2   PT Total Time Spent   PT Individual Total Time Spent (Mins) 60   Gait Functional Level of Assist    Gait Level Of Assist Supervised   Assistive Device None   Distance (Feet) 550   # of Times Distance was Traveled 1   Deviation No deviation   Stairs Functional Level of Assist   Level of Assist with Stairs Standby Assist   # of Stairs Climbed 6   Stairs Description Extra time;Hand rails;Supervision for safety   Sitting Lower Body Exercises   Nustep Resistance Level 1   Comments 5 mins, attending to LUE, total of 135 steps   Neuro-Muscular Treatments   Neuro-Muscular Treatments Anterior weight shift;Compensatory Strategies;Postural Changes;Sensory Stimuli;Sequencing;Tactile Cuing;Tapping;Verbal Cuing;Weight Shift Right;Weight Shift Left   Comments obstacle course with stepping over/ around/ sideways over obstacles, picking up items/ throwing to targets, toe taps on cones with no UE support, single limb stance on foam, \"grapevine\" weight shifts with LLE behind/ in front of RLE and RLE behind/ in front of LLE with little/ no UE support   Interdisciplinary Plan of Care Collaboration   Patient Position at End of Therapy In Bed;Call Light within Reach;Tray Table within Reach;Phone within Reach       Assessment    Patient has made significant functional gains, is still relatively limited on NuStep. Will benefit from further cardiovascular endurance activities.    Strengths: Able to follow instructions, Effective communication skills, " Independent prior level of function, Motivated for self care and independence, Pleasant and cooperative, Willingly participates in therapeutic activities, Good carryover of learning, Good insight into deficits/needs    Plan    Cardiovascular endurance, dynamic balance tasks    Passport items to be completed:  Get in/out of bed safely, in/out of a vehicle, safely use mobility device, walk or wheel around home/community, navigate up and down stairs, show how to get up/down from the ground, ensure home is accessible, demonstrate HEP, complete caregiver training    Physical Therapy Problems (Active)       Problem: Balance       Dates: Start: 08/11/22         Goal: STG-Within one week, patient will maintain dynamic standing on foam with reaching tasks       Dates: Start: 08/11/22         Goal Note filed on 08/15/22 1239 by Julius Hilliard, PT       FT w/ trunk turning and UE reaching cones both side x4 w/ CGA, several LOBs.                 Problem: Mobility       Dates: Start: 08/11/22         Goal: STG-Within one week, patient will ambulate up/down flight of stairs with supervision assist       Dates: Start: 08/11/22         Goal Note filed on 08/15/22 1239 by Julius Hilliard, PT       CGA for 14 stairs at this time              Goal: STG-Within one week, patient will ambulate 300 feet at IND level without an AD.       Dates: Start: 08/15/22               Problem: Mobility Transfers       Dates: Start: 08/15/22         Goal: STG-Within one week, patient will transfer bed to chair at IND level without an AD.       Dates: Start: 08/15/22               Problem: PT-Long Term Goals       Dates: Start: 08/11/22         Goal: LTG-By discharge, patient will tolerate standing x 15 minutes while performing standing dynamic tasks       Dates: Start: 08/11/22            Goal: LTG-By discharge, patient will ambulate up/down flight of stairs independently       Dates: Start: 08/11/22            Goal: LTG-By discharge,  patient will transfer in/out of a car independently.       Dates: Start: 08/11/22

## 2022-08-19 NOTE — THERAPY
Speech Language Pathology  Daily Treatment     Patient Name: Jess Root  Age:  62 y.o., Sex:  female  Medical Record #: [id number]  Today's Date: 8/19/2022     Precautions  Precautions: Fall Risk  Comments: monitor BP and HR    Subjective    Pt pleasant and cooperative     Objective       08/19/22 0903   Treatment Charges   SLP Cognitive Skill Development First 15 Minutes 1   SLP Cognitive Skill Development Additional 15 Minutes 3   SLP Total Time Spent   SLP Individual Total Time Spent (Mins) 60       Assessment    Pt completed medication sort with 100% accuracy indep X2. Pt presented with attention task to locate target word THE, pt indep located 10/15 targets, increased to 14/15 with additional review and MOD A, 15/15 with MAX A.    Strengths: Able to follow instructions, Pleasant and cooperative, Willingly participates in therapeutic activities, Motivated for self care and independence, Independent prior level of function  Barriers: Impaired functional cognition    Plan    Cont to address attention and executive functioning skills    Speech Therapy Problems (Active)       Problem: Memory STGs       Dates: Start: 08/12/22         Goal: STG-Within one week, patient will learn and implement functional memory strategies to assist in recall of information related to stroke and daily tasks with 80% accuracy provided MIN A.       Dates: Start: 08/12/22               Problem: Problem Solving STGs       Dates: Start: 08/12/22         Goal: STG-Within one week, patient will complete functional medication management and financial management tasks with 80% accuracy provided SPV.       Dates: Start: 08/12/22               Problem: Speech/Swallowing LTGs       Dates: Start: 08/11/22         Goal: LTG-By discharge, patient will safely swallow regular textures with no overt s/sx of asp/pen noted with 100% accuracy provided MOD I       Dates: Start: 08/11/22            Goal: LTG-By discharge, patient will complete  functional problem solving and recall information with 80% accuracy provided MOD I       Dates: Start: 08/12/22               Problem: Swallowing STGs       Dates: Start: 08/11/22

## 2022-08-20 PROCEDURE — A9270 NON-COVERED ITEM OR SERVICE: HCPCS | Performed by: PHYSICAL MEDICINE & REHABILITATION

## 2022-08-20 PROCEDURE — 700102 HCHG RX REV CODE 250 W/ 637 OVERRIDE(OP): Performed by: PHYSICAL MEDICINE & REHABILITATION

## 2022-08-20 PROCEDURE — A9270 NON-COVERED ITEM OR SERVICE: HCPCS | Performed by: HOSPITALIST

## 2022-08-20 PROCEDURE — 97129 THER IVNTJ 1ST 15 MIN: CPT

## 2022-08-20 PROCEDURE — 99231 SBSQ HOSP IP/OBS SF/LOW 25: CPT | Performed by: HOSPITALIST

## 2022-08-20 PROCEDURE — 770010 HCHG ROOM/CARE - REHAB SEMI PRIVAT*

## 2022-08-20 PROCEDURE — 700102 HCHG RX REV CODE 250 W/ 637 OVERRIDE(OP): Performed by: HOSPITALIST

## 2022-08-20 PROCEDURE — 97130 THER IVNTJ EA ADDL 15 MIN: CPT

## 2022-08-20 RX ADMIN — OMEPRAZOLE 20 MG: 20 CAPSULE, DELAYED RELEASE ORAL at 08:39

## 2022-08-20 RX ADMIN — SENNOSIDES AND DOCUSATE SODIUM 1 TABLET: 50; 8.6 TABLET ORAL at 08:39

## 2022-08-20 RX ADMIN — ATORVASTATIN CALCIUM 40 MG: 40 TABLET, FILM COATED ORAL at 21:04

## 2022-08-20 RX ADMIN — ACETAMINOPHEN 650 MG: 325 TABLET ORAL at 17:18

## 2022-08-20 RX ADMIN — METOPROLOL SUCCINATE 25 MG: 25 TABLET, EXTENDED RELEASE ORAL at 05:26

## 2022-08-20 RX ADMIN — RIVAROXABAN 20 MG: 20 TABLET, FILM COATED ORAL at 17:16

## 2022-08-20 RX ADMIN — LEVOTHYROXINE SODIUM 100 MCG: 0.05 TABLET ORAL at 05:24

## 2022-08-20 ASSESSMENT — ENCOUNTER SYMPTOMS
SHORTNESS OF BREATH: 0
LOSS OF CONSCIOUSNESS: 0
DIZZINESS: 0
ROS GI COMMENTS: EATING WELL
FEVER: 0
HEADACHES: 0
CHILLS: 0

## 2022-08-20 ASSESSMENT — PATIENT HEALTH QUESTIONNAIRE - PHQ9
2. FEELING DOWN, DEPRESSED, IRRITABLE, OR HOPELESS: NOT AT ALL
1. LITTLE INTEREST OR PLEASURE IN DOING THINGS: NOT AT ALL
SUM OF ALL RESPONSES TO PHQ9 QUESTIONS 1 AND 2: 0
1. LITTLE INTEREST OR PLEASURE IN DOING THINGS: NOT AT ALL
2. FEELING DOWN, DEPRESSED, IRRITABLE, OR HOPELESS: NOT AT ALL
SUM OF ALL RESPONSES TO PHQ9 QUESTIONS 1 AND 2: 0

## 2022-08-20 ASSESSMENT — PAIN DESCRIPTION - PAIN TYPE
TYPE: ACUTE PAIN
TYPE: ACUTE PAIN

## 2022-08-20 NOTE — THERAPY
Speech Language Pathology  Daily Treatment     Patient Name: Jess Root  Age:  62 y.o., Sex:  female  Medical Record #: [id number]  Today's Date: 8/20/2022     Precautions  Precautions: Fall Risk  Comments: monitor BP and HR    Subjective    Pt pleasant and cooperative.     Objective       08/20/22 1333   Treatment Charges   SLP Cognitive Skill Development First 15 Minutes 1   SLP Cognitive Skill Development Additional 15 Minutes 3   SLP Total Time Spent   SLP Individual Total Time Spent (Mins) 60       Assessment    Pt presented with making change task. Task consisted of 12 questions in which pt required 45 min to complete. Pt completed 5/12 indep, 4 problems she completed part of the calculation however did not complete final step to figure out the change amount, 3 questions resulting in incorrect math calculations overall.     Strengths: Able to follow instructions, Pleasant and cooperative, Willingly participates in therapeutic activities, Motivated for self care and independence, Independent prior level of function  Barriers: Impaired functional cognition    Plan    Complete outcome assessment and passport please!    Speech Therapy Problems (Active)       Problem: Memory STGs       Dates: Start: 08/12/22         Goal: STG-Within one week, patient will learn and implement functional memory strategies to assist in recall of information related to stroke and daily tasks with 80% accuracy provided MIN A.       Dates: Start: 08/12/22               Problem: Problem Solving STGs       Dates: Start: 08/12/22         Goal: STG-Within one week, patient will complete functional medication management and financial management tasks with 80% accuracy provided SPV.       Dates: Start: 08/12/22               Problem: Speech/Swallowing LTGs       Dates: Start: 08/11/22         Goal: LTG-By discharge, patient will safely swallow regular textures with no overt s/sx of asp/pen noted with 100% accuracy provided MOD I        Dates: Start: 08/11/22            Goal: LTG-By discharge, patient will complete functional problem solving and recall information with 80% accuracy provided MOD I       Dates: Start: 08/12/22               Problem: Swallowing STGs       Dates: Start: 08/11/22

## 2022-08-20 NOTE — PROGRESS NOTES
Hospital Medicine Daily Progress Note    Date of Service  8/20/2022    Chief Complaint  Jess Root is a 62 y.o. female admitted 8/10/2022 with stroke    Hospital Course  Ms. Root is a 62-year-old female with hypertension, paroxysmal atrial fibrillation and hypothyroid secondary to thyroidectomy.  On 8/2 she had an acute onset of left hand weakness and an MRI showed bilateral strokes largest in the right frontal parietal lobe and a smaller in the left frontal lobe.  Source was likely her atrial fibrillation.  Patient was switched from aspirin to Xarelto.  She was admitted 8/10/2022 to [facility] for further physical rehabilitation.    Interval Problem Update  8/16/2022 per vitals the patient is afebrile.  She has heart rates ranging from 55-70 overnight.  Oxygen saturations on room air are stable and satisfactory.  Blood pressure mildly elevated with a reading of 140/70.  Patient is alert speech clear.  Has some ongoing left-sided and weakness and deficit in fine motor skills.  States some left-sided numbness on her face.  8/17: Ms. Root was evaluated and examined at rehab. Ms. Root states she felt dizzy in the shower and laid down in the bed afterwards. The morning blood pressure was 116/64. She drank more water today and has felt better since. Her blood pressure has been appropriate afterwards and went up to 137/73. She remains on max dose Lipitor 80 mg.   8/18: Ms. Root was seen and evaluated at rehab. Her blood pressure this morning is 103/75 and a few hours later 157/89. The dose of Lipitor will be dropped to 40 mg daily down from 80.   8/19:  doing well with therapy.  BP stable.   8/20:  BP improved on metoprolol XL instead of bid dosing.  Left hand improving in motor,  strength improving daily.    I have discussed this patient's plan of care and discharge plan at IDT rounds today with Case Management, Nursing, Nursing leadership, and other members of the IDT  team.    Consultants/Specialty  physiatry I discussed with Dr. Gastelum    Code Status  Full Code        Review of Systems  Review of Systems   Constitutional:  Negative for chills and fever.   Respiratory:  Negative for shortness of breath.    Cardiovascular:  Negative for chest pain.   Gastrointestinal:         Eating well   Neurological:  Negative for dizziness, loss of consciousness and headaches.   All other systems reviewed and are negative.     Physical Exam  Temp:  [36.7 °C (98.1 °F)] 36.7 °C (98.1 °F)  Pulse:  [61-80] 61  Resp:  [18] 18  BP: (102-127)/(62-66) 127/62  SpO2:  [96 %] 96 %    Physical Exam  Vitals and nursing note reviewed.   Constitutional:       General: She is not in acute distress.     Appearance: She is not ill-appearing.   HENT:      Head: Normocephalic and atraumatic.      Mouth/Throat:      Mouth: Mucous membranes are dry.   Eyes:      General: No scleral icterus.     Conjunctiva/sclera: Conjunctivae normal.   Cardiovascular:      Rate and Rhythm: Normal rate and regular rhythm.   Abdominal:      General: There is no distension.      Tenderness: There is no abdominal tenderness.      Comments: Right abd bruise with underlying hematoma   Musculoskeletal:      Cervical back: Normal range of motion and neck supple.      Right lower leg: No edema.      Left lower leg: No edema.   Skin:     General: Skin is warm and dry.      Coloration: Skin is pale.   Neurological:      Mental Status: She is alert and oriented to person, place, and time.   Psychiatric:         Mood and Affect: Mood normal.         Behavior: Behavior normal.       Fluids    Intake/Output Summary (Last 24 hours) at 8/20/2022 1430  Last data filed at 8/20/2022 1200  Gross per 24 hour   Intake 530 ml   Output --   Net 530 ml       Laboratory                        Imaging  No orders to display        Assessment/Plan  * Ischemic stroke (HCC)- (present on admission)  Assessment & Plan  MRI: showed bilateral strokes, largest in  right frontal  parietal lobe in the SOPHIA territory, smaller in the left frontal lobe, also in the SOPHIA territory  S[facility]PA (at [facility])  On Xarelto due to etiology likely A. fib  Physical rehabilitation ongoing. Ongoing left hand weakness and decreased dexterity.   Lipitor 80 mg (last LDL in Epic was 167 January 2022) though reasonable to put her on 40 mg daily given the possible side effects and fact that the stroke was from afib.  Her blood pressure has varied significantly.    Erythrocytosis- (present on admission)  Assessment & Plan  Improved  8/14 Hgb: 15.3  Suspect 2nd to dehydration  Encouraging fluid intake      Essential hypertension- (present on admission)  Assessment & Plan    She has had wide fluctuations of her blood pressure (103 systolic in the morning and 157 systolic a few hours later).   8/20 restarted toprol XL 25mg daily instead of 12.5 bid with improvement of BP control.    Paroxysmal atrial fibrillation (HCC)- (present on admission)  Assessment & Plan  toprol XL 25mg daily.  Rivaroxaban 20 mg for anticoagulation      Hypothyroidism- (present on admission)  Assessment & Plan  (8/12/22): TSH: 3.08 ; FT4: 2.08  On Synthroid --> dose decreased recently currently on 100 mcg previously on 112 mcg  Note: pt has an elevated FT4 over the past couple years  Note: needs repeat TFT's in about 6 weeks       VTE prophylaxis: therapeutic anticoagulation with Xarelto    I have performed a physical exam and reviewed and updated ROS and Plan today (8/20/2022). In review of yesterday's note (8/19/2022), there are no changes except as documented above.

## 2022-08-20 NOTE — PROGRESS NOTES
Received shift report and assumed care of patient.  Patient awake, calm and stable, currently positioned in bed for comfort and safety; call light within reach.  Denies pain or discomfort at this time.  Will continue to monitor.

## 2022-08-20 NOTE — CARE PLAN
The patient is Stable - Low risk of patient condition declining or worsening      Problem: Fall Risk - Rehab  Goal: Patient will remain free from falls  Note: Pt with good safety awareness, now Mod I in room with no assistive device.     Problem: Pain - Standard  Goal: Alleviation of pain or a reduction in pain to the patient’s comfort goal  Note: Patient able to verbalize needs.  Denies pain or discomfort this shift and no s/s same noted.  Will continue to monitor.

## 2022-08-20 NOTE — CARE PLAN
Problem: Fall Risk - Rehab  Goal: Patient will remain free from falls  Outcome: Progressing  Note: Orly Smith Fall risk Assessment Score: 7    Low fall risk interventions   - Call light within reach   - Yellow  socks   - Belongings within reach   - Bed in the lowest position        Problem: Psychosocial  Goal: Patient's level of anxiety will decrease  Outcome: Progressing   The patient is Stable - Low risk of patient condition declining or worsening    Shift Goals  Clinical Goals: Safety  Patient Goals: sleep well    Progress made toward(s) clinical / shift goals:  Pt free from fall and injury.

## 2022-08-21 PROCEDURE — 97130 THER IVNTJ EA ADDL 15 MIN: CPT

## 2022-08-21 PROCEDURE — 97112 NEUROMUSCULAR REEDUCATION: CPT

## 2022-08-21 PROCEDURE — A9270 NON-COVERED ITEM OR SERVICE: HCPCS | Performed by: HOSPITALIST

## 2022-08-21 PROCEDURE — 700102 HCHG RX REV CODE 250 W/ 637 OVERRIDE(OP): Performed by: HOSPITALIST

## 2022-08-21 PROCEDURE — 97530 THERAPEUTIC ACTIVITIES: CPT

## 2022-08-21 PROCEDURE — 770010 HCHG ROOM/CARE - REHAB SEMI PRIVAT*

## 2022-08-21 PROCEDURE — A9270 NON-COVERED ITEM OR SERVICE: HCPCS | Performed by: PHYSICAL MEDICINE & REHABILITATION

## 2022-08-21 PROCEDURE — 97535 SELF CARE MNGMENT TRAINING: CPT

## 2022-08-21 PROCEDURE — 700102 HCHG RX REV CODE 250 W/ 637 OVERRIDE(OP): Performed by: PHYSICAL MEDICINE & REHABILITATION

## 2022-08-21 PROCEDURE — 99233 SBSQ HOSP IP/OBS HIGH 50: CPT | Performed by: PHYSICAL MEDICINE & REHABILITATION

## 2022-08-21 PROCEDURE — 97129 THER IVNTJ 1ST 15 MIN: CPT

## 2022-08-21 RX ORDER — ATORVASTATIN CALCIUM 40 MG/1
40 TABLET, FILM COATED ORAL EVERY EVENING
Qty: 30 TABLET | Refills: 2 | Status: SHIPPED | OUTPATIENT
Start: 2022-08-21 | End: 2022-12-01 | Stop reason: SDUPTHER

## 2022-08-21 RX ORDER — LEVOTHYROXINE SODIUM 0.1 MG/1
TABLET ORAL
Qty: 30 TABLET | Refills: 2 | Status: SHIPPED
Start: 2022-08-21 | End: 2022-10-26

## 2022-08-21 RX ADMIN — OMEPRAZOLE 20 MG: 20 CAPSULE, DELAYED RELEASE ORAL at 08:17

## 2022-08-21 RX ADMIN — LEVOTHYROXINE SODIUM 100 MCG: 0.05 TABLET ORAL at 05:38

## 2022-08-21 RX ADMIN — ATORVASTATIN CALCIUM 40 MG: 40 TABLET, FILM COATED ORAL at 21:30

## 2022-08-21 RX ADMIN — RIVAROXABAN 20 MG: 20 TABLET, FILM COATED ORAL at 17:20

## 2022-08-21 RX ADMIN — METOPROLOL SUCCINATE 25 MG: 25 TABLET, EXTENDED RELEASE ORAL at 05:38

## 2022-08-21 ASSESSMENT — BALANCE ASSESSMENTS
STANDING UNSUPPORTED WITH EYES CLOSED: 4
LONG VERSION TOTAL SCORE (MAX 56): 52
PICK UP OBJECT FROM THE FLOOR FROM A STANDING POSITION: 3
STANDING TO SITTING: 4
STANDING UNSUPPORTED: 4
TURN 360 DEGREES: 3
LONG VERSION TOTAL SCORE (MAX 56): 52
STANDING ON ONE LEG: 2
SITTING UNSUPPORTED: 4
STANDING UNSUPPORTED WITH FEET TOGETHER: 4
STANDING UNSUPPORTED ONE FOOT IN FRONT: 4
LOOK OVER LEFT AND RIGHT SHOULDERS WHILE STANDING: 4
REACHING FORWARD WITH OUTSTRETCHED ARM WHILE STANDING: 4
PLACE ALTERNATE FOOT ON STEP OR STOOL WHILE STANDING UNSUPPORTED: 4
TRANSFERS: 4
SITTING TO STANDING: 4

## 2022-08-21 ASSESSMENT — ACTIVITIES OF DAILY LIVING (ADL)
TOILET_TRANSFER_DESCRIPTION: GRAB BAR
TOILETING_LEVEL_OF_ASSIST_DESCRIPTION: GRAB BAR;INCREASED TIME

## 2022-08-21 ASSESSMENT — PATIENT HEALTH QUESTIONNAIRE - PHQ9
2. FEELING DOWN, DEPRESSED, IRRITABLE, OR HOPELESS: NOT AT ALL
1. LITTLE INTEREST OR PLEASURE IN DOING THINGS: NOT AT ALL
SUM OF ALL RESPONSES TO PHQ9 QUESTIONS 1 AND 2: 0

## 2022-08-21 ASSESSMENT — PAIN DESCRIPTION - PAIN TYPE
TYPE: ACUTE PAIN
TYPE: ACUTE PAIN

## 2022-08-21 ASSESSMENT — GAIT ASSESSMENTS
DISTANCE (FEET): 600
DEVIATION: NO DEVIATION
GAIT LEVEL OF ASSIST: INDEPENDENT

## 2022-08-21 ASSESSMENT — FIBROSIS 4 INDEX: FIB4 SCORE: 0.59

## 2022-08-21 NOTE — CARE PLAN
The patient is Stable - Low risk of patient condition declining or worsening    Shift Goals  Clinical Goals: Safety  Patient Goals: sleep well    Progress made toward(s) clinical / shift goals:    Problem: Fall Risk - Rehab  Goal: Patient will remain free from falls  Outcome: Progressing   Patient demonstrates good safety technique this shift.  Asks for assistance when needed and does not attempt self transfer.  Able to verbalize needs.  Will continue to monitor.

## 2022-08-21 NOTE — DISCHARGE INSTRUCTIONS
[facility] NURSING DISCHARGE INSTRUCTIONS    Blood Pressure: 133/57  Weight: 63 kg (138 lb 14.2 oz)  Nursing recommendations for Jess Root at time of discharge are as follows:  Client verbalized understanding of all discharge instructions and prescriptions.     Review all your home medications and newly ordered medications with your doctor and/or pharmacist. Follow medication instructions as directed by your doctor and/or pharmacist.    Pain Management:   Discharge Pain Medication Instructions:  Comfort Goal: Sleep Comfortably, Comfort with Movement, Stay Alert  Notify your primary care provider if pain is unrelieved with these measures, if the pain is new, or increased in intensity.    Discharge Skin Characteristics: Warm, Dry  Discharge Skin Exam: Clear     Skin / Wound Care Instructions: Please contact your primary care physician for any change in skin integrity.     If You Have Surgical Incisions / Wounds:  Monitor surgical site(s) for signs of increased swelling, redness or symptoms of drainage from the site or fever as this could indicate signs and symptoms of infection. If these symptoms are noted, notifiy your primary care provider.      Discharge Safety Instructions: No Supervision Needed     Discharge Safety Concerns: No Concerns Noted  The interdisciplinary team has made recommendation that you do not require supervision in the house due to demonstration of safety with ADL's and IADLS and problem solving skills  Anti-embolic stockings are not required to increase circulation to the lower extremities.    Discharge Diet: Regular diet     Discharge Liquids: Thin Liquids  Discharge Bowel Function: Continent  Please contact your primary care physician for any changes in bowel habits.  Discharge Bowel Program:    Discharge Bladder Function: Continent  Discharge Urinary Devices: None      Nursing Discharge Plan:        Case Management Discharge Instructions:   Discharge Location:     Agency Name/Address/Phone:    Home Health:    Outpatient Services:    DME Provider/Phone:    Medical Equipment Ordered:    Prescription Faxed to:        Discharge Medication Instructions:  Below are the medications your physician expects you to take upon discharge:      Fall Prevention in the Home, Adult  Falls can cause injuries and can affect people from all age groups. There are many simple things that you can do to make your home safe and to help prevent falls. Ask for help when making these changes, if needed.  What actions can I take to prevent falls?  General instructions  Use good lighting in all rooms. Replace any light bulbs that burn out.  Turn on lights if it is dark. Use night-lights.  Place frequently used items in easy-to-reach places. Lower the shelves around your home if necessary.  Set up furniture so that there are clear paths around it. Avoid moving your furniture around.  Remove throw rugs and other tripping hazards from the floor.  Avoid walking on wet floors.  Fix any uneven floor surfaces.  Add color or contrast paint or tape to grab bars and handrails in your home. Place contrasting color strips on the first and last steps of stairways.  When you use a stepladder, make sure that it is completely opened and that the sides are firmly locked. Have someone hold the ladder while you are using it. Do not climb a closed stepladder.  Be aware of any and all pets.  What can I do in the bathroom?         Keep the floor dry. Immediately clean up any water that spills onto the floor.  Remove soap buildup in the tub or shower on a regular basis.  Use non-skid mats or decals on the floor of the tub or shower.  Attach bath mats securely with double-sided, non-slip rug tape.  If you need to sit down while you are in the shower, use a plastic, non-slip stool.  Install grab bars by the toilet and in the tub and shower. Do not use towel bars as grab bars.  What can I do in the bedroom?  Make sure that a  bedside light is easy to reach.  Do not use oversized bedding that drapes onto the floor.  Have a firm chair that has side arms to use for getting dressed.  What can I do in the kitchen?  Clean up any spills right away.  If you need to reach for something above you, use a sturdy step stool that has a grab bar.  Keep electrical cables out of the way.  Do not use floor polish or wax that makes floors slippery. If you must use wax, make sure that it is non-skid floor wax.  What can I do in the stairways?  Do not leave any items on the stairs.  Make sure that you have a light switch at the top of the stairs and the bottom of the stairs. Have them installed if you do not have them.  Make sure that there are handrails on both sides of the stairs. Fix handrails that are broken or loose. Make sure that handrails are as long as the stairways.  Install non-slip stair treads on all stairs in your home.  Avoid having throw rugs at the top or bottom of stairways, or secure the rugs with carpet tape to prevent them from moving.  Choose a carpet design that does not hide the edge of steps on the stairway.  Check any carpeting to make sure that it is firmly attached to the stairs. Fix any carpet that is loose or worn.  What can I do on the outside of my home?  Use bright outdoor lighting.  Regularly repair the edges of walkways and driveways and fix any cracks.  Remove high doorway thresholds.  Trim any shrubbery on the main path into your home.  Regularly check that handrails are securely fastened and in good repair. Both sides of any steps should have handrails.  Install guardrails along the edges of any raised decks or porches.  Clear walkways of debris and clutter, including tools and rocks.  Have leaves, snow, and ice cleared regularly.  Use sand or salt on walkways during winter months.  In the garage, clean up any spills right away, including grease or oil spills.  What other actions can I take?  Wear closed-toe shoes that  fit well and support your feet. Wear shoes that have rubber soles or low heels.  Use mobility aids as needed, such as canes, walkers, scooters, and crutches.  Review your medicines with your health care provider. Some medicines can cause dizziness or changes in blood pressure, which increase your risk of falling.  Talk with your health care provider about other ways that you can decrease your risk of falls. This may include working with a physical therapist or  to improve your strength, balance, and endurance.  Where to find more information  Centers for Disease Control and Prevention, [place]ESADI: https://www.cdc.gov  National [place] on Aging: https://wt5sprq.eros.nih.gov  Contact a health care provider if:  You are afraid of falling at home.  You feel weak, drowsy, or dizzy at home.  You fall at home.  Summary  There are many simple things that you can do to make your home safe and to help prevent falls.  Ways to make your home safe include removing tripping hazards and installing grab bars in the bathroom.  Ask for help when making these changes in your home.  This information is not intended to replace advice given to you by your health care provider. Make sure you discuss any questions you have with your health care provider.  Document Released: 12/08/2003 Document Revised: 11/30/2018 Document Reviewed: 08/02/2018  [employer] Patient Education © 2020 [employer]      Depression / Suicide Risk    As you are discharged from this [facility] facility, it is important to learn how to keep safe from harming yourself.    Recognize the warning signs:  Abrupt changes in personality, positive or negative- including increase in energy   Giving away possessions  Change in eating patterns- significant weight changes-  positive or negative  Change in sleeping patterns- unable to sleep or sleeping all the time   Unwillingness or inability to communicate  Depression  Unusual sadness, discouragement and loneliness  Talk  of wanting to die  Neglect of personal appearance   Rebelliousness- reckless behavior  Withdrawal from people/activities they love  Confusion- inability to concentrate     If you or a loved one observes any of these behaviors or has concerns about self-harm, here's what you can do:  Talk about it- your feelings and reasons for harming yourself  Remove any means that you might use to hurt yourself (examples: pills, rope, extension cords, firearm)  Get professional help from the community (Mental Health, Substance Abuse, psychological counseling)  Do not be alone:Call your Safe Contact- someone whom you trust who will be there for you.  Call your local CRISIS HOTLINE 318-5858 or 359-173-1658  Call your local Children's Mobile Crisis Response Team [place] (204) 864-1326 or www.[employer]  Call the toll free National Suicide Prevention Hotlines   National Suicide Prevention Lifeline 662-051-KLFF (6675)  [employer] Line Network 800-SUICIDE (501-6495)    Physical Therapy Discharge Instructions for Jess Root    8/21/2022    Level of Assist Required for Ambulation: No Assist on Flat Surfaces, No Assist on Curbs, No Assist on Stairs  Distance Patient May Ambulate: 600 ft or until fatigued  Device Recommended for Ambulation: None  Level of Assist Required for Transfers: Requires No Assist  Device Recommended for Transfers: None  Home Exercise Program: Refer to Home Exercise Program Handout for Details    Speech Therapy Discharge Instructions for Jess Root    8/21/2022    Diet: Regular (7), Thin (0)    Strategies to Help Improve Your Memory   Your speech therapist can work with you to develop strategies to help you remember new information. There are 2 main types of strategies to help your memory: internal reminders and external reminders.     Internal Reminders     Rehearsal: retelling yourself information you just learned, or restating it out loud in your own words.   Repetition: saying the  same information over and over, either silently or out loud.   Clarification: asking someone else to repeat or rephrase information.   Chunking: grouping items together to reduce the number of items to remember, such as grouping 7-digit phone numbers into 2 chunks, one with 3 numbers and the other with 4 numbers.   Rhyming: making a rhyme out of important information.   Acronyms or alphabet cueing: creating a letter for each word you want to remember, or vice versa. One example is using the sentence “Every Good Boy Does Fine” to remember that the lines of a treble staff in music are the notes E, G, B, D, and F.   Imagery (also called visualization): creating pictures of the information in your mind.   Association: linking old information or habits with the new, such as remembering to take your medicine every night at the same time that you brush your teeth.   Personal meaning: making the new information meaningful or emotionally important to you in some way.     External Reminders     Using a paper or electronic calendar or day planner.   Setting timers or alarms to remind you to do something.   Writing down reminders such as to-do lists, shopping lists, and project outlines.   Recording new information with a voice recorder.   Using a medicine organizing tool.   Creating specific, permanent places for important items. One example is always putting your keys, wallet, and cell phone in the same place every time you get home.     Great progress Jess, best of luck in you continued recovery! -Melissa, SLP

## 2022-08-21 NOTE — CARE PLAN
Problem: PT-Long Term Goals  Goal: LTG-By discharge, patient will tolerate standing x 15 minutes while performing standing dynamic tasks  Outcome: Met  Goal: LTG-By discharge, patient will ambulate up/down flight of stairs independently  Outcome: Met  Goal: LTG-By discharge, patient will transfer in/out of a car independently.  Outcome: Met

## 2022-08-21 NOTE — THERAPY
Speech Language Pathology  Daily Treatment     Patient Name: Jess Root  Age:  62 y.o., Sex:  female  Medical Record #: [id number]  Today's Date: 8/21/2022     Precautions  Precautions: Fall Risk  Comments: monitor BP and HR    Subjective    Patient was willing to participate in ST.      Objective       08/21/22 1302   Treatment Charges   SLP Cognitive Skill Development First 15 Minutes 1   SLP Cognitive Skill Development Additional 15 Minutes 3   SLP Total Time Spent   SLP Individual Total Time Spent (Mins) 60   Outcome Measures   Outcome Measures Utilized SCCAN   SCCAN (Scales of Cognitive and Communicative Ability for Neurorehabilitation)   Oral Expression - Raw Score 19   Oral Expression - Scale Performance Score 100   Orientation - Raw Score 12   Orientation - Scale Performance Score 100   Memory - Raw Score 18   Memory - Scale Performance Score 95   Speech Comprehension - Raw Score 13   Speech Comprehension - Scale Performance Score 100   Reading Comprehension - Raw Score 11   Reading Comprehension - Scale Performance Score 92   Writing - Raw Score 6   Writing - Scale Performance Score 86   Attention - Raw Score 15   Attention - Scale Performance Score 94   Problem Solving - Raw Score 23   Problem Solving - Scale Performance Score 100   SCCAN Total Raw Score 91   SCCAN Degree of Severity Typical Functioning       Assessment    Patient completed outcomes assessment.  Patient participated in SCCAN with a final raw score of 91 indicating cognition WFL. Patient demonstrated improvement in all cognitive domains.       Strengths: Able to follow instructions, Pleasant and cooperative, Willingly participates in therapeutic activities, Motivated for self care and independence, Independent prior level of function  Barriers: Impaired functional cognition    Plan    Prepare for d/c home.     Speech Therapy Problems (Active)       Problem: Memory STGs       Dates: Start: 08/12/22         Goal: STG-Within one week,  patient will learn and implement functional memory strategies to assist in recall of information related to stroke and daily tasks with 80% accuracy provided MIN RONAN       Dates: Start: 08/12/22               Problem: Problem Solving STGs       Dates: Start: 08/12/22         Goal: STG-Within one week, patient will complete functional medication management and financial management tasks with 80% accuracy provided SPV.       Dates: Start: 08/12/22               Problem: Speech/Swallowing LTGs       Dates: Start: 08/11/22         Goal: LTG-By discharge, patient will safely swallow regular textures with no overt s/sx of asp/pen noted with 100% accuracy provided MOD I       Dates: Start: 08/11/22            Goal: LTG-By discharge, patient will complete functional problem solving and recall information with 80% accuracy provided MOD I       Dates: Start: 08/12/22               Problem: Swallowing STGs       Dates: Start: 08/11/22

## 2022-08-21 NOTE — PROGRESS NOTES
Received shift report regarding patient and assumed care. Patient is sleeping calm and stable, currently positioned in bed for comfort and safety; call light within reach. No s/s of distress noted. Will continue to monitor.

## 2022-08-21 NOTE — DISCHARGE SUMMARY
Rehab Discharge Summary    Admission Date: 8/10/2022    Discharge Date: 8/22/2022    Attending Provider: Cheryl Sebastian MD    Admission Diagnosis:   Active Hospital Problems    Diagnosis     *Ischemic stroke (HCC)     Erythrocytosis     Paroxysmal atrial fibrillation (HCC)     Essential hypertension     Hypothyroidism        Discharge Diagnosis:  Active Hospital Problems    Diagnosis     *Ischemic stroke (HCC)     Erythrocytosis     Paroxysmal atrial fibrillation (HCC)     Essential hypertension     Hypothyroidism        HPI per H&P:  The patient is a 62 y.o. female with a past medical history of paroxysmal atrial fibrillation, hypothyroidism secondary to thyroidectomy; now admitted for acute inpatient rehabilitation with severe functional debility after an acute stroke.       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     She had negative Head CT and CTA for LVO. She received tPA. MRI showed bilateral strokes, largest in right frontal  parietal lobe in the SOPHIA territory, smaller in the left frontal lobe, also in the SOPHIA territory. Due to multiple vascular territories and history of atrial fibrillation, patient was switched from aspirin to Xarelto. HgbA1c 5.3, , ECHO EF 65-70%. She will need outpatient follow up with both neurology and cardiology.    Patient was admitted to [facility] on 8/10/2022.     Hospital Course by Problem List:  Bilateral strokes  Right frontal parietal  Left frontal  Left hemiparesis, arm >leg, improved  Non-dominant  Cardioembolic    Xarelto  Statin     Outpatient follow-up with [place] neurology     Emotional lability, resolved  8/15 In setting of right frontal stroke  Seen by neuro-psychology      Paroxysmal atrial fibrillation  Atrial fibrillation with RVR 8/11, resolved  Metoprolol, changed back to  SR  Xarelto    Hypertension  Hypotension 8/11, resolved, 2/2 to a-fib with RVR  Metoprolol  Lisinopril, discontinued    Hypothyroidism  History of thyroidectomy  Levothyroxine  Per patient dose recently decreased  Checked TSH, normal TSH, elevated free T4  Dose of levothyroxine decreased  Needs outpatient follow up with PCP for thyroid labs in 4-6 weeks     GI prophylaxis  Omeprazole     Oral thrush, resolved  Completed nystatin     Bowel program  Continue bowel medications     Bladder program  Voiding volitionally     DVT prophylaxis  Xarelto    Functional Status at Discharge  Eating:  Modified Independent  Eating Description:  Set-up of equipment or meal/tube feeding  Grooming:  Supervision, Standing  Grooming Description:  Standing at sink (to wash hands)  Bathing:  Supervision  Bathing Description:  Grab bar, Hand held shower, Tub bench, Increased time, Set-up of equipment, Supervision for safety, Verbal cueing (Pt able to reach all parts in seated, standing for rear only at SPV level due to increased dizziness with standing.)  Upper Body Dressing:  Supervision  Upper Body Dressing Description:  Supervision for safety (SPV for clothing retrieval standing from bed to closet then sitting EOB. SPV-Mod I for don/doff of pullover shirt and buttoned house coat)  Lower Body Dressing:  Supervision  Lower Body Dressing Description:   (to don shoes with laces)     Walk:  Supervised  Distance Walked:  550  Number of Times Distance Was Traveled:  1  Assistive Device:  None  Gait Deviation:  No deviation  Wheelchair:     Distance Propelled:      Wheelchair Description:     Stairs Standby Assist  Stairs Description Extra time, Hand rails, Supervision for safety     Comprehension:  Modified Independent  Comprehension Description:  Glasses  Expression:  Modified Independent  Expression Description:     Social Interaction:  Modified Independent  Social Interaction Description:     Problem Solving:  Supervision  Problem Solving  Description:     Memory:  Moderate Assist  Memory Description:          Raciel MENSAH M.D., personally performed a complete drug regimen review and no potential clinically significant medication issues were identified.   Discharge Medication:     Medication List        START taking these medications        Instructions   atorvastatin 40 MG Tabs  Commonly known as: LIPITOR   Take 1 Tablet by mouth every evening.  Dose: 40 mg     rivaroxaban 20 MG Tabs tablet  Commonly known as: XARELTO   Take 1 Tablet by mouth with dinner.  Dose: 20 mg            CHANGE how you take these medications        Instructions   levothyroxine 100 MCG Tabs  What changed: medication strength  Commonly known as: SYNTHROID   TAKE ONE TABLET BY MOUTH EVERY MORNING ON AN EMPTY STOMACH            CONTINUE taking these medications        Instructions   metoprolol SR 25 MG Tb24  Commonly known as: TOPROL XL   Take 1 Tablet by mouth every day.  Dose: 25 mg            STOP taking these medications      aspirin 325 MG Tabs  Commonly known as: ASA     furosemide 20 MG Tabs  Commonly known as: LASIX     potassium chloride ER 10 MEQ tablet  Commonly known as: KLOR-CON              Discharge Diet:  Regular    Discharge Activity:  As tolerated     Disposition:  Patient to discharge home with family support and community resources.     Equipment:  None    Follow-up & Discharge Instructions:  Follow up with your primary care provider (PCP) within 7-10 days of discharge to review your medications and take over your care.     If you develop chest pain, fever, chills, change in neurologic function (weakness, sensation changes, vision changes), or other concerning sxs, seek immediate medical attention or call 911.      Condition on Discharge:  Good    More than 35 minutes was spent on discharging this patient, including face-to-face time, prescription management, and the dictation of this note.    Cheryl Sebastian MD  Physical Medicine and  Rehabilitation      Date of Service: 8/23/2022

## 2022-08-21 NOTE — THERAPY
Physical Therapy   Daily Treatment     Patient Name: Jess Root  Age:  62 y.o., Sex:  female  Medical Record #: [id number]  Today's Date: 8/21/2022     Precautions  Precautions: Fall Risk  Comments: (P) monitor BP and HR    Subjective    Pt feels prepared for D/C. Her son will stay 2 nights, then she will go to a friend's house temporarily.      Objective       08/21/22 0931   PT Charge Group   PT Neuromuscular Re-Education / Balance 2   PT Therapeutic Activities 2   PT Total Time Spent   PT Individual Total Time Spent (Mins) 60   Precautions   Comments monitor BP and HR   Gait Functional Level of Assist    Gait Level Of Assist Independent   Assistive Device None  (indoor/ outdoor)   Distance (Feet) 600  (+ 200 ft x 2)   # of Times Distance was Traveled 1   Deviation No deviation   Wheelchair Functional Level of Assist   Wheelchair Assist   (N/A)   Stairs Functional Level of Assist   Level of Assist with Stairs Modified Independent   # of Stairs Climbed 12   Stairs Description   (1 HR x 4 stairs, next 8 without HR)   Transfer Functional Level of Assist   Bed, Chair, Wheelchair Transfer Independent   Bed Mobility    Supine to Sit Independent   Sit to Supine Independent   Sit to Stand Independent   Scooting Independent   Rolling Independent   Neuro-Muscular Treatments   Neuro-Muscular Treatments Weight Shift Left;Weight Shift Right   Comments curb with no AD indep. Reviewed home safety/ fall prevention handouts; provided for home   Outcome Measures   Outcome Measures Utilized Montoya Balance Scale   Montoya Balance Scale   Sitting Unsupported (Score 0-4) 4   Change Of Positon: Sitting To Standing (Score 0-4) 4   Change Of Positon: Standing To Sitting (Score 0-4) 4   Transfers (Score 0-4) 4   Standing Unsupported (Score 0-4) 4   Standing With Eyes Closed (Score 0-4) 4   Standing With Feet Together (Score 0-4) 4   Tandem Standing (Score 0-4) 4   Standing On One Leg (Score 0-4) 2   Turning Trunk (Feet Fixed) (Score 0-4) 4    Retrieving Objects From Floor (Score 0-4) 3   Turning 360 Degrees (Score 0-4) 3   Stool Stepping (Score 0-4) 4   Reaching Forward While Standing (Score 0-4) 4   Ham Balance Total Score (0-56) 52   Interdisciplinary Plan of Care Collaboration   IDT Collaboration with  Physician   Collaboration Comments POC, CLOF   Strengths & Barriers   Strengths Able to follow instructions;Adequate strength;Motivated for self care and independence;Independent prior level of function   Barriers Decreased endurance  (Intermittent dizziness)       Assessment    Pt scored low fall risk on HAM, and demonstrated good safety awareness throughout session, indoors/ outdoors.     Strengths: (P) Able to follow instructions, Adequate strength, Motivated for self care and independence, Independent prior level of function  Barriers: (P) Decreased endurance (Intermittent dizziness)    Plan    D/C patient tomorrow.     Passport items to be completed:  Get in/out of bed safely, in/out of a vehicle, safely use mobility device, walk or wheel around home/community, navigate up and down stairs, show how to get up/down from the ground, ensure home is accessible, demonstrate HEP, complete caregiver training    Physical Therapy Problems (Active)       Problem: Balance       Dates: Start: 08/11/22         Goal: STG-Within one week, patient will maintain dynamic standing on foam with reaching tasks       Dates: Start: 08/11/22         Goal Note filed on 08/15/22 1239 by Julius Hilliard, PT       FT w/ trunk turning and UE reaching cones both side x4 w/ CGA, several LOBs.                 Problem: Mobility       Dates: Start: 08/11/22         Goal: STG-Within one week, patient will ambulate up/down flight of stairs with supervision assist       Dates: Start: 08/11/22         Goal Note filed on 08/15/22 1239 by Julius Hilliard, PT       CGA for 14 stairs at this time              Goal: STG-Within one week, patient will ambulate 300 feet at IND  level without an AD.       Dates: Start: 08/15/22               Problem: Mobility Transfers       Dates: Start: 08/15/22         Goal: STG-Within one week, patient will transfer bed to chair at IND level without an AD.       Dates: Start: 08/15/22               Problem: PT-Long Term Goals       Dates: Start: 08/11/22         Goal: LTG-By discharge, patient will tolerate standing x 15 minutes while performing standing dynamic tasks       Dates: Start: 08/11/22            Goal: LTG-By discharge, patient will ambulate up/down flight of stairs independently       Dates: Start: 08/11/22            Goal: LTG-By discharge, patient will transfer in/out of a car independently.       Dates: Start: 08/11/22

## 2022-08-21 NOTE — PROGRESS NOTES
Rehab Progress Note     Encounter Date: 8/21/2022    CC: Decreased mobility, bilateral strokes    Interval Events (Subjective)  Patient sitting up in therapy gym. She reports therapy is going well. She thinks she is ready to go tomorrow. She has questions about anticoagulation and tomorrow. Discussed would send in medications today so that she is ready for tomorrow. Denies NVD. Denies SOB.    Objective:  VITAL SIGNS: /71   Pulse 63   Temp 36.6 °C (97.8 °F) (Oral)   Resp 16   Ht 1.524 m (5')   Wt 63.5 kg (139 lb 15.9 oz)   SpO2 98%   BMI 27.34 kg/m²   Gen: NAD  Psych: Mood and affect appropriate  CV: RRR, no edema  Resp: CTAB, no upper airway sounds  Abd: NTND  Neuro: AOx4, following commands    No results found for this or any previous visit (from the past 72 hour(s)).    Current Facility-Administered Medications   Medication Frequency    metoprolol SR (TOPROL XL) tablet 25 mg Q DAY    atorvastatin (LIPITOR) tablet 40 mg Q EVENING    senna-docusate (PERICOLACE or SENOKOT S) 8.6-50 MG per tablet 1 Tablet DAILY    And    polyethylene glycol/lytes (MIRALAX) PACKET 1 Packet QDAY PRN    And    magnesium hydroxide (MILK OF MAGNESIA) suspension 30 mL QDAY PRN    And    bisacodyl (DULCOLAX) suppository 10 mg QDAY PRN    levothyroxine (SYNTHROID) tablet 100 mcg AM ES    hydrOXYzine HCl (ATARAX) tablet 50 mg Q6HRS PRN    melatonin tablet 3 mg HS PRN    Respiratory Therapy Consult Continuous RT    Pharmacy Consult Request ...Pain Management Review 1 Each PHARMACY TO DOSE    acetaminophen (Tylenol) tablet 650 mg Q4HRS PRN    lactulose 20 GM/30ML solution 30 mL QDAY PRN    docusate sodium (ENEMEEZ) enema 283 mg QDAY PRN    omeprazole (PRILOSEC) capsule 20 mg QAM AC    artificial tears ophthalmic solution 1 Drop PRN    mag hydrox-al hydrox-simeth (MAALOX PLUS ES or MYLANTA DS) suspension 20 mL Q2HRS PRN    ondansetron (ZOFRAN ODT) dispertab 4 mg 4X/DAY PRN    Or    ondansetron (ZOFRAN) syringe/vial injection 4 mg  4X/DAY PRN    traZODone (DESYREL) tablet 50 mg QHS PRN    sodium chloride (OCEAN) 0.65 % nasal spray 2 [place] PRN    hydrALAZINE (APRESOLINE) tablet 10 mg Q8HRS PRN    rivaroxaban (XARELTO) tablet 20 mg PM MEAL    midazolam (VERSED) 5 mg/mL (1 mL vial) PRN       Orders Placed This Encounter   Procedures    Diet Order Diet: Regular     Standing Status:   Standing     Number of Occurrences:   1     Order Specific Question:   Diet:     Answer:   Regular [1]       Assessment:  Active Hospital Problems    Diagnosis     *Ischemic stroke (HCC)     Erythrocytosis     Paroxysmal atrial fibrillation (HCC)     Essential hypertension     Hypothyroidism        Medical Decision Making and Plan:  Bilateral strokes  Right frontal parietal  Left frontal  Left hemiparesis, arm >leg  Non-dominant  Cardioembolic  Continue full rehab program  PT/OT/SLP, 1 hr each discipline, 5 days per week  Xarelto  Statin     Outpatient follow-up with [place] neurology     Emotional lability  8/15 In setting of right frontal stroke  Consider psychology evaluation     Paroxysmal atrial fibrillation  Atrial fibrillation with RVR 8/11  Metoprolol  Xarelto  Checked EKG, RVR with rate 157 8/11 -likely secondary to her morning dose metoprolol held due to mild bradycardia and hypotension  8/16: 1 episode more elevated blood pressure at systolic 140 compared to prior after reduction in metoprolol from 25 mg twice daily to 12.5 mg twice daily on 8/15.  8/18: BP and HR stable  8/19: Solitary elevation in blood pressure this morning to 146 systolic.  Patient would like to resume long-acting metoprolol on discharge  Restarted on Metoprolol SR    Hypertension  Hypotension 8/11  Metoprolol  Lisinopril, discontinued  8/16: 1x /70.  Monitor.  Metoprolol recently reduced.  8/18: BP stable.     Hypothyroidism  History of thyroidectomy  Levothyroxine  Per patient dose recently decreased  Checked TSH, normal TSH, elevated free T4  Dose of levothyroxine  decreased  Appreciate hospitalist assistance     GI prophylaxis  Omeprazole     Oral thrush  Continue nystatin     Bowel program  Continue bowel medications  Last BM 8/18 - loose  Reduced Senokot from 2 tablets twice daily to 1 tablet daily per patient preference     Bladder program  Voiding volitionally     DVT prophylaxis  Xarelto    Total time:  36 minutes.  I spent greater than 50% of the time for patient care and coordination on this date, including unit/floor time, and face-to-face time with the patient as per assessment and plan above.  Discussion included discharge planning, discharge medications, no senna-docusate at discharge, continue Xarelto and long acting metoprolol.     Raciel Hogan M.D.

## 2022-08-21 NOTE — CARE PLAN
"The patient is Stable - Low risk of patient condition declining or worsening    Shift Goals  Clinical Goals: Safety  Patient Goals: sleep well    Problem: Fall Risk - Rehab  Goal: Patient will remain free from falls  Outcome: Progressing  Note: Orly Smith Fall risk Assessment Score: 17    High fall risk Interventions   - Bed and strip alarm   - Yellow sign by the door   - Yellow wrist band \"Fall risk\"  - Room near to the nurse station  - Do not leave patient unattended in the bathroom  - Fall risk education provided  - Mod I     Problem: Skin Integrity  Goal: Patient's skin integrity will be maintained or improve  Outcome: Progressing  Note:   Anthony Score: 18    Patient's skin remains intact and free from new or accidental injury this shift; no s/s of infection. RN wound protocol checked. Encouraged hydration and educated about the importance of nutrition to keep skin integrity. Will continue to monitor.       "

## 2022-08-21 NOTE — THERAPY
"Occupational Therapy  Daily Treatment     Patient Name: Jess Root  Age:  62 y.o., Sex:  female  Medical Record #: [id number]  Today's Date: 8/21/2022     Precautions  Precautions: Fall Risk  Comments: (P) monitor BP and HR         Subjective    Pt was found awake, resting in bed, but said \"I keep learning a lot from you guys about how to do things.\"     Objective       08/21/22 0701   OT Charge Group   Charges Yes   OT Self Care / ADL 3   OT Therapy Activity 1   OT Total Time Spent   OT Individual Total Time Spent (Mins) 60   Precautions   Comments monitor BP and HR   Cognition    Level of Consciousness Alert   Sleep/Wake Cycle   Sleep & Rest Resting   Functional Level of Assist   Grooming Supervision;Standing   Grooming Description Standing at sink   Bathing Supervision   Bathing Description Adaptive equipment;Supervision for safety;Hand held shower;Grab bar   Upper Body Dressing Independent   Upper Body Dressing Description Increased time   Lower Body Dressing Supervision   Toileting Modified Independent   Toileting Description Grab bar;Increased time   Bed, Chair, Wheelchair Transfer Independent   Toilet Transfers Modified Independent   Toilet Transfer Description Grab bar   Tub / Shower Transfers Supervised   Hand Strengthening   Hand Strengthening Left ;Left Pinch;Gross Grasp Left   Comment   (req'd consistent verbal cues for LUE to complete tasks vs compensatory movement stabilizing objects on various objects)   IADL Treatments   Meal Preparation   (supervised to prepare cup of coffee(requested) as functional activity with VC for utilizing LUE/hand to stabilize coffee cup for safety)   Balance   Sitting Balance (Static) Good   Sitting Balance (Dynamic) Good   Standing Balance (Static) Good   Standing Balance (Dynamic) Fair +   Bed Mobility    Supine to Sit Supervised   Sit to Supine Independent   Interdisciplinary Plan of Care Collaboration   Patient Position at End of Therapy Seated  (OT transferred " pt to T-Dine)       Assessment    Pt demo good participation with OT focused on ADL training and functional independence. She demonstrated good safety awareness, continues to require cues to utilize LUE/hand, and would continue to benefit from services to improve LUE function. She was left happy, with all needs met, no further questions, and at T-Dine.     Strengths: Able to follow instructions, Alert and oriented, Good carryover of learning, Independent prior level of function, Manages pain appropriately, Motivated for self care and independence, Pleasant and cooperative, Willingly participates in therapeutic activities  Barriers: Fatigue, Decreased endurance, Hemiparesis, Home accessibility, Impaired activity tolerance, Impaired balance, Impaired functional cognition    Plan    D/C 8/22/22        Occupational Therapy Goals (Active)       There are no active problems.

## 2022-08-22 VITALS
SYSTOLIC BLOOD PRESSURE: 142 MMHG | OXYGEN SATURATION: 94 % | HEART RATE: 65 BPM | WEIGHT: 138.89 LBS | BODY MASS INDEX: 27.27 KG/M2 | RESPIRATION RATE: 20 BRPM | DIASTOLIC BLOOD PRESSURE: 73 MMHG | TEMPERATURE: 97.6 F | HEIGHT: 60 IN

## 2022-08-22 PROCEDURE — 700102 HCHG RX REV CODE 250 W/ 637 OVERRIDE(OP): Performed by: PHYSICAL MEDICINE & REHABILITATION

## 2022-08-22 PROCEDURE — 700102 HCHG RX REV CODE 250 W/ 637 OVERRIDE(OP): Performed by: HOSPITALIST

## 2022-08-22 PROCEDURE — 99239 HOSP IP/OBS DSCHRG MGMT >30: CPT | Performed by: PHYSICAL MEDICINE & REHABILITATION

## 2022-08-22 PROCEDURE — A9270 NON-COVERED ITEM OR SERVICE: HCPCS | Performed by: PHYSICAL MEDICINE & REHABILITATION

## 2022-08-22 PROCEDURE — A9270 NON-COVERED ITEM OR SERVICE: HCPCS | Performed by: HOSPITALIST

## 2022-08-22 RX ADMIN — METOPROLOL SUCCINATE 25 MG: 25 TABLET, EXTENDED RELEASE ORAL at 05:39

## 2022-08-22 RX ADMIN — SENNOSIDES AND DOCUSATE SODIUM 1 TABLET: 50; 8.6 TABLET ORAL at 08:26

## 2022-08-22 RX ADMIN — OMEPRAZOLE 20 MG: 20 CAPSULE, DELAYED RELEASE ORAL at 08:26

## 2022-08-22 RX ADMIN — LEVOTHYROXINE SODIUM 100 MCG: 0.05 TABLET ORAL at 06:39

## 2022-08-22 NOTE — CARE PLAN
The patient is Stable - Low risk of patient condition declining or worsening    Shift Goals  Clinical Goals: safety, skin integrity  Patient Goals: rest    Problem: Fall Risk - Rehab  Goal: Patient will remain free from falls  Outcome: Progressing  Note: Orly Smith Fall risk Assessment Score: 7    Low fall risk interventions   - Call light within reach   - Yellow  socks   - Belongings within reach   - Bed in the lowest position            Problem: Skin Integrity  Goal: Patient's skin integrity will be maintained or improve  Outcome: Progressing  Note:   Anthony Score: 18    Patient's skin remains intact and free from new or accidental injury this shift; no s/s of infection. RN wound protocol checked. Encouraged hydration and educated about the importance of nutrition to keep skin integrity. Will continue to monitor.

## 2022-08-22 NOTE — DISCHARGE PLANNING
Spoke with patient Friday. She will be moving to her friends [place] home: Address [address] [address]. [phone].     I received paperwork from [place], will have MD sign and give it back to patient.     This CM will call patient to discuss follow ups.

## 2022-08-22 NOTE — CARE PLAN
Problem: Knowledge Deficit - Standard  Goal: Patient and family/care givers will demonstrate understanding of plan of care, disease process/condition, diagnostic tests and medications  Outcome: Met     Problem: Neuro Status  Goal: Neuro status will remain stable or improve  Outcome: Met     Problem: Mobility  Goal: Patient's capacity to carry out activities will improve  Outcome: Met     Problem: Fall Risk - Rehab  Goal: Patient will remain free from falls  Outcome: Met     Problem: Psychosocial  Goal: Patient's level of anxiety will decrease  Outcome: Met  Goal: Patient's ability to verbalize feelings about condition will improve  Outcome: Met  Goal: Spiritual and cultural needs incorporated into hospitalization  Outcome: Met     Problem: Bladder / Voiding  Goal: Patient will establish and maintain regular urinary output  Outcome: Met  Goal: Patient will establish and maintain bladder regimen  Outcome: Met     Problem: Skin Integrity  Goal: Patient's skin integrity will be maintained or improve  Outcome: Met     Problem: Pain - Standard  Goal: Alleviation of pain or a reduction in pain to the patient’s comfort goal  Outcome: Met   The patient is Stable - Low risk of patient condition declining or worsening

## 2022-08-22 NOTE — DISCHARGE PLANNING
Case Management Discharge Instructions     Follow-up Information:     [facility]  [address]  [address]  [place] [facility] [address]  [phone]  Follow up     [facility]  [address]  [place] [facility] [id number] [phone]     Follow up on 8/29/2022  8:05am.     [facility]  [address], [address]  [facility] [id number] [phone]  Follow up  They will call you to set up an appointment.

## 2022-08-22 NOTE — CARE PLAN
The patient is Stable - Low risk of patient condition declining or worsening    Shift Goals  Clinical Goals: safety, skin integrity  Patient Goals: rest    Progress made toward(s) clinical / shift goals:  Patient is mod I in room. Patient to use call light for any assistance when needed. Skin remains CDI, no wounds noted. Patient is looking forward to dc'ing home tomorrow.       Problem: Mobility  Goal: Patient's capacity to carry out activities will improve  Outcome: Progressing     Problem: Fall Risk - Rehab  Goal: Patient will remain free from falls  Outcome: Progressing     Problem: Skin Integrity  Goal: Patient's skin integrity will be maintained or improve  Outcome: Progressing     Problem: Pain - Standard  Goal: Alleviation of pain or a reduction in pain to the patient’s comfort goal  Outcome: Progressing

## 2022-08-29 ENCOUNTER — OFFICE VISIT (OUTPATIENT)
Dept: MEDICAL GROUP | Facility: LAB | Age: 62
End: 2022-08-29
Payer: COMMERCIAL

## 2022-08-29 VITALS
RESPIRATION RATE: 12 BRPM | TEMPERATURE: 96.9 F | HEIGHT: 60 IN | HEART RATE: 80 BPM | DIASTOLIC BLOOD PRESSURE: 78 MMHG | WEIGHT: 140 LBS | OXYGEN SATURATION: 96 % | BODY MASS INDEX: 27.48 KG/M2 | SYSTOLIC BLOOD PRESSURE: 130 MMHG

## 2022-08-29 DIAGNOSIS — Z78.9 IMPAIRED MOBILITY AND ADLS: ICD-10-CM

## 2022-08-29 DIAGNOSIS — I48.0 PAF (PAROXYSMAL ATRIAL FIBRILLATION) (HCC): ICD-10-CM

## 2022-08-29 DIAGNOSIS — Z74.09 IMPAIRED MOBILITY AND ADLS: ICD-10-CM

## 2022-08-29 DIAGNOSIS — E78.5 DYSLIPIDEMIA: ICD-10-CM

## 2022-08-29 DIAGNOSIS — D68.69 SECONDARY HYPERCOAGULABLE STATE (HCC): ICD-10-CM

## 2022-08-29 DIAGNOSIS — I10 ESSENTIAL HYPERTENSION: ICD-10-CM

## 2022-08-29 DIAGNOSIS — I63.10 CEREBROVASCULAR ACCIDENT (CVA) DUE TO EMBOLISM OF PRECEREBRAL ARTERY (HCC): ICD-10-CM

## 2022-08-29 DIAGNOSIS — Z09 HOSPITAL DISCHARGE FOLLOW-UP: ICD-10-CM

## 2022-08-29 PROBLEM — I63.9 CEREBROVASCULAR ACCIDENT (HCC): Status: ACTIVE | Noted: 2022-08-02

## 2022-08-29 PROCEDURE — 99214 OFFICE O/P EST MOD 30 MIN: CPT | Performed by: NURSE PRACTITIONER

## 2022-08-29 RX ORDER — LISINOPRIL 10 MG/1
10 TABLET ORAL
COMMUNITY
Start: 2022-08-10 | End: 2022-08-29

## 2022-08-29 RX ORDER — METOPROLOL SUCCINATE 50 MG/1
50 TABLET, EXTENDED RELEASE ORAL DAILY
COMMUNITY
Start: 2022-08-11 | End: 2022-09-10

## 2022-08-29 ASSESSMENT — FIBROSIS 4 INDEX: FIB4 SCORE: 0.59

## 2022-08-29 NOTE — PROGRESS NOTES
"  Hospital follow-up      HPI:  Jess is a 62-year-old established female here for hospital follow-up.  Unfortunately she had a stroke on August 2.  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  Then transferred to [facility] on August 10.  MRI at [facility] showed bilateral strokes, largest right frontal, also right parietal lobe in the SOPHIA territory and smaller in left frontal lobe, also SOPHIA territory.  While hospitalized at [facility] she was switched from aspirin to Xarelto, lisinopril was added but discontinued while she was at [facility], per patient.  Fortunately echocardiogram showed an ejection fraction of 65 to 70%.  She needs a referral to est with neurology - she would like to see Dr Araujo.  She is established with cardiology but does not have an appointment on next summer and plans on changing this.    Residual effects of stroke:  lower stamina, mental haze,  left arm weakness, numbness to left side of face near mouth, feels she is talking \"throaty.\"  Can read, write / watch TV.  Overall she feels very fortunate with degree of residual effects from stroke.  She also feels that she is improving daily in terms of left upper extremity strength and capabilities.  Walking 1/2 mile each morning.    Urinating well - no issues.  Stools are harder than what she is use to and she is curious if she needs a stool softener.  On FMLA and considering filing for disability.    Not driving.     She is receiving home physical therapy - currently staying with a friend.  Jess is happy that she can now  well with left hand and has FROM of left arm.     HTN:  closely monitoring BP at home with wide ranges, per patient- 150/80 on her wrist monitor here this morning although medical assistant got 138/78 auscultated.  She is not experiencing " chest pain or heart palpitations.    A.fib:  newly on xarelto, changed from asa.  Compliant with metoprolol 50 mg XL once daily.  She is taking atorvastatin 40 mg at night.  As above, is not experiencing heart racing sensations.  Does not feel dizzy.      Exam:  /78 (BP Location: Right arm, Patient Position: Sitting, BP Cuff Size: Adult)   Pulse 80   Temp 36.1 °C (96.9 °F)   Resp 12   Ht 1.524 m (5')   Wt 63.5 kg (140 lb)   SpO2 96%     Well developed, well nourished female in no apparent distress.  Eyes: Conjunctivae and sclerae are clear and non-icteric. Pupils are equally round and reactive to light, extra-ocular movements intact.   ENT: Nares are patent and without discharge. Oropharynx is clear and without erythema or exudates. Buccal mucosa is moist.  Neck: Supple; there is no adenopathy. No supraclavicular adenopathy is noted.  CV: She is in a regular rhythm today.  Pulm: Clear to auscultation.  Psychiatric: The patient is alert and oriented in all four spheres. Mood is euthymic. Affect is appropriate for the situation.  Skin: No rashes were noted.  Musculoskeletal: Gait is normal. Patient is able to transfer from sitting position to exam table without assistance.  Neurologic: Alert and oriented.  EOMs intact, no tongue deviation, PERRL.  With smiling, she has very slight asymmetry on the left side.   is weaker left compared to right.  Symmetric reflexes. Normal station and gait, normal tandem walk. Coordination normal    A/P:    1. Hospital discharge follow-up        2. Secondary hypercoagulable state (HCC)        3. Dyslipidemia        4. Essential hypertension        5. Impaired mobility and ADLs        6. PAF (paroxysmal atrial fibrillation) (HCC)        7. Cerebrovascular accident (CVA) due to embolism of precerebral artery (HCC)  Referral to Neurology        Reviewed information from [place] on care everywhere along with available information from [facility].  Reviewed all  "medications with her and fortunately Xarelto is affordable for her.  Blood pressure is controlled today in our office, difficult to assess based on home wrist monitor and I encouraged her to pursue a blood pressure monitor to her upper arm.  Fortunately she does have home physical therapy and likely home health nursing coming over and I am sure home health nursing will contact us if her blood pressure is consistently greater than 140/90.  She is not currently taking lisinopril, states that she was not discharged from [facility] rehab on lisinopril, only metoprolol.  She is taking atorvastatin every evening and continues on levothyroxine at 100 mcg.  We discussed the importance of resting and recovering from a stroke.  May certainly continue to do her morning walks as tolerated.  She was referred to neurology and will call cardiology to move her appointment to a sooner date, hopefully within the next few weeks.  I will see her back here in 2 months, at which point we will likely need to recheck thyroid lab work as well.  Fortunately she is staying with a friend and not returning to work at this time.  She is not driving and understands that she should not drive until she is cleared by neurology.  I encouraged her to follow-up also with her eye doctor now that she has had a stroke.  We discussed rehabilitation in depth.  Discussed reading  \"my stroke of insight.\"  Fortunately she is able to read, write and watch TV without any problems.  I have certainly encouraged her to call or email me with any questions prior to our 2-month follow-up.  "

## 2022-09-14 ENCOUNTER — TELEPHONE (OUTPATIENT)
Dept: MEDICAL GROUP | Facility: LAB | Age: 62
End: 2022-09-14
Payer: COMMERCIAL

## 2022-09-14 DIAGNOSIS — I63.10 CEREBROVASCULAR ACCIDENT (CVA) DUE TO EMBOLISM OF PRECEREBRAL ARTERY (HCC): ICD-10-CM

## 2022-09-14 DIAGNOSIS — I63.9 ISCHEMIC STROKE (HCC): ICD-10-CM

## 2022-09-14 DIAGNOSIS — I48.91 ATRIAL FIBRILLATION WITH RVR (HCC): ICD-10-CM

## 2022-09-14 NOTE — TELEPHONE ENCOUNTER
Patient needs a new referral and wants to go to the Female Neurologist that you recommended instead of Dr Guzmán/. She says that she does not have a good feeling about seeing him since he is so old and would like to go with the one you recommended instead. Can you add provider to referral pending

## 2022-09-20 ENCOUNTER — OFFICE VISIT (OUTPATIENT)
Dept: CARDIOLOGY | Facility: MEDICAL CENTER | Age: 62
End: 2022-09-20
Payer: COMMERCIAL

## 2022-09-20 VITALS
SYSTOLIC BLOOD PRESSURE: 116 MMHG | RESPIRATION RATE: 16 BRPM | WEIGHT: 138 LBS | HEART RATE: 80 BPM | BODY MASS INDEX: 27.09 KG/M2 | DIASTOLIC BLOOD PRESSURE: 60 MMHG | OXYGEN SATURATION: 97 % | HEIGHT: 60 IN

## 2022-09-20 DIAGNOSIS — I63.10 CEREBROVASCULAR ACCIDENT (CVA) DUE TO EMBOLISM OF PRECEREBRAL ARTERY (HCC): ICD-10-CM

## 2022-09-20 DIAGNOSIS — I10 ESSENTIAL HYPERTENSION: ICD-10-CM

## 2022-09-20 DIAGNOSIS — Z79.01 CHRONIC ANTICOAGULATION: ICD-10-CM

## 2022-09-20 DIAGNOSIS — E78.5 DYSLIPIDEMIA: ICD-10-CM

## 2022-09-20 DIAGNOSIS — I48.0 PAF (PAROXYSMAL ATRIAL FIBRILLATION) (HCC): ICD-10-CM

## 2022-09-20 PROBLEM — I48.91 ATRIAL FIBRILLATION WITH RVR (HCC): Status: RESOLVED | Noted: 2022-02-15 | Resolved: 2022-09-20

## 2022-09-20 PROCEDURE — 99214 OFFICE O/P EST MOD 30 MIN: CPT | Performed by: NURSE PRACTITIONER

## 2022-09-20 ASSESSMENT — ENCOUNTER SYMPTOMS
LOSS OF CONSCIOUSNESS: 0
BRUISES/BLEEDS EASILY: 0
NAUSEA: 0
MYALGIAS: 0
PALPITATIONS: 0
CHILLS: 0
COUGH: 0
HEADACHES: 0
DIZZINESS: 0
SHORTNESS OF BREATH: 0
FEVER: 0
NERVOUS/ANXIOUS: 1
ORTHOPNEA: 0
PND: 0
ABDOMINAL PAIN: 0
INSOMNIA: 0

## 2022-09-20 ASSESSMENT — FIBROSIS 4 INDEX: FIB4 SCORE: 0.59

## 2022-09-20 NOTE — PROGRESS NOTES
Chief Complaint   Patient presents with    Hospital Follow-up     CVA st [facility] on 8/2/2022    Atrial Fibrillation    Anticoagulation    HTN (Controlled)    Hyperlipidemia       Subjective     Jess Root is a 62 y.o. female who presents today for hospital follow-up of CVA.    Jess is a 62 year old female with history of hypothyroidism and hyperlipidemia, and new onset AFib with RVR in March 2022. She was treated with Metoprolol and ASA only ([id number] score 1 = female).    In August 2022, she presented to [facility] with right and left arm weakness/numbness, and MRI confirmed left front infarct. She was treated with tPA, and started on Xarelto for anticoagulation. Lisinopril was initially added for elevated BP, but she has not been taking this. BP has been stable on Metoprolol only.     She is here today for hospital follow-up. Generally, she is doing well and is making very good progress; she is seeing PT/OT and Speech Therapy. She denies any chest pain, pressure, tightness or discomfort; no shortness of breath, orthopnea or PND; no dizziness or syncope; no LE edema. No episodes of palpitations. She does admit to some anxiety. No bleeding problems on Xarelto.     Past Medical History:   Diagnosis Date    Atrial fibrillation (HCC) 02/2022    Echocardiogram with normal LV size, LVEF 60%. Moderately dilated LA. Trace MR, mild AI.    Chronic anticoagulation     History of stroke 08/2022    MRI with left frontal infarct.    HSV-2 infection     Hyperlipidemia     Osteopenia     Pseudocholinesterase deficiency     Thyroid disease nodules - thyroidectomy    Varicose veins of both lower extremities      Past Surgical History:   Procedure Laterality Date    VEIN STRIPPING  2005    ABDOMINAL HYSTERECTOMY TOTAL  2005    MENISCUS REPAIR      right knee 9/2017 - Dr. Johnson    THYROIDECTOMY       Family History   Problem Relation Age of Onset    Diabetes Mother     Heart Disease Mother     Stroke Mother     Heart Disease  Father      Social History     Socioeconomic History    Marital status:      Spouse name: Not on file    Number of children: Not on file    Years of education: Not on file    Highest education level: Not on file   Occupational History    Not on file   Tobacco Use    Smoking status: Never    Smokeless tobacco: Never   Vaping Use    Vaping Use: Never used   Substance and Sexual Activity    Alcohol use: No    Drug use: No    Sexual activity: Not on file     Comment: , two kids   Other Topics Concern    Not on file   Social History Narrative    Not on file     Social Determinants of Health     Financial Resource Strain: Not on file   Food Insecurity: Not on file   Transportation Needs: Not on file   Physical Activity: Not on file   Stress: Not on file   Social Connections: Not on file   Intimate Partner Violence: Not on file   Housing Stability: Not on file     Allergies   Allergen Reactions    Other Misc      Anesthesia-- pseudocholinesterace    Succinylcholine      PSEUDOCHOLINE DEFICIENCY     Outpatient Encounter Medications as of 9/20/2022   Medication Sig Dispense Refill    rivaroxaban (XARELTO) 20 MG Tab tablet Take 1 Tablet by mouth with dinner. 90 Tablet 2    atorvastatin (LIPITOR) 40 MG Tab Take 1 Tablet by mouth every evening. 30 Tablet 2    levothyroxine (SYNTHROID) 100 MCG Tab TAKE ONE TABLET BY MOUTH EVERY MORNING ON AN EMPTY STOMACH 30 Tablet 2    metoprolol SR (TOPROL XL) 25 MG TABLET SR 24 HR Take 1 Tablet by mouth every day. 100 Tablet 3     No facility-administered encounter medications on file as of 9/20/2022.     Review of Systems   Constitutional:  Positive for malaise/fatigue. Negative for chills and fever.   HENT:  Negative for congestion.    Respiratory:  Negative for cough and shortness of breath.    Cardiovascular:  Negative for chest pain, palpitations, orthopnea, leg swelling and PND.   Gastrointestinal:  Negative for abdominal pain and nausea.   Musculoskeletal:  Negative for  myalgias.   Skin:  Negative for rash.   Neurological:  Negative for dizziness, loss of consciousness and headaches.   Endo/Heme/Allergies:  Does not bruise/bleed easily.   Psychiatric/Behavioral:  The patient is nervous/anxious. The patient does not have insomnia.             Objective     /60 (BP Location: Left arm, Patient Position: Sitting, BP Cuff Size: Adult)   Pulse 80   Resp 16   Ht 1.524 m (5')   Wt 62.6 kg (138 lb)   SpO2 97%   BMI 26.95 kg/m²     Physical Exam  Constitutional:       Appearance: She is well-developed.   HENT:      Head: Normocephalic.   Neck:      Vascular: No JVD.      Comments: Scar of previous thyroidectomy.  Cardiovascular:      Rate and Rhythm: Normal rate and regular rhythm.      Heart sounds: Normal heart sounds.   Pulmonary:      Effort: Pulmonary effort is normal. No respiratory distress.      Breath sounds: Normal breath sounds. No wheezing or rales.   Abdominal:      General: Bowel sounds are normal. There is no distension.      Palpations: Abdomen is soft.      Tenderness: There is no abdominal tenderness.   Musculoskeletal:         General: Normal range of motion.      Cervical back: Normal range of motion and neck supple.   Skin:     General: Skin is warm and dry.      Findings: No rash.   Neurological:      Mental Status: She is alert and oriented to person, place, and time.      Comments: No obvious neurological deficits. Strength and mobility are intact. No unilateral weakness noted.     FINDINGS OF MRI OF 8/9/2022:  There is focal area of restricted diffusion in the left frontal cortex adjacent to the anterior falx in territory of left anterior cerebral artery distribution.  There is also focal area of restricted diffusion in the right parietal vertex in the cortex and in the right centrum semiovale deep white matter and more inferiorly in the right frontal parietal cortex.  This area is in the distribution of right anterior cerebral artery branches.  There are  no extra-axial fluid collections.  Vascular flow void of vessels at the base of the brain is normal with dominant left vertebral artery noted.  Ventricular system is normal in size and configuration.  There are mild foci of increased T2 signal in the periventricular white matter consistent with minimal small vessel ischemic changes and gliosis.  Following infusion of gadolinium contrast there is no evidence of abnormal enhancement.  No evidence of acute hemorrhage is seen.    Echocardiogram of 8/3/2022:  1. The left ventricle is normal in size, thickness, and systolic function. Ejection fraction of 65-70% by Yuen's Biplane. There is grade 1 (impaired relaxation) diastolic function with normal LV filling pressures.   2. No significant valvular abnormalities.   3. Normal right ventricular size, wall thickness and function.   4. Saline bubble study performed with no evidence of interatrial shunt.        Lab Results   Component Value Date/Time    WBC 7.4 08/14/2022 05:20 AM    RBC 4.78 08/14/2022 05:20 AM    HEMOGLOBIN 15.3 08/14/2022 05:20 AM    HEMATOCRIT 44.1 08/14/2022 05:20 AM    MCV 92.3 08/14/2022 05:20 AM    MCH 32.0 08/14/2022 05:20 AM    MCHC 34.7 08/14/2022 05:20 AM    MPV 10.0 08/14/2022 05:20 AM       Lab Results   Component Value Date/Time    SODIUM 138 08/14/2022 05:20 AM    POTASSIUM 3.8 08/14/2022 05:20 AM    CHLORIDE 103 08/14/2022 05:20 AM    CO2 23 08/14/2022 05:20 AM    GLUCOSE 90 08/14/2022 05:20 AM    BUN 15 08/14/2022 05:20 AM    CREATININE 0.69 08/14/2022 05:20 AM      Lab Results   Component Value Date/Time    CHOLSTRLTOT 236 (H) 01/31/2022 09:15 AM     (H) 01/31/2022 09:15 AM    HDL 57 01/31/2022 09:15 AM    TRIGLYCERIDE 60 01/31/2022 09:15 AM            Assessment & Plan     1. Cerebrovascular accident (CVA) due to embolism of precerebral artery (HCC)  Referral to Physical Medicine Rehab      2. PAF (paroxysmal atrial fibrillation) ([facility])  Referral to Physical Medicine Rehab      3.  Chronic anticoagulation        4. Essential hypertension        5. Dyslipidemia            Medical Decision Making: Today's Assessment/Status/Plan:      1. Recent hospitalization for CVA at [facility], with minimal neurological residual deficits. She is working with PT/OT and Speech therapy and making good progress. She would like a referral to the [facility]. Continue Xarelto, Metoprolol and statin.    2. PAFib, in sinus rhythm today. Discussed getting an Apple Watch of [employer] to monitor her rhythm at home. She is given written information.    3. Chronic anticoagulation with Xarelto. No bleeding issues.    4. Hypertension, currently stable on Metoprolol. To continue to monitor this at home.    5. Hyperlipidemia, on a statin, to continue.    Same medications for now. Continue with outpatient therapy. She is givan referral to Stroke Clinic at [facility]. Follow-up with me in 3 months, sooner if clinical condition changes.

## 2022-09-21 ENCOUNTER — TELEPHONE (OUTPATIENT)
Dept: MEDICAL GROUP | Facility: LAB | Age: 62
End: 2022-09-21
Payer: COMMERCIAL

## 2022-09-21 NOTE — TELEPHONE ENCOUNTER
"rehab  paperwork received from [facility] requiring provider signature.     All appropriate fields completed by Medical Assistant: N/A CMA printed and distributed to MA    Paperwork placed in \"MA to Provider\" folder/basket. Awaiting provider completion/signature.    "

## 2022-10-21 ENCOUNTER — TELEPHONE (OUTPATIENT)
Dept: MEDICAL GROUP | Facility: LAB | Age: 62
End: 2022-10-21
Payer: COMMERCIAL

## 2022-10-21 NOTE — TELEPHONE ENCOUNTER
1. Caller Name: Jess Root                        Call Back Number: [phone] (home)         How would the patient prefer to be contacted with a response: Phone call OK to leave a detailed message    Anticipated date to return back to work NOV 2. Pt feels she can't go back yet. She will need a note to extend her leave from her stroke. Pt still in therapy, still can't use her arm.   Feel free to call pt if you need anymore information.   HR:   [email or web address]

## 2022-10-21 NOTE — TELEPHONE ENCOUNTER
Legally - best bet would be to have her come in so that we can document all of this and write a letter during visit. thanks

## 2022-10-25 ENCOUNTER — HOSPITAL ENCOUNTER (OUTPATIENT)
Dept: LAB | Facility: MEDICAL CENTER | Age: 62
End: 2022-10-25
Attending: NURSE PRACTITIONER
Payer: COMMERCIAL

## 2022-10-25 ENCOUNTER — OFFICE VISIT (OUTPATIENT)
Dept: MEDICAL GROUP | Facility: LAB | Age: 62
End: 2022-10-25
Payer: COMMERCIAL

## 2022-10-25 ENCOUNTER — APPOINTMENT (OUTPATIENT)
Dept: MEDICAL GROUP | Facility: LAB | Age: 62
End: 2022-10-25
Payer: COMMERCIAL

## 2022-10-25 VITALS
BODY MASS INDEX: 28.47 KG/M2 | SYSTOLIC BLOOD PRESSURE: 140 MMHG | OXYGEN SATURATION: 95 % | TEMPERATURE: 97.7 F | RESPIRATION RATE: 16 BRPM | DIASTOLIC BLOOD PRESSURE: 76 MMHG | HEART RATE: 80 BPM | HEIGHT: 60 IN | WEIGHT: 145 LBS

## 2022-10-25 DIAGNOSIS — I63.10 CEREBROVASCULAR ACCIDENT (CVA) DUE TO EMBOLISM OF PRECEREBRAL ARTERY (HCC): ICD-10-CM

## 2022-10-25 DIAGNOSIS — E89.0 POSTOPERATIVE HYPOTHYROIDISM: ICD-10-CM

## 2022-10-25 LAB
T4 FREE SERPL-MCNC: 1.98 NG/DL (ref 0.93–1.7)
TSH SERPL DL<=0.005 MIU/L-ACNC: 0.89 UIU/ML (ref 0.38–5.33)

## 2022-10-25 PROCEDURE — 84443 ASSAY THYROID STIM HORMONE: CPT

## 2022-10-25 PROCEDURE — 84439 ASSAY OF FREE THYROXINE: CPT

## 2022-10-25 PROCEDURE — 99214 OFFICE O/P EST MOD 30 MIN: CPT | Performed by: NURSE PRACTITIONER

## 2022-10-25 PROCEDURE — 36415 COLL VENOUS BLD VENIPUNCTURE: CPT

## 2022-10-25 ASSESSMENT — FIBROSIS 4 INDEX: FIB4 SCORE: 0.59

## 2022-10-25 NOTE — PROGRESS NOTES
CC  F/u on CVA        HPI:  Jess is a 62-year-old established female, last seen on August 29 after being discharged from the hospital following a stroke that occurred on August 2.  She is now established with cardiology and waiting on her first appointment with neurology.  FLMA ends 11/2/2022 but she feels that she can not return to work -her occupational therapist is here today with her and agrees.  Jess is  Persistently suffering from decreased sensation in left hand as well as gripping of left first and 4th finger.  Ability to write / word recall / spelling is difficult for her and takes her a much longer time than prior to having a stroke.    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  Uses auto pay to pay cell phone / electricity bill.  Grocery shopping with OT and her OT tells me that her ability to do this has improved but needs to shop at small / quiet places like [employer].    Not back to driving yet but her OT plans on helping her back into this at their next session.    She has decreased  strength in left hand.  Visual perceptive difficulties in left eye per her OT who is here with her today.    Increased concentration demand with talking when fatigued.    + decreased strength in entire left arm with lifting / pulling / pushing.   +decreased strength in left leg - working with PT regarding speed / thinking with walking - slow with thinking / performing tasks.   Cognitive deficits:  struggling to recall information, short term memory, attention / concentration.    Working with OT / PT / speech twice weekly and this is their last week but will leave her with home program to do on her own.    Lives on her own but has a good friend support group.     Hypothyroidism:  chronic issue.  Taking 100 mg levothyroxine.  Due for labs.      Exam:  BP (!) 140/76 (BP Location: Right arm, Patient Position: Sitting, BP Cuff Size:  "Adult)   Pulse 80   Temp 36.5 °C (97.7 °F)   Resp 16   Ht 1.524 m (5')   Wt 65.8 kg (145 lb)   SpO2 95%   Gen: NAD  Resp: nonlabored.  Able to speak in full sentences  Psy: pleasant / cooperative.   Neurologic: Alert and oriented. EOMs intact, no tongue deviation, PERRL.  Very slight droop left corner of mouth with smiling and talking.  Struggles with left side on finger-to-nose test.  No pronator drift.  Weak left hand , particularly flexing left first and fourth fingers.  Symmetric reflexes. Normal station and gait, normal tandem walk. Coordination normal.  Word recall has difficult for her in the room and she relies on her physical therapist to answer a few questions.      A/P:  \"  1. Cerebrovascular accident (CVA) due to embolism of precerebral artery (HCC)        2. Postoperative hypothyroidism  TSH    FREE THYROXINE      \"  Patient is certainly not ready to return to work.  Continues with OT, PT and speech therapy.  Still awaiting neurology consult.  Up-to-date with cardiology.  Considering seeing neuro physiatry.  I will see her back here for reevaluation in 3 months.      "

## 2022-10-25 NOTE — LETTER
October 25, 2022        Jess Clayton Dk  [address] [place] [address]        Judith Maxwell:     Jess has significant residual neurological deficits related to CVA 8/2/2022 and is unable to return for work for another 3 months.  She is currently scheduled to return to work 11/2/2022 but we need to extend this through 1/25/2023 at which point she will be re-evaluated in our office.      If you have any questions or concerns, please don't hesitate to call.        Sincerely,        BLADE Rodas.    Electronically Signed

## 2022-10-26 ENCOUNTER — TELEPHONE (OUTPATIENT)
Dept: MEDICAL GROUP | Facility: LAB | Age: 62
End: 2022-10-26
Payer: COMMERCIAL

## 2022-10-26 DIAGNOSIS — E03.8 OTHER SPECIFIED HYPOTHYROIDISM: ICD-10-CM

## 2022-10-26 RX ORDER — LEVOTHYROXINE SODIUM 0.1 MG/1
TABLET ORAL
Qty: 30 TABLET | Refills: 2
Start: 2022-10-26 | End: 2022-12-01 | Stop reason: SDUPTHER

## 2022-10-26 NOTE — TELEPHONE ENCOUNTER
Patient just picked up the 100mg just recently. She would be ok with you ordering the 88mg also wants to know when you would want her to recheck this. If not for 3 months, then she would prefer a 90 day supply

## 2022-10-26 NOTE — TELEPHONE ENCOUNTER
----- Message from BESSY Rodas sent at 10/26/2022  4:11 PM PDT -----  Please call Jess:  Thyroid is a little bit overmedicated still.  Will you ask if she is running low on her levothyroxine as I would like to decrease it to 88 mcg daily.  Thanks

## 2022-10-27 NOTE — TELEPHONE ENCOUNTER
No need to  another prescription.  Can just decrease her 100 mg to 1/2 pill once per week, continuing the whole pill 6 days/week and rechecking in 3 months.

## 2022-11-01 ENCOUNTER — TELEPHONE (OUTPATIENT)
Dept: MEDICAL GROUP | Facility: LAB | Age: 62
End: 2022-11-01
Payer: COMMERCIAL

## 2022-11-01 NOTE — TELEPHONE ENCOUNTER
Patient called and LVM wanting to know if she should be on an antibiotic for her upcoming teeth cleaning since she is on blood thinners.  Please advise

## 2022-11-28 ENCOUNTER — TELEPHONE (OUTPATIENT)
Dept: NEUROLOGY | Facility: MEDICAL CENTER | Age: 62
End: 2022-11-28
Payer: COMMERCIAL

## 2022-11-28 NOTE — TELEPHONE ENCOUNTER
New Patient     [facility] Patient is checked in Patient Demographics? Yes    Is visit type and length correct?  Yes    Is referral attached to visit? Yes    Were records received from referring provider? Yes, referring provider [facility] so [facility] in [facility].     Patient was not contacted to have someone accompany them to visit?    Is this appointment scheduled as a Hospital Follow-Up?  Yes    Does the patient require any pre procedure or post procedure follow up? No    If any orders were placed at last visit or intended to be done for this visit do we have Results/Consult Notes? Yes  Labs -  There are recent labs in [facility].   Imaging - There are recent in images in Epic.   Referrals - No referrals were ordered at last office visit.        10.  If patient appointment is for Botox - is order pended for provider? No

## 2022-12-01 DIAGNOSIS — I48.91 ATRIAL FIBRILLATION WITH RVR (HCC): ICD-10-CM

## 2022-12-01 DIAGNOSIS — E03.8 OTHER SPECIFIED HYPOTHYROIDISM: ICD-10-CM

## 2022-12-01 DIAGNOSIS — I63.9 ISCHEMIC STROKE (HCC): ICD-10-CM

## 2022-12-01 DIAGNOSIS — I48.0 PAROXYSMAL ATRIAL FIBRILLATION (HCC): ICD-10-CM

## 2022-12-01 RX ORDER — ATORVASTATIN CALCIUM 40 MG/1
40 TABLET, FILM COATED ORAL EVERY EVENING
Qty: 30 TABLET | Refills: 2 | Status: SHIPPED | OUTPATIENT
Start: 2022-12-01 | End: 2022-12-20 | Stop reason: SDUPTHER

## 2022-12-01 RX ORDER — LEVOTHYROXINE SODIUM 88 UG/1
TABLET ORAL
Qty: 90 TABLET | Refills: 1 | Status: SHIPPED
Start: 2022-12-01 | End: 2022-12-26

## 2022-12-01 RX ORDER — METOPROLOL SUCCINATE 25 MG/1
25 TABLET, EXTENDED RELEASE ORAL DAILY
Qty: 100 TABLET | Refills: 3 | Status: SHIPPED | OUTPATIENT
Start: 2022-12-01 | End: 2023-07-24 | Stop reason: SDUPTHER

## 2022-12-01 RX ORDER — FUROSEMIDE 20 MG/1
20 TABLET ORAL
Qty: 30 TABLET | Refills: 3 | Status: SHIPPED | OUTPATIENT
Start: 2022-12-01 | End: 2023-06-29 | Stop reason: SDUPTHER

## 2022-12-01 NOTE — TELEPHONE ENCOUNTER
PT called and  would like  88 mcg daily, 90 day supply sent to [facility]. Also asking if she can continue with Lasix? Sh did eat badly on Thanksgiving.

## 2022-12-05 ENCOUNTER — OFFICE VISIT (OUTPATIENT)
Dept: NEUROLOGY | Facility: MEDICAL CENTER | Age: 62
End: 2022-12-05
Attending: NURSE PRACTITIONER
Payer: COMMERCIAL

## 2022-12-05 VITALS
BODY MASS INDEX: 29.82 KG/M2 | WEIGHT: 151.9 LBS | TEMPERATURE: 98.3 F | DIASTOLIC BLOOD PRESSURE: 86 MMHG | OXYGEN SATURATION: 96 % | SYSTOLIC BLOOD PRESSURE: 142 MMHG | HEART RATE: 67 BPM | HEIGHT: 60 IN

## 2022-12-05 DIAGNOSIS — I69.30 LATE EFFECT OF STROKE: ICD-10-CM

## 2022-12-05 DIAGNOSIS — E78.2 MIXED HYPERLIPIDEMIA: ICD-10-CM

## 2022-12-05 DIAGNOSIS — I48.0 PAF (PAROXYSMAL ATRIAL FIBRILLATION) (HCC): ICD-10-CM

## 2022-12-05 DIAGNOSIS — I10 ESSENTIAL HYPERTENSION: ICD-10-CM

## 2022-12-05 PROCEDURE — 99215 OFFICE O/P EST HI 40 MIN: CPT | Performed by: NURSE PRACTITIONER

## 2022-12-05 PROCEDURE — 99211 OFF/OP EST MAY X REQ PHY/QHP: CPT | Performed by: NURSE PRACTITIONER

## 2022-12-05 PROCEDURE — 99417 PROLNG OP E/M EACH 15 MIN: CPT | Performed by: NURSE PRACTITIONER

## 2022-12-05 RX ORDER — LEVOTHYROXINE SODIUM 112 UG/1
TABLET ORAL
COMMUNITY
Start: 2022-11-14 | End: 2022-12-20

## 2022-12-05 ASSESSMENT — ENCOUNTER SYMPTOMS
BLOOD IN STOOL: 0
SHORTNESS OF BREATH: 1
FEVER: 0
HEADACHES: 0
BLURRED VISION: 0
SPEECH CHANGE: 1
PALPITATIONS: 1
FALLS: 1
DEPRESSION: 1
COUGH: 1
NERVOUS/ANXIOUS: 0
MEMORY LOSS: 1
BRUISES/BLEEDS EASILY: 1
DOUBLE VISION: 0

## 2022-12-05 ASSESSMENT — FIBROSIS 4 INDEX: FIB4 SCORE: 0.59

## 2022-12-05 NOTE — PROGRESS NOTES
Subjective     HPI    Jess Root is a 62 y.o. right handed female who presents to [facility] for evaluation of infarcts of left frontal cortex, right parietal lobe and right centrum semiovale.  She presented to [facility] on 8/2/2022 with complaints of right LE weakness and LUE weakness, she received tPA in the emergency room.  NIHSS on admission unknown. She was found to have bilateral cortical infarcts, she had a history of atrial fibrillation, she had not been on anticoagulation. (Her CHADSVAS Score was 1 in June 2022).    Discharged on Atorvastatin 40mg and Xarelto 20mg.       She is here today with her friend.  She has been checking blood pressure at home, it runs 110s-120s/60s-70s. She still has difficulty with her memory, she feels like she really has to work to pronounce words, she still has some numbness of the left side of her mouth. She still has some decreased sensation in left hand, she has difficulty with fine motor skills of the left hand.  She has completed PT/OT/ST.      Review of Systems   Constitutional:  Negative for fever.   HENT:  Negative for nosebleeds.    Eyes:  Negative for blurred vision and double vision.   Respiratory:  Positive for cough and shortness of breath.    Cardiovascular:  Positive for chest pain and palpitations.   Gastrointestinal:  Negative for blood in stool.   Genitourinary:  Negative for hematuria.   Musculoskeletal:  Positive for falls.        She fell out of the truck, she was not getting out the correct way.   Neurological:  Positive for speech change. Negative for headaches.   Endo/Heme/Allergies:  Bruises/bleeds easily.   Psychiatric/Behavioral:  Positive for depression and memory loss. The patient is not nervous/anxious.        Current Outpatient Medications on File Prior to Visit   Medication Sig Dispense Refill    rivaroxaban (XARELTO) 20 MG Tab tablet Take 1 Tablet by mouth with dinner. 90 Tablet 2    atorvastatin (LIPITOR) 40  MG Tab Take 1 Tablet by mouth every evening. 30 Tablet 2    metoprolol SR (TOPROL XL) 25 MG TABLET SR 24 HR Take 1 Tablet by mouth every day. 100 Tablet 3    furosemide (LASIX) 20 MG Tab Take 1 Tablet by mouth 1 time a day as needed (swelling). 30 Tablet 3    levothyroxine (SYNTHROID) 88 MCG Tab TAKE ONE TABLET BY MOUTH EVERY MORNING ON AN EMPTY STOMACH 6 d per week, 1/2 one d per week 90 Tablet 1    levothyroxine (SYNTHROID) 112 MCG Tab        No current facility-administered medications on file prior to visit.     Past Medical History:   Diagnosis Date    Atrial fibrillation (HCC) 02/2022    Echocardiogram with normal LV size, LVEF 60%. Moderately dilated LA. Trace MR, mild AI.    Chronic anticoagulation     History of stroke 08/2022    MRI with left frontal infarct.    HSV-2 infection     Hyperlipidemia     Osteopenia     Pseudocholinesterase deficiency     Thyroid disease nodules - thyroidectomy    Varicose veins of both lower extremities         Social History     Tobacco Use    Smoking status: Never    Smokeless tobacco: Never   Vaping Use    Vaping Use: Never used   Substance Use Topics    Alcohol use: No    Drug use: No        Objective     I personally reviewed imaging below and agree with the findings    MRI brain ([place], report only, imaging unavailable, 8/3/2022).  There is focal area of restricted diffusion in the left frontal cortex adjacent to the anterior falx in territory of left anterior cerebral artery distribution.  There is also focal area of restricted diffusion in the right parietal vertex in the cortex and in the right centrum semiovale deep white matter and more inferiorly in the right frontal parietal cortex.  This area is in the distribution of right anterior cerebral artery branches.  There are no extra-axial fluid collections.  Vascular flow void of vessels at the base of the brain is normal with dominant left vertebral artery noted.  Ventricular system is normal in size and  configuration.  There are mild foci of increased T2 signal in the periventricular white matter consistent with minimal small vessel ischemic changes and gliosis.  Following infusion of gadolinium contrast there is no evidence of abnormal enhancement.  No evidence of acute hemorrhage is seen.    CTA head 8/2/2022    No proximal large vessel occlusion    CTA neck 8/2/2022  Widely patent carotid and vertebral arteries in the neck.  Minimal plaque in both proximal ICAs.    CT  head 8/2/2022  BRAIN: No acute intracranial hemorrhage, mass effect, or extra-axial fluid collection identified.  There is thin periventricular white matter hypoattenuation consistent with mild chronic small vessel ischemic changes.   Gray-white differentiation is preserved throughout.     Normal size of ventricles.     ORBITS: Unremarkable     SINUSES: Small mucosal thickening in the right sphenoid sinus.  Otherwise clear.  No mastoid effusion      CT head 8/5/2022  NO INTRACRANIAL HEMORRHAGE, MASS EFFECT OR NEW LESIONS.   STABLE INFARCTS IN THE LEFT FRONTAL LOBE AND RIGHT FRONTAL PARIETAL LOBE JUNCTION SINCE THE MRI SCAN OF AUGUST 3, 2022.    TTE 8/3/2022:   LVEF 65-70%, grade 1 diastolic dysfunction, bubble negative, LA size WNL, CEDRICK 25.    Stroke Labs:  , Creat. 0.7, A1C      Encounter Vitals  Standard Vitals  Vitals  Blood Pressure: (!) 152/76  Temperature: 36.8 °C (98.3 °F)  Temp src: Temporal  Pulse: 67  Pulse Oximetry: 96 %  Height: 152.4 cm (5')  Weight: 68.9 kg (151 lb 14.4 oz)  Encounter Vitals  Temperature: 36.8 °C (98.3 °F)  Temp src: Temporal  Blood Pressure: (!) 152/76  Pulse: 67  Pulse Oximetry: 96 %  Weight: 68.9 kg (151 lb 14.4 oz)  Height: 152.4 cm (5')  BMI (Calculated): 29.67    Encounter Vitals  Standard Vitals  Vitals  Blood Pressure: (!) 142/86  Temperature: 36.8 °C (98.3 °F)  Temp src: Temporal  Pulse: 67  Pulse Oximetry: 96 %  Height: 152.4 cm (5')  Weight: 68.9 kg (151 lb 14.4 oz)  Encounter Vitals  Temperature:  36.8 °C (98.3 °F)  Temp src: Temporal  Blood Pressure: (!) 142/86  Pulse: 67  Pulse Oximetry: 96 %  Weight: 68.9 kg (151 lb 14.4 oz)  Height: 152.4 cm (5')  BMI (Calculated): 29.67      PHYSICAL ASSESSMENT  Constitutional:  Alert, no apparent distress,  Psych:   mood and affect WNL  Muskuloskeletal:  Moves all extremities equally, strength 5/5  BUE/BLE flexors/extensors, no drift  NEUROLOGICAL ASSESSMENT  Oriented X 4, speech fluent, naming and memory intact  CN II: Visual fields are full to confrontation. Fundoscopic exam is normal with sharp discs and no vascular changes. Pupils are 3 mm and briskly reactive to light.   CN III: IV, VI  EOMs intact, no ptosis  CN V: Facial sensation is intact to pinprick in all 3 divisions bilaterally. Corneal responses are intact.  CN VII: Face is symmetric with normal eye closure and smile.  CN VIII Hearing is normal to rubbing fingers  CN IX, X: Palate elevates symmetrically. Phonation is normal.  CN XI: Head turning and shoulder shrug are intact  CN XII: Tongue is midline with normal movements and no atrophy.                           Sensation to PP equal bilaterally                 No limb ataxia with finger to nose and heel to shin                 Ambulates with steady gait.                 Rhomberg negative                Biceps,brachioradialis, tricep, and patellar reflexes all 2+     Cardiovascular:    S1S2, no abnormal rhythm auscultated, no peripheral edema  Neck:                     No carotid bruits noted   Pulmonary:            Respirations easy, lungs clear to auscultation all fields.     Skin:                     No obvious rashes.      Iniital NIHSS unknown      Current NIHSS    1a. LOC: 0  1b. LOC Questions: 0  1c. LOC Commands: 0  2. Best Gaze:0  3. Visual Fields: 0  4. Facial Paresis: 0  5a. Motor arm left: 0  5b. Motor arm right: 0  6a. Motor leg left: 0  6b. Motor leg right: 0  7. Sensory: 1  8. Best Language: 0  9. Limb Ataxia: 0  10. Dysarthria: 0  11.  Extinction/Inattention: 0    Total Score Current 0          Current mRS 2        Assessment & Plan       1. Late effect of stroke:  Bilateral cortical infarcts likely due to atrial fibrillation. She had previously been diagnosed with Afib; however, CHADSVAS was only 1; therefore, she had appropriately not been on AC.       Presumed Mechanism by TOAST  __  Large Artery Atherosclerosis  __  Small Vessel (lacunar)  __XX   Cardioembolic  __   Other (Sickle cell, vasculitis, hypercoagulable)  __   Unknown      Stroke risk factors:  HTN, HLD, PAF    2. Essential HTN      Blood pressure goal less than 130/80, current 152/76, (142/86 on repeat) monitor blood pressure at home, follow up with PCP with blood pressure log.     3. PAF (paroxysmal atrial fibrillation) (HCC)      She had a very low CHADVAS score in 6/2022, it was only 1; therefore, she was not anticoagulated.  Current CHADSVAS = 4             Continue Xarelto 20mg every evening WITH FOOD, discussed the importance of taking with food as this medication is not absorbed without food which can lead to anti-coagulation failure and subsequent stroke.    4. Mixed hyperlipidemia      LDL goal < 70, current 161, continue Atorvastatin 40mg,, discussed medication side effects, will need follow up with primary care evaluate liver function at intervals and refill  Exercise at least 30 minutes daily, avoid/minimize red meat, fried foods, butter, cheese.   Eat 5-6 servings of vegetables and fruits daily, choose lean white meat without skin (chicken, turkey, white fish)--baked, broiled or grilled.            If any new signs of stroke:  sudden weakness, numbness, speech difficulty (slurring or difficulty finding words), imbalance, incoordination, worse headache of life or vision loss occur, call 911.      Take all medications as prescribed unless instructed by your provider.      I spent a total of 71 minutes caring for patient,  my time includes counseling, review of systems,  HPI and assessment, review of images, labs and testing as above.  I reviewed the hospital records, PMH, social and family history.   I have counseled patient on stroke prevention strategies, stroke symptoms and mimics.  Diet and exercise modifications.  We discussed medication side effects and instructions.       I have provided patient a written personalized stroke prevention plan,  it is filed under the media tab under '[facility]”.      Referral back to [facility] PCP

## 2022-12-05 NOTE — PATIENT INSTRUCTIONS
Xarelto MUST be taken with food in order to be effective, it is not absorbed if taken on an empty stomach.    If any new signs of stroke:  sudden weakness, numbness, speech difficulty (slurring or difficulty finding words), imbalance, incoordination, worse headache of life or vision loss occur, call 911.      Take all medications as prescribed unless instructed by your provider.

## 2022-12-20 ENCOUNTER — OFFICE VISIT (OUTPATIENT)
Dept: CARDIOLOGY | Facility: MEDICAL CENTER | Age: 62
End: 2022-12-20
Payer: COMMERCIAL

## 2022-12-20 VITALS
DIASTOLIC BLOOD PRESSURE: 80 MMHG | HEART RATE: 73 BPM | WEIGHT: 153 LBS | OXYGEN SATURATION: 99 % | RESPIRATION RATE: 16 BRPM | BODY MASS INDEX: 30.04 KG/M2 | SYSTOLIC BLOOD PRESSURE: 118 MMHG | HEIGHT: 60 IN

## 2022-12-20 DIAGNOSIS — E78.5 DYSLIPIDEMIA: ICD-10-CM

## 2022-12-20 DIAGNOSIS — I63.9 ISCHEMIC STROKE (HCC): ICD-10-CM

## 2022-12-20 DIAGNOSIS — I63.10 CEREBROVASCULAR ACCIDENT (CVA) DUE TO EMBOLISM OF PRECEREBRAL ARTERY (HCC): ICD-10-CM

## 2022-12-20 DIAGNOSIS — Z79.01 CHRONIC ANTICOAGULATION: ICD-10-CM

## 2022-12-20 DIAGNOSIS — I48.0 PAF (PAROXYSMAL ATRIAL FIBRILLATION) (HCC): ICD-10-CM

## 2022-12-20 DIAGNOSIS — I48.91 ATRIAL FIBRILLATION WITH RVR (HCC): ICD-10-CM

## 2022-12-20 DIAGNOSIS — I10 ESSENTIAL HYPERTENSION: ICD-10-CM

## 2022-12-20 DIAGNOSIS — E89.0 POSTOPERATIVE HYPOTHYROIDISM: ICD-10-CM

## 2022-12-20 DIAGNOSIS — I48.0 PAROXYSMAL ATRIAL FIBRILLATION (HCC): ICD-10-CM

## 2022-12-20 PROCEDURE — 99214 OFFICE O/P EST MOD 30 MIN: CPT | Performed by: NURSE PRACTITIONER

## 2022-12-20 RX ORDER — ATORVASTATIN CALCIUM 40 MG/1
40 TABLET, FILM COATED ORAL EVERY EVENING
Qty: 100 TABLET | Refills: 3 | Status: SHIPPED
Start: 2022-12-20 | End: 2023-01-26

## 2022-12-20 ASSESSMENT — FIBROSIS 4 INDEX: FIB4 SCORE: 0.59

## 2022-12-20 ASSESSMENT — ENCOUNTER SYMPTOMS
LOSS OF CONSCIOUSNESS: 0
BRUISES/BLEEDS EASILY: 0
INSOMNIA: 0
FEVER: 0
MYALGIAS: 0
NERVOUS/ANXIOUS: 1
COUGH: 0
DIZZINESS: 0
ABDOMINAL PAIN: 0
SHORTNESS OF BREATH: 0
PALPITATIONS: 0
HEADACHES: 0
PND: 0
CHILLS: 0
NAUSEA: 0
ORTHOPNEA: 0

## 2022-12-20 NOTE — PROGRESS NOTES
Chief Complaint   Patient presents with    Follow-Up    Possible Stroke    Atrial Fibrillation    Anticoagulation    HTN (Controlled)    Hyperlipidemia       Subjective     Jess Root is a 62 y.o. female who presents today for three month follow-up of CVA, AFib, anticoagulation, HTN and hyperlipidemia.    Jess is a 62 year old female with history of hypothyroidism and hyperlipidemia, and new onset AFib with RVR in March 2022. She was treated with Metoprolol and ASA only ([id number] score 1 = female).     In August 2022, she presented to [facility] with right and left arm weakness/numbness, and MRI confirmed left front infarct. She was treated with tPA, and started on Xarelto for anticoagulation. BP has been stable on Metoprolol only. At follow-up with me in September 2022, she was doing quite well.     She is here today for three month follow-up. She is making more and more progress, and peripheral numbness and weakness is improving; she does still tire quire easily. She denies any chest pain, pressure, tightness or discomfort; no shortness of breath, orthopnea or PND; no dizziness or syncope; no LE edema. No episodes of palpitations. She does have some mild ongoing anxiety. Synthroid was lowered from 112 to 88mcg per her PCP.    Past Medical History:   Diagnosis Date    Atrial fibrillation (HCC) 08/2022    Echocardiogram with normal LV size, LVEF 65-70%. No valvular abnormalities.    Chronic anticoagulation     History of stroke 08/2022    MRI with left frontal infarct.    HSV-2 infection     Hyperlipidemia     Osteopenia     Pseudocholinesterase deficiency     Thyroid disease nodules - thyroidectomy    Varicose veins of both lower extremities      Past Surgical History:   Procedure Laterality Date    VEIN STRIPPING  2005    ABDOMINAL HYSTERECTOMY TOTAL  2005    MENISCUS REPAIR      right knee 9/2017 - Dr. Johnson    THYROIDECTOMY       Family History   Problem Relation Age of Onset    Diabetes Mother     Heart  Disease Mother     Stroke Mother     Heart Disease Father      Social History     Socioeconomic History    Marital status:      Spouse name: Not on file    Number of children: Not on file    Years of education: Not on file    Highest education level: Not on file   Occupational History    Not on file   Tobacco Use    Smoking status: Never    Smokeless tobacco: Never   Vaping Use    Vaping Use: Never used   Substance and Sexual Activity    Alcohol use: No    Drug use: No    Sexual activity: Not on file     Comment: , two kids   Other Topics Concern    Not on file   Social History Narrative    Not on file     Social Determinants of Health     Financial Resource Strain: Not on file   Food Insecurity: Not on file   Transportation Needs: Not on file   Physical Activity: Not on file   Stress: Not on file   Social Connections: Not on file   Intimate Partner Violence: Not on file   Housing Stability: Not on file     Allergies   Allergen Reactions    Other Misc      Anesthesia-- pseudocholinesterace    Succinylcholine      PSEUDOCHOLINE DEFICIENCY     Outpatient Encounter Medications as of 12/20/2022   Medication Sig Dispense Refill    atorvastatin (LIPITOR) 40 MG Tab Take 1 Tablet by mouth every evening. 100 Tablet 3    rivaroxaban (XARELTO) 20 MG Tab tablet Take 1 Tablet by mouth with dinner. 90 Tablet 2    metoprolol SR (TOPROL XL) 25 MG TABLET SR 24 HR Take 1 Tablet by mouth every day. 100 Tablet 3    furosemide (LASIX) 20 MG Tab Take 1 Tablet by mouth 1 time a day as needed (swelling). 30 Tablet 3    levothyroxine (SYNTHROID) 88 MCG Tab TAKE ONE TABLET BY MOUTH EVERY MORNING ON AN EMPTY STOMACH 6 d per week, 1/2 one d per week 90 Tablet 1    [DISCONTINUED] levothyroxine (SYNTHROID) 112 MCG Tab  (Patient not taking: Reported on 12/20/2022)      [DISCONTINUED] atorvastatin (LIPITOR) 40 MG Tab Take 1 Tablet by mouth every evening. 30 Tablet 2     No facility-administered encounter medications on file as of  12/20/2022.     Review of Systems   Constitutional:  Positive for malaise/fatigue. Negative for chills and fever.   HENT:  Negative for congestion.    Respiratory:  Negative for cough and shortness of breath.    Cardiovascular:  Negative for chest pain, palpitations, orthopnea, leg swelling and PND.   Gastrointestinal:  Negative for abdominal pain and nausea.   Musculoskeletal:  Negative for myalgias.   Skin:  Negative for rash.   Neurological:  Negative for dizziness, loss of consciousness and headaches.   Endo/Heme/Allergies:  Does not bruise/bleed easily.   Psychiatric/Behavioral:  The patient is nervous/anxious. The patient does not have insomnia.             Objective     /80 (BP Location: Left arm, Patient Position: Sitting, BP Cuff Size: Adult)   Pulse 73   Resp 16   Ht 1.524 m (5')   Wt 69.4 kg (153 lb)   SpO2 99%   BMI 29.88 kg/m²     Physical Exam  Constitutional:       Appearance: She is well-developed.   HENT:      Head: Normocephalic.   Neck:      Vascular: No JVD.      Comments: Scar of previous thyroidectomy.  Cardiovascular:      Rate and Rhythm: Normal rate and regular rhythm.      Heart sounds: Normal heart sounds.   Pulmonary:      Effort: Pulmonary effort is normal. No respiratory distress.      Breath sounds: Normal breath sounds. No wheezing or rales.   Abdominal:      General: Bowel sounds are normal. There is no distension.      Palpations: Abdomen is soft.      Tenderness: There is no abdominal tenderness.   Musculoskeletal:         General: Normal range of motion.      Cervical back: Normal range of motion and neck supple.   Skin:     General: Skin is warm and dry.      Findings: No rash.   Neurological:      Mental Status: She is alert and oriented to person, place, and time.      Comments: No obvious neurological deficits. Speech is smooth, and no overt weakness noted.     FINDINGS OF MRI OF 8/9/2022:  There is focal area of restricted diffusion in the left frontal cortex  adjacent to the anterior falx in territory of left anterior cerebral artery distribution.  There is also focal area of restricted diffusion in the right parietal vertex in the cortex and in the right centrum semiovale deep white matter and more inferiorly in the right frontal parietal cortex.  This area is in the distribution of right anterior cerebral artery branches.  There are no extra-axial fluid collections.  Vascular flow void of vessels at the base of the brain is normal with dominant left vertebral artery noted.  Ventricular system is normal in size and configuration.  There are mild foci of increased T2 signal in the periventricular white matter consistent with minimal small vessel ischemic changes and gliosis.  Following infusion of gadolinium contrast there is no evidence of abnormal enhancement.  No evidence of acute hemorrhage is seen.    Echocardiogram of 8/3/2022:  1. The left ventricle is normal in size, thickness, and systolic function. Ejection fraction of 65-70% by Yuen's Biplane. There is grade 1 (impaired relaxation) diastolic function with normal LV filling pressures.   2. No significant valvular abnormalities.   3. Normal right ventricular size, wall thickness and function.   4. Saline bubble study performed with no evidence of interatrial shunt.      LABS AS OF 8/3/2022:  Cholesterol 125 - 200 mg/dL 240 High     Triglycerides 0 - 150 mg/dL 73    HDL CHOLESTEROL >=50.0 mg/dL 64.0    LDL CHOLESTEROL CALCULATED <=100 mg/dL 161 High     Cardiac Risk 0.1 - 5.0 RATIO 3.8    VLDL 6 - 30 mg/dL 14.6      Lab Results   Component Value Date/Time    SODIUM 138 08/14/2022 05:20 AM    POTASSIUM 3.8 08/14/2022 05:20 AM    CHLORIDE 103 08/14/2022 05:20 AM    CO2 23 08/14/2022 05:20 AM    GLUCOSE 90 08/14/2022 05:20 AM    BUN 15 08/14/2022 05:20 AM    CREATININE 0.69 08/14/2022 05:20 AM    BUNCREATRAT 18.6 08/08/2022 04:23 AM      Assessment & Plan     1. Ischemic stroke (HCC)  atorvastatin (LIPITOR) 40 MG  Tab    Comp Metabolic Panel    Lipid Profile      2. Paroxysmal atrial fibrillation (HCC)        3. Atrial fibrillation with RVR (HCC)        4. Cerebrovascular accident (CVA) due to embolism of precerebral artery (HCC)        5. PAF (paroxysmal atrial fibrillation) (HCC)        6. Chronic anticoagulation        7. Essential hypertension        8. Dyslipidemia        9. Postoperative hypothyroidism            Medical Decision Making: Today's Assessment/Status/Plan:      1. Ischemic CVA in August 2022, on Xarelto and statin. She has made good progress with rehab/PT/OT and speech therapy. She has also been seen by the [facility].    2. Paroxysmal atrial fibrillation, in sinus rhythm on Metoprolol. She did purchase a NeedFeed device; she will get it set up.    3. Chronic anticoagulation with Xarelto. No bleeding problems.    4. Hypertension, treated and stable.    5. Hyperlipidemia, treated with statin. We will repeat fasting CMP and lipid panel.    6. Hypothyroidism, treated, non on Synthroid 88mcg. She will repeat TSH per PCP.    Same medications for now. She is making good progress, and has made a lot of improvements. Follow-up in 3 months, sooner if clinical condition changes.

## 2022-12-22 ENCOUNTER — OFFICE VISIT (OUTPATIENT)
Dept: MEDICAL GROUP | Facility: LAB | Age: 62
End: 2022-12-22
Payer: COMMERCIAL

## 2022-12-22 ENCOUNTER — HOSPITAL ENCOUNTER (OUTPATIENT)
Dept: LAB | Facility: MEDICAL CENTER | Age: 62
End: 2022-12-22
Attending: NURSE PRACTITIONER
Payer: COMMERCIAL

## 2022-12-22 VITALS
SYSTOLIC BLOOD PRESSURE: 132 MMHG | BODY MASS INDEX: 30.23 KG/M2 | TEMPERATURE: 98.3 F | OXYGEN SATURATION: 94 % | HEIGHT: 60 IN | RESPIRATION RATE: 12 BRPM | HEART RATE: 70 BPM | WEIGHT: 154 LBS | DIASTOLIC BLOOD PRESSURE: 68 MMHG

## 2022-12-22 DIAGNOSIS — E03.8 OTHER SPECIFIED HYPOTHYROIDISM: ICD-10-CM

## 2022-12-22 DIAGNOSIS — J98.01 COUGH DUE TO BRONCHOSPASM: ICD-10-CM

## 2022-12-22 DIAGNOSIS — K21.00 GASTROESOPHAGEAL REFLUX DISEASE WITH ESOPHAGITIS WITHOUT HEMORRHAGE: ICD-10-CM

## 2022-12-22 DIAGNOSIS — Z86.73 HISTORY OF STROKE: ICD-10-CM

## 2022-12-22 DIAGNOSIS — H00.15 CHALAZION LEFT LOWER EYELID: ICD-10-CM

## 2022-12-22 PROCEDURE — 99214 OFFICE O/P EST MOD 30 MIN: CPT | Performed by: INTERNAL MEDICINE

## 2022-12-22 PROCEDURE — 84439 ASSAY OF FREE THYROXINE: CPT

## 2022-12-22 PROCEDURE — 36415 COLL VENOUS BLD VENIPUNCTURE: CPT

## 2022-12-22 PROCEDURE — 84443 ASSAY THYROID STIM HORMONE: CPT

## 2022-12-22 RX ORDER — OMEPRAZOLE 20 MG/1
20 CAPSULE, DELAYED RELEASE ORAL DAILY
Qty: 30 CAPSULE | Refills: 0 | Status: SHIPPED
Start: 2022-12-22 | End: 2023-01-25

## 2022-12-22 ASSESSMENT — FIBROSIS 4 INDEX: FIB4 SCORE: 0.59

## 2022-12-23 LAB
T4 FREE SERPL-MCNC: 1.79 NG/DL (ref 0.93–1.7)
TSH SERPL DL<=0.005 MIU/L-ACNC: 2.11 UIU/ML (ref 0.38–5.33)

## 2022-12-23 NOTE — PROGRESS NOTES
CC: Jess Root is a 62 y.o. female is suffering from   Chief Complaint   Patient presents with    Eye Problem     Left eye problem         SUBJECTIVE:  1. History of stroke  Jess is here for follow-up has a history of stroke secondary to atrial fibrillation, is doing reasonably well is currently anticoagulated    2. Cough due to bronchospasm  Patient with a cough questionable etiology states that she awakens for single morning with coughing, states there are times when she is laughing when she will cough    3. Gastroesophageal reflux disease with esophagitis without hemorrhage  Discussed with the patient possible gastroesophageal reflux leading to coughing    4. Chalazion left lower eyelid  Chalazion left lower eyelid        Past social, family, history:   Social History     Tobacco Use    Smoking status: Never    Smokeless tobacco: Never   Vaping Use    Vaping Use: Never used   Substance Use Topics    Alcohol use: No    Drug use: No         MEDICATIONS:    Current Outpatient Medications:     omeprazole (PRILOSEC) 20 MG delayed-release capsule, Take 1 Capsule by mouth every day., Disp: 30 Capsule, Rfl: 0    atorvastatin (LIPITOR) 40 MG Tab, Take 1 Tablet by mouth every evening., Disp: 100 Tablet, Rfl: 3    rivaroxaban (XARELTO) 20 MG Tab tablet, Take 1 Tablet by mouth with dinner., Disp: 90 Tablet, Rfl: 2    metoprolol SR (TOPROL XL) 25 MG TABLET SR 24 HR, Take 1 Tablet by mouth every day., Disp: 100 Tablet, Rfl: 3    furosemide (LASIX) 20 MG Tab, Take 1 Tablet by mouth 1 time a day as needed (swelling)., Disp: 30 Tablet, Rfl: 3    levothyroxine (SYNTHROID) 88 MCG Tab, TAKE ONE TABLET BY MOUTH EVERY MORNING ON AN EMPTY STOMACH 6 d per week, 1/2 one d per week, Disp: 90 Tablet, Rfl: 1    PROBLEMS:  Patient Active Problem List    Diagnosis Date Noted    Late effect of stroke 12/05/2022    Chronic anticoagulation 09/20/2022    Erythrocytosis 08/11/2022    Oral thrush 08/10/2022    Impaired mobility and  ADLs 08/10/2022    Emotional lability 08/10/2022    PAF (paroxysmal atrial fibrillation) ([facility]) 08/04/2022    Essential hypertension 08/04/2022    Dyslipidemia 08/04/2022    Cerebrovascular accident ([facility]) 08/02/2022    H/O basal cell carcinoma excision 10/14/2021    Vitamin D deficiency 04/19/2018    Acute meniscal tear of knee 12/13/2016    Seasonal allergic rhinitis 12/13/2016    HSV-2 infection 01/29/2015    Superficial thrombophlebitis 04/01/2010    Hypothyroid 07/19/2009    Varicose veins of both lower extremities 07/19/2009       REVIEW OF SYSTEMS:  Gen.:  No Nausea, Vomiting, fever, Chills.  Heart: No chest pain.  Lungs:  No shortness of Breath.  Psychological: All unusual Anxiety depression     PHYSICAL EXAM   Constitutional: Alert, cooperative, not in acute distress.  Cardiovascular:  Rate Rhythm is regular without murmurs rubs clicks.     Thorax & Lungs: Clear to auscultation, no wheezing, rhonchi, or rales  HENT: Normocephalic, Atraumatic.  Eyes: PERRLA, EOMI, Conjunctiva normal.   Neck: Trachia is midline no swelling of the thyroid.   Lymphatic: No lymphadenopathy noted.   Musculoskeletal: Left  weaker than right   Neurologic: Alert & oriented x 3, cranial nerves II through XII are intact, abnormal motor function, Normal sensory function.   Psychiatric: Affect normal, Judgment normal, Mood normal.     VITAL SIGNS:/68   Pulse 70   Temp 36.8 °C (98.3 °F) (Temporal)   Resp 12   Ht 1.524 m (5')   Wt 69.9 kg (154 lb)   SpO2 94%   BMI 30.08 kg/m²     Labs: Reviewed    Assessment:                                                     Plan:    1. History of stroke  History of stroke with left hand weakness    2. Cough due to bronchospasm  Cough questionably due to bronchospasm    3. Gastroesophageal reflux disease with esophagitis without hemorrhage  Start omeprazole if this is not successful then evaluate for other sources of possible coughing including reactive airway disease postnasal  drip  - omeprazole (PRILOSEC) 20 MG delayed-release capsule; Take 1 Capsule by mouth every day.  Dispense: 30 Capsule; Refill: 0    4. Chalazion left lower eyelid  Warm compresses handout from up-to-date given to the patient

## 2022-12-26 DIAGNOSIS — E03.8 OTHER SPECIFIED HYPOTHYROIDISM: ICD-10-CM

## 2022-12-26 RX ORDER — LEVOTHYROXINE SODIUM 88 UG/1
TABLET ORAL
Qty: 90 TABLET | Refills: 1
Start: 2022-12-26 | End: 2022-12-27 | Stop reason: SDUPTHER

## 2022-12-27 DIAGNOSIS — E03.8 OTHER SPECIFIED HYPOTHYROIDISM: ICD-10-CM

## 2022-12-27 RX ORDER — LEVOTHYROXINE SODIUM 88 UG/1
TABLET ORAL
Qty: 90 TABLET | Refills: 1 | Status: SHIPPED
Start: 2022-12-27 | End: 2023-02-06

## 2022-12-27 NOTE — TELEPHONE ENCOUNTER
----- Message from RONAN RodasPMÓNICA sent at 12/26/2022  4:34 PM PST -----  Please call Jess  T4 (amount of thyroid hormone circulating) is still a bit high.  Ok to skip the one day per week that she was only taking 1/2 pill and continue taking whole pill 6 days per week.  Please feel free to email with any questions,  Ruchi

## 2023-01-23 ENCOUNTER — TELEPHONE (OUTPATIENT)
Dept: MEDICAL GROUP | Facility: LAB | Age: 63
End: 2023-01-23
Payer: COMMERCIAL

## 2023-01-23 DIAGNOSIS — E89.0 POSTOPERATIVE HYPOTHYROIDISM: ICD-10-CM

## 2023-01-23 NOTE — TELEPHONE ENCOUNTER
1. Caller Name: TONY                        Call Back Number: [phone] (home)         How would the patient prefer to be contacted with a response: Phone call OK to leave a detailed message    Pt asking if you want her to do any labs prior to appt. Usually you place a thyroid order for her. She has a lab appt on Wednesday.

## 2023-01-23 NOTE — TELEPHONE ENCOUNTER
Thyroid lab work ordered.  She may also want to do labs for her cardiologist, Alisa Fleming which are in epic but that will require her to fast for 8 to 10 hours prior to having labs drawn

## 2023-01-25 ENCOUNTER — HOSPITAL ENCOUNTER (OUTPATIENT)
Dept: LAB | Facility: MEDICAL CENTER | Age: 63
End: 2023-01-25
Attending: NURSE PRACTITIONER
Payer: COMMERCIAL

## 2023-01-25 ENCOUNTER — OFFICE VISIT (OUTPATIENT)
Dept: MEDICAL GROUP | Facility: LAB | Age: 63
End: 2023-01-25
Payer: COMMERCIAL

## 2023-01-25 VITALS
RESPIRATION RATE: 16 BRPM | WEIGHT: 152 LBS | TEMPERATURE: 97.1 F | DIASTOLIC BLOOD PRESSURE: 84 MMHG | OXYGEN SATURATION: 95 % | SYSTOLIC BLOOD PRESSURE: 136 MMHG | BODY MASS INDEX: 29.84 KG/M2 | HEIGHT: 60 IN | HEART RATE: 76 BPM

## 2023-01-25 DIAGNOSIS — E89.0 POSTOPERATIVE HYPOTHYROIDISM: ICD-10-CM

## 2023-01-25 DIAGNOSIS — I10 ESSENTIAL HYPERTENSION: ICD-10-CM

## 2023-01-25 DIAGNOSIS — K21.00 GASTROESOPHAGEAL REFLUX DISEASE WITH ESOPHAGITIS WITHOUT HEMORRHAGE: ICD-10-CM

## 2023-01-25 DIAGNOSIS — I69.30 LATE EFFECT OF STROKE: ICD-10-CM

## 2023-01-25 DIAGNOSIS — I63.9 ISCHEMIC STROKE (HCC): ICD-10-CM

## 2023-01-25 LAB
ALBUMIN SERPL BCP-MCNC: 4.4 G/DL (ref 3.2–4.9)
ALBUMIN/GLOB SERPL: 1.5 G/DL
ALP SERPL-CCNC: 211 U/L (ref 30–99)
ALT SERPL-CCNC: 78 U/L (ref 2–50)
ANION GAP SERPL CALC-SCNC: 10 MMOL/L (ref 7–16)
AST SERPL-CCNC: 51 U/L (ref 12–45)
BILIRUB SERPL-MCNC: 0.8 MG/DL (ref 0.1–1.5)
BUN SERPL-MCNC: 14 MG/DL (ref 8–22)
CALCIUM ALBUM COR SERPL-MCNC: 8.9 MG/DL (ref 8.5–10.5)
CALCIUM SERPL-MCNC: 9.2 MG/DL (ref 8.5–10.5)
CHLORIDE SERPL-SCNC: 104 MMOL/L (ref 96–112)
CHOLEST SERPL-MCNC: 193 MG/DL (ref 100–199)
CO2 SERPL-SCNC: 25 MMOL/L (ref 20–33)
CREAT SERPL-MCNC: 0.61 MG/DL (ref 0.5–1.4)
FASTING STATUS PATIENT QL REPORTED: NORMAL
GFR SERPLBLD CREATININE-BSD FMLA CKD-EPI: 99 ML/MIN/1.73 M 2
GLOBULIN SER CALC-MCNC: 2.9 G/DL (ref 1.9–3.5)
GLUCOSE SERPL-MCNC: 90 MG/DL (ref 65–99)
HDLC SERPL-MCNC: 68 MG/DL
LDLC SERPL CALC-MCNC: 110 MG/DL
POTASSIUM SERPL-SCNC: 4 MMOL/L (ref 3.6–5.5)
PROT SERPL-MCNC: 7.3 G/DL (ref 6–8.2)
SODIUM SERPL-SCNC: 139 MMOL/L (ref 135–145)
T4 FREE SERPL-MCNC: 1.89 NG/DL (ref 0.93–1.7)
TRIGL SERPL-MCNC: 75 MG/DL (ref 0–149)
TSH SERPL DL<=0.005 MIU/L-ACNC: 1.42 UIU/ML (ref 0.38–5.33)

## 2023-01-25 PROCEDURE — 99214 OFFICE O/P EST MOD 30 MIN: CPT | Performed by: NURSE PRACTITIONER

## 2023-01-25 PROCEDURE — 36415 COLL VENOUS BLD VENIPUNCTURE: CPT

## 2023-01-25 PROCEDURE — 80053 COMPREHEN METABOLIC PANEL: CPT

## 2023-01-25 PROCEDURE — 84443 ASSAY THYROID STIM HORMONE: CPT

## 2023-01-25 PROCEDURE — 84439 ASSAY OF FREE THYROXINE: CPT

## 2023-01-25 PROCEDURE — 80061 LIPID PANEL: CPT

## 2023-01-25 RX ORDER — OMEPRAZOLE 20 MG/1
40 CAPSULE, DELAYED RELEASE ORAL DAILY
Qty: 30 CAPSULE | Refills: 0
Start: 2023-01-25 | End: 2023-01-27 | Stop reason: SDUPTHER

## 2023-01-25 ASSESSMENT — PATIENT HEALTH QUESTIONNAIRE - PHQ9: CLINICAL INTERPRETATION OF PHQ2 SCORE: 0

## 2023-01-25 ASSESSMENT — FIBROSIS 4 INDEX: FIB4 SCORE: 0.59

## 2023-01-25 NOTE — PROGRESS NOTES
"CC  Follow-up      HPI:  Jess is a 62-year-old established female here to follow-up on hypothyroidism, history of stroke, hypertension and cough.  Last labs showed a TSH of 2.1 and T4 of 1.79 in December.  She did repeat labs today but these have not been resulted yet.  She continues on 80 migraine levothyroxine 6 days/week and skips 1 day/week.  She is a bit worried about weight gain and some hair loss but is not as physically active as she used to be and has been under a lot of stress for the past 6 months following her stroke.    HTN:  chronic issue.  Taking metoprolol 25 mg XL daily.  Home readings:  range 102/55 - 121/59 with HR around 50-65.  Denies chest pain.    Hx of stroke: Stroke was 6 months ago in August.  Still very tired easily.  Needs a note that she can't return to work.  Struggles with short term memory - \"it's shot.\"  Left hand weakness / difficulty with dexterity.  Walking better but legs get weak easily.  Speech is improving but left side of mouth feels numb and inhibits some word formation.      Cough:  present since stroke.  Saw Dr. Fenton and was given omeprazole 20 mg 12/2022 - this helps but cough persists in the morning a few d per week and she is wondering if she can take a higher dose to get rid of the cough.      Exam:  /84 (BP Location: Left arm, Patient Position: Sitting, BP Cuff Size: Large adult)   Pulse 76   Temp 36.2 °C (97.1 °F)   Resp 16   Ht 1.524 m (5')   Wt 68.9 kg (152 lb)   SpO2 95%    Gen: NAD  Resp: nonlabored.  Able to speak in full sentences  Psy: pleasant / cooperative.   Neuro:  Alert and oriented x 3    A/P:  1. Late effect of stroke  -still unable to return to work r/t short term memory /extremity weakness/speech difficulty.  Will re-evaluate her in 3 months.  Encouraged her to try to return to a physical exercise program as tolerated (at home) and walking near home.      2. Essential hypertension  -stable.  Continue metoprolol 25 mg XL daily.  " Recheck pt in 3 months.     3. Postoperative hypothyroidism  -will see lab results tomorrow and notify pt via telephone regarding any changes that need to be made in her levothyroxine dosage.    4. Gastroesophageal reflux disease with esophagitis without hemorrhage  -trial of increasing omeprazole to 40 mg for a week and have her f/u with me via telephone if this is not reducing incidence of cough.   - omeprazole (PRILOSEC) 20 MG delayed-release capsule; Take 2 Capsules by mouth every day.  Dispense: 30 Capsule; Refill: 0      Seeing derm tomorrow for a few lesions on head - [facility] at [facility].

## 2023-01-25 NOTE — LETTER
January 25, 2023       Patient: Jess Root   YOB: 1960   Date of Visit: 1/25/2023         To Whom It May Concern:    Jess has significant residual neurological deficits related to CVA 8/2/2022 and is unable to return for work for another 3 months.  She is currently scheduled to return to work 1/26/2023 but we need to extend this through 4/26/2023 at which point she will be re-evaluated in our office.    If you have any questions or concerns, please don't hesitate to call [phone]          Sincerely,          JANET Rodas.P.TROY.  Electronically Signed

## 2023-01-26 ENCOUNTER — TELEPHONE (OUTPATIENT)
Dept: CARDIOLOGY | Facility: MEDICAL CENTER | Age: 63
End: 2023-01-26
Payer: COMMERCIAL

## 2023-01-26 DIAGNOSIS — E78.5 DYSLIPIDEMIA: ICD-10-CM

## 2023-01-26 DIAGNOSIS — I63.10 CEREBROVASCULAR ACCIDENT (CVA) DUE TO EMBOLISM OF PRECEREBRAL ARTERY (HCC): ICD-10-CM

## 2023-01-26 RX ORDER — ROSUVASTATIN CALCIUM 20 MG/1
20 TABLET, COATED ORAL EVERY EVENING
Qty: 90 TABLET | Refills: 0 | Status: SHIPPED
Start: 2023-01-26 | End: 2023-03-10

## 2023-01-26 NOTE — TELEPHONE ENCOUNTER
AB recommendations noted below:  Received: Today  BESSY Alexis R.N.  Patient does not have MyChart account.     Her cholesterol is BETTER, but her liver function is up.   Ideally, she is on a statin due to history of CVA.     I'd like to switch her from Lipitor 40mg to Crestor 20mg.   Repeat CMP and lipid panel in 4 weeks.   I'm hoping lower dose of a stronger statin will give us good lipid response, but improvement in her LFTs.     Thanks, AB      Called patient to discuss above. Patient verbally understands above information and is appreciative of call.     Crestor RX sent. Labs ordered and mailed to patient to be done in 4 weeks.

## 2023-01-27 DIAGNOSIS — K21.00 GASTROESOPHAGEAL REFLUX DISEASE WITH ESOPHAGITIS WITHOUT HEMORRHAGE: ICD-10-CM

## 2023-01-27 NOTE — TELEPHONE ENCOUNTER
Received request via: Pharmacy    Was the patient seen in the last year in this department? Yes  1/25/2023  Does the patient have an active prescription (recently filled or refills available) for medication(s) requested? No    Does the patient have retirement Plus and need 100 day supply (blood pressure, diabetes and cholesterol meds only)? Patient does not have SCP

## 2023-01-29 RX ORDER — OMEPRAZOLE 20 MG/1
40 CAPSULE, DELAYED RELEASE ORAL DAILY
Qty: 60 CAPSULE | Refills: 2 | Status: SHIPPED | OUTPATIENT
Start: 2023-01-29 | End: 2023-03-15 | Stop reason: SDUPTHER

## 2023-02-06 ENCOUNTER — TELEPHONE (OUTPATIENT)
Dept: MEDICAL GROUP | Facility: LAB | Age: 63
End: 2023-02-06
Payer: COMMERCIAL

## 2023-02-06 DIAGNOSIS — E89.0 POSTOPERATIVE HYPOTHYROIDISM: ICD-10-CM

## 2023-02-06 RX ORDER — LEVOTHYROXINE SODIUM 0.07 MG/1
75 TABLET ORAL
Qty: 30 TABLET | Refills: 5 | Status: SHIPPED | OUTPATIENT
Start: 2023-02-06 | End: 2023-03-23 | Stop reason: SDUPTHER

## 2023-02-06 NOTE — TELEPHONE ENCOUNTER
Please let her know that I reduced her levothyroxine to 75 mcg daily and she should recheck her thyroid labs in about 6 weeks.  Let me know if she has a lot of 88 mcg left over and we can try to work with what she has at home.

## 2023-02-06 NOTE — TELEPHONE ENCOUNTER
Pt was given lab results from Alisa Fleming [facility].  Thyroid is still a bit overmedicated.  You have definitely been skipping 1 day/week, correct?  Let me know if there are several weeks that you forget to skip a pill.  Also, your liver is newly irritated.  If I remember correctly, you do not drink alcohol and you are not taking Tylenol on a regular basis, correct?  We need to recheck this with a liver ultrasound if your liver is persistently irritated on recheck in 2 weeks.    Let me know -   Pt may have taken 3 tylenol within a 6 month period.

## 2023-03-09 ENCOUNTER — HOSPITAL ENCOUNTER (OUTPATIENT)
Dept: LAB | Facility: MEDICAL CENTER | Age: 63
End: 2023-03-09
Attending: NURSE PRACTITIONER
Payer: COMMERCIAL

## 2023-03-09 DIAGNOSIS — E89.0 POSTOPERATIVE HYPOTHYROIDISM: ICD-10-CM

## 2023-03-09 DIAGNOSIS — E78.5 DYSLIPIDEMIA: ICD-10-CM

## 2023-03-09 LAB
ALBUMIN SERPL BCP-MCNC: 4.4 G/DL (ref 3.2–4.9)
ALBUMIN/GLOB SERPL: 1.6 G/DL
ALP SERPL-CCNC: 110 U/L (ref 30–99)
ALT SERPL-CCNC: 27 U/L (ref 2–50)
ANION GAP SERPL CALC-SCNC: 10 MMOL/L (ref 7–16)
AST SERPL-CCNC: 22 U/L (ref 12–45)
BILIRUB SERPL-MCNC: 0.8 MG/DL (ref 0.1–1.5)
BUN SERPL-MCNC: 14 MG/DL (ref 8–22)
CALCIUM ALBUM COR SERPL-MCNC: 9 MG/DL (ref 8.5–10.5)
CALCIUM SERPL-MCNC: 9.3 MG/DL (ref 8.5–10.5)
CHLORIDE SERPL-SCNC: 100 MMOL/L (ref 96–112)
CHOLEST SERPL-MCNC: 206 MG/DL (ref 100–199)
CO2 SERPL-SCNC: 26 MMOL/L (ref 20–33)
CREAT SERPL-MCNC: 0.76 MG/DL (ref 0.5–1.4)
FASTING STATUS PATIENT QL REPORTED: NORMAL
GFR SERPLBLD CREATININE-BSD FMLA CKD-EPI: 87 ML/MIN/1.73 M 2
GLOBULIN SER CALC-MCNC: 2.8 G/DL (ref 1.9–3.5)
GLUCOSE SERPL-MCNC: 93 MG/DL (ref 65–99)
HDLC SERPL-MCNC: 74 MG/DL
LDLC SERPL CALC-MCNC: 118 MG/DL
POTASSIUM SERPL-SCNC: 4 MMOL/L (ref 3.6–5.5)
PROT SERPL-MCNC: 7.2 G/DL (ref 6–8.2)
SODIUM SERPL-SCNC: 136 MMOL/L (ref 135–145)
T4 FREE SERPL-MCNC: 1.54 NG/DL (ref 0.93–1.7)
TRIGL SERPL-MCNC: 72 MG/DL (ref 0–149)
TSH SERPL DL<=0.005 MIU/L-ACNC: 2.76 UIU/ML (ref 0.38–5.33)

## 2023-03-09 PROCEDURE — 84439 ASSAY OF FREE THYROXINE: CPT

## 2023-03-09 PROCEDURE — 84443 ASSAY THYROID STIM HORMONE: CPT

## 2023-03-09 PROCEDURE — 36415 COLL VENOUS BLD VENIPUNCTURE: CPT

## 2023-03-09 PROCEDURE — 80061 LIPID PANEL: CPT

## 2023-03-09 PROCEDURE — 80053 COMPREHEN METABOLIC PANEL: CPT

## 2023-03-10 ENCOUNTER — TELEPHONE (OUTPATIENT)
Dept: CARDIOLOGY | Facility: MEDICAL CENTER | Age: 63
End: 2023-03-10
Payer: COMMERCIAL

## 2023-03-10 DIAGNOSIS — E78.5 DYSLIPIDEMIA: ICD-10-CM

## 2023-03-10 DIAGNOSIS — I63.10 CEREBROVASCULAR ACCIDENT (CVA) DUE TO EMBOLISM OF PRECEREBRAL ARTERY (HCC): ICD-10-CM

## 2023-03-10 RX ORDER — ROSUVASTATIN CALCIUM 40 MG/1
40 TABLET, COATED ORAL EVERY EVENING
Qty: 30 TABLET | Refills: 11 | Status: SHIPPED | OUTPATIENT
Start: 2023-03-10 | End: 2023-06-29 | Stop reason: SDUPTHER

## 2023-03-10 RX ORDER — EZETIMIBE 10 MG/1
10 TABLET ORAL DAILY
Qty: 30 TABLET | Refills: 11 | Status: SHIPPED | OUTPATIENT
Start: 2023-03-10 | End: 2023-12-05 | Stop reason: SDUPTHER

## 2023-03-10 NOTE — TELEPHONE ENCOUNTER
Phone Number Called: [phone]    Call outcome: Spoke to patient regarding message below.    Message: Called to inform patient of AB recommendations:     ----- Message from BESSY Alexis sent at 3/10/2023 10:07 AM PST -----  LDL goal is <70 with her history of CVA. LDL is better, but still not at goal.  IF she is willing, let's increase Crestor from 20mg to 40mg, and ADD some Zetia 10mg once daily.  Repeat CMP and lipid panel in 1 month.  Thanks, AB    Patient verbalized understanding. Patient also asking if she is able to take Coenzyme Q10 and if she should have blood work completed before next appointment. RN to reach out to AB.     No further questions at this time.

## 2023-03-13 NOTE — TELEPHONE ENCOUNTER
I would wait at least a month from starting the Zetia before repeating labs (getting them done before 4/3/2023 will be too soon). Thanks.  And yes, taking CoEnzyme Q10 is just fine.  Thanks, AB

## 2023-03-14 ENCOUNTER — TELEPHONE (OUTPATIENT)
Dept: MEDICAL GROUP | Facility: LAB | Age: 63
End: 2023-03-14
Payer: COMMERCIAL

## 2023-03-14 NOTE — TELEPHONE ENCOUNTER
----- Message from BESSY Rodas sent at 3/11/2023  9:14 AM PST -----  Thyroid labs look great -please let stone know

## 2023-03-15 ENCOUNTER — OFFICE VISIT (OUTPATIENT)
Dept: MEDICAL GROUP | Facility: LAB | Age: 63
End: 2023-03-15
Payer: COMMERCIAL

## 2023-03-15 VITALS
TEMPERATURE: 97.4 F | BODY MASS INDEX: 30.23 KG/M2 | DIASTOLIC BLOOD PRESSURE: 64 MMHG | SYSTOLIC BLOOD PRESSURE: 112 MMHG | WEIGHT: 154 LBS | OXYGEN SATURATION: 95 % | HEIGHT: 60 IN | RESPIRATION RATE: 16 BRPM | HEART RATE: 84 BPM

## 2023-03-15 DIAGNOSIS — E89.0 POSTOPERATIVE HYPOTHYROIDISM: ICD-10-CM

## 2023-03-15 DIAGNOSIS — I10 ESSENTIAL HYPERTENSION: ICD-10-CM

## 2023-03-15 DIAGNOSIS — E78.5 DYSLIPIDEMIA: ICD-10-CM

## 2023-03-15 DIAGNOSIS — K21.00 GASTROESOPHAGEAL REFLUX DISEASE WITH ESOPHAGITIS WITHOUT HEMORRHAGE: ICD-10-CM

## 2023-03-15 PROCEDURE — 99214 OFFICE O/P EST MOD 30 MIN: CPT | Performed by: NURSE PRACTITIONER

## 2023-03-15 RX ORDER — OMEPRAZOLE 20 MG/1
20 CAPSULE, DELAYED RELEASE ORAL 2 TIMES DAILY
Qty: 60 CAPSULE | Refills: 5
Start: 2023-03-15 | End: 2023-06-29 | Stop reason: SDUPTHER

## 2023-03-15 ASSESSMENT — FIBROSIS 4 INDEX: FIB4 SCORE: 0.75

## 2023-03-15 NOTE — PROGRESS NOTES
Chief Complaint   Patient presents with    Medication Management           HPI:  Jess is a 63 yo est female here to f/u:     #1- Cough:  present x a month.  moved omeprazole to bid from 2 in the morning and cough has improved.  Cough started after she had a stroke last fall.    #2- Hyperlipidemia:  would like to discuss recent lipid panel and cardiology recommendation of increasing rosuvastatin / adding zetia.  Newly eating a mediterranean diet and going to enter exercise again.  Has had 20 pound weight gain with recent stroke.    #3- Hypothyroid :  chronic issue.  Labs WNL on 88 mcg levothyroxine.  Wants to discuss labs.    Component      Latest Ref Rng 3/9/2023   TSH      0.380 - 5.330 uIU/mL 2.760    Free T-4      0.93 - 1.70 ng/dL 1.54      Exam:  /64 (BP Location: Left arm, Patient Position: Sitting, BP Cuff Size: Large adult)   Pulse 84   Temp 36.3 °C (97.4 °F)   Resp 16   Ht 1.524 m (5')   Wt 69.9 kg (154 lb)   SpO2 95%   Gen: NAD  Resp: nonlabored.  Able to speak in full sentences  Psy: pleasant / cooperative.   Neuro:  Alert and oriented x 3      A/P:  1. Gastroesophageal reflux disease with esophagitis without hemorrhage  -cough improved with moving omeprazole to 20 mg bid from 40 mg daily.    - omeprazole (PRILOSEC) 20 MG delayed-release capsule; Take 1 Capsule by mouth 2 times a day.  Dispense: 60 Capsule; Refill: 5    2. Postoperative hypothyroidism  -stable.  Continue 88 mcg levothyroxine.      3. Essential hypertension  -stable.  Continue metoprolol 25 mg nightly.     4. Dyslipidemia  -Reviewed recent lipid panels.  Discussed cardiology's recommendation to increase rosuvastatin to 40 mg and add Zetia.  She would like to try weight loss, exercise and a high-fiber, plant-based, mixed Mediterranean diet to see if she can get her LDL cholesterol down below 90, preferably around 70.  We discussed the risk versus benefit of this.  I encouraged her to keep her appointment with cardiology and  discuss further next month.  Discussed safety of increasing rosuvastatin and adding on Zetia.  She would like to add on coenzyme every 10 which is certainly acceptable.

## 2023-03-16 NOTE — TELEPHONE ENCOUNTER
Phone Number Called: [phone]    Call outcome: Left detailed message for patient. Informed to call back with any additional questions.    Message: Called to inform patient of AB recommendations listed before.     Unable to reach patient at this time. Left AB Recommendations on voicemail for patient.     Informed patient to call with any further questions.

## 2023-03-23 ENCOUNTER — OFFICE VISIT (OUTPATIENT)
Dept: MEDICAL GROUP | Facility: LAB | Age: 63
End: 2023-03-23
Payer: COMMERCIAL

## 2023-03-23 VITALS
SYSTOLIC BLOOD PRESSURE: 158 MMHG | DIASTOLIC BLOOD PRESSURE: 78 MMHG | RESPIRATION RATE: 16 BRPM | TEMPERATURE: 96.5 F | WEIGHT: 153 LBS | BODY MASS INDEX: 30.04 KG/M2 | HEART RATE: 62 BPM | HEIGHT: 60 IN | OXYGEN SATURATION: 94 %

## 2023-03-23 DIAGNOSIS — Z09 HOSPITAL DISCHARGE FOLLOW-UP: ICD-10-CM

## 2023-03-23 DIAGNOSIS — I10 ESSENTIAL HYPERTENSION: ICD-10-CM

## 2023-03-23 DIAGNOSIS — E89.0 POSTOPERATIVE HYPOTHYROIDISM: ICD-10-CM

## 2023-03-23 DIAGNOSIS — E78.5 DYSLIPIDEMIA: ICD-10-CM

## 2023-03-23 DIAGNOSIS — I48.0 PAROXYSMAL ATRIAL FIBRILLATION (HCC): ICD-10-CM

## 2023-03-23 PROBLEM — I48.91 A-FIB (HCC): Status: ACTIVE | Noted: 2022-08-04

## 2023-03-23 PROCEDURE — 99214 OFFICE O/P EST MOD 30 MIN: CPT | Performed by: NURSE PRACTITIONER

## 2023-03-23 RX ORDER — LEVOTHYROXINE SODIUM 0.07 MG/1
75 TABLET ORAL
Qty: 90 TABLET | Refills: 3 | Status: SHIPPED | OUTPATIENT
Start: 2023-03-23 | End: 2023-12-06 | Stop reason: SDUPTHER

## 2023-03-23 RX ORDER — TRIAMCINOLONE ACETONIDE 1 MG/G
1 OINTMENT TOPICAL 2 TIMES DAILY
Qty: 60 G | Refills: 0 | Status: SHIPPED | OUTPATIENT
Start: 2023-03-23

## 2023-03-23 RX ORDER — AMIODARONE HYDROCHLORIDE 200 MG/1
TABLET ORAL
COMMUNITY
Start: 2023-03-19 | End: 2023-03-23 | Stop reason: SDUPTHER

## 2023-03-23 RX ORDER — AMIODARONE HYDROCHLORIDE 200 MG/1
200 TABLET ORAL DAILY
Qty: 30 TABLET | Refills: 0 | Status: SHIPPED
Start: 2023-03-23 | End: 2023-04-27

## 2023-03-23 ASSESSMENT — FIBROSIS 4 INDEX: FIB4 SCORE: 1.09

## 2023-03-23 NOTE — PROGRESS NOTES
Hospital f/u      HPI:  Jess is a 62-year-old established female who presented to [place] ER with heart palpitations on March 18 that started after eating (mentions that she felt similar palpitations / racing the week prior while eating but this resolved on it's own).  In the ER she was in A-fib with RVR, converted to normal sinus rhythm after cardioversion and was placed on amiodarone drip x 24 hours, then released to home.  While hospitalized - negative chest x-ray, CBC, CMP, TSH and troponins.  She was eventually transitioned to oral amiodarone and continued on home metoprolol / Xarelto.  She was discharged to follow-up with cardiology and our office.  Recommendation for amiodarone was 200 mg 2 times daily for 7 days then 200 mg daily for 21 days until she sees cardiology.  She has an appointment to see cardiology on April 3.    Home BP readings: range 110-135/50's.  HR staying in the 50's.  Denies chest pain or trouble breathing.  Overall now feeling okay.  Would like to review recent lipid panels and thyroid studies over the last few years.  Doing okay on 40 mg rosuvastatin but has not started Zetia, would like to speak with cardiology about this prior to starting.    Exam:  BP (!) 158/78 (BP Location: Left arm, Patient Position: Sitting, BP Cuff Size: Large adult)   Pulse 62   Temp 35.8 °C (96.5 °F)   Resp 16   Ht 1.524 m (5')   Wt 69.4 kg (153 lb)   SpO2 94%   150/80 recheck by myself  Gen: NAD  Resp: nonlabored.  Able to speak in full sentences  Psy: pleasant / cooperative.   Neuro:  Alert and oriented x 3      A/P:    1. Hospital discharge follow-up        2. Paroxysmal atrial fibrillation (HCC)  amiodarone (CORDARONE) 200 MG Tab      3. Essential hypertension        4. Postoperative hypothyroidism        5. Dyslipidemia          Reviewed records in [facility] from [facility] ER and hospital stay with her in depth and answered any questions that she had.  She certainly plans on keeping her follow-up  with cardiology on April 3.  Tolerating amiodarone, denies any negative side effects and will transition down to 200 mg daily, prescription sent to her pharmacy.  She will continue metoprolol 25 mg XL at night, Xarelto with evening meal, rosuvastatin 40 mg at night, 75 mcg levothyroxine in the morning and she would like to discuss zetia with cardiology when she sees her on April 3.  I reviewed lipid panels with her over the past 10 years.  She is really enjoying a Mediterranean diet and plans on restarting an exercise program.  We discussed her blood pressure in depth today.  Recheck was still a bit high systolic.  She is diligent about checking home blood pressures and will contact me if home readings are consistently greater than 130/85.  She was a bit anxious today during the visit in regards to her recent hospitalization.    F/u with me in 3 months, sooner if needed.

## 2023-04-03 ENCOUNTER — OFFICE VISIT (OUTPATIENT)
Dept: CARDIOLOGY | Facility: MEDICAL CENTER | Age: 63
End: 2023-04-03
Payer: COMMERCIAL

## 2023-04-03 VITALS
WEIGHT: 150 LBS | BODY MASS INDEX: 29.45 KG/M2 | HEART RATE: 65 BPM | OXYGEN SATURATION: 96 % | SYSTOLIC BLOOD PRESSURE: 128 MMHG | RESPIRATION RATE: 16 BRPM | DIASTOLIC BLOOD PRESSURE: 68 MMHG | HEIGHT: 60 IN

## 2023-04-03 DIAGNOSIS — I48.0 PAROXYSMAL ATRIAL FIBRILLATION (HCC): ICD-10-CM

## 2023-04-03 DIAGNOSIS — I63.10 CEREBROVASCULAR ACCIDENT (CVA) DUE TO EMBOLISM OF PRECEREBRAL ARTERY (HCC): ICD-10-CM

## 2023-04-03 DIAGNOSIS — I10 ESSENTIAL HYPERTENSION: ICD-10-CM

## 2023-04-03 DIAGNOSIS — Z79.01 CHRONIC ANTICOAGULATION: ICD-10-CM

## 2023-04-03 DIAGNOSIS — E78.5 DYSLIPIDEMIA: ICD-10-CM

## 2023-04-03 DIAGNOSIS — E89.0 POSTOPERATIVE HYPOTHYROIDISM: ICD-10-CM

## 2023-04-03 PROCEDURE — 99214 OFFICE O/P EST MOD 30 MIN: CPT | Performed by: NURSE PRACTITIONER

## 2023-04-03 ASSESSMENT — ENCOUNTER SYMPTOMS
PALPITATIONS: 0
ABDOMINAL PAIN: 0
MYALGIAS: 0
NAUSEA: 0
ORTHOPNEA: 0
DIZZINESS: 0
HEADACHES: 0
INSOMNIA: 0
COUGH: 0
CHILLS: 0
SHORTNESS OF BREATH: 0
PND: 0
NERVOUS/ANXIOUS: 1
FEVER: 0
BRUISES/BLEEDS EASILY: 0
LOSS OF CONSCIOUSNESS: 0

## 2023-04-03 ASSESSMENT — FIBROSIS 4 INDEX: FIB4 SCORE: 1.09

## 2023-04-03 NOTE — PROGRESS NOTES
Chief Complaint   Patient presents with    Hospital Follow-up    Atrial Fibrillation    Anticoagulation    Possible Stroke    HTN (Controlled)    Hyperlipidemia       Subjective     Jess Root is a 62 y.o. female who presents today for hospital follow-up of AFib with RVR.    Jess is a 62 year old female with history of hypothyroidism and hyperlipidemia, and new onset AFib with RVR in March 2022. She was treated with Metoprolol and ASA only ([id number] score 1 = female).     In August 2022, she presented to [facility] with right and left arm weakness/numbness, and MRI confirmed left front infarct. She was treated with tPA, and started on Xarelto for anticoagulation. She was last seen by me in December 2022.    On 3/18/2023, she presented to [facility] with palpitations and fast HR; she was admitted for AFib with RVR. She was treated with IV Amiodarone and cardioverted. She was discharged home on tapering dose of Amiodarone.    Crestor was also recently increased for elevated lipids; Zetia was added too.    She is here today for hospital follow-up. She is generally doing well, and denies any recurrence of palpitations. She denies any chest pain, pressure, tightness or discomfort; no shortness of breath, orthopnea or PND; no dizziness or syncope; no LE edema. BP has been stable.    Past Medical History:   Diagnosis Date    Atrial fibrillation (HCC) 08/2022    Echocardiogram with normal LV size, LVEF 65-70%. No valvular abnormalities.    Chronic anticoagulation     History of stroke 08/2022    MRI with left frontal infarct.    HSV-2 infection     Hyperlipidemia     Osteopenia     Pseudocholinesterase deficiency     Thyroid disease nodules - thyroidectomy    Varicose veins of both lower extremities      Past Surgical History:   Procedure Laterality Date    VEIN STRIPPING  2005    ABDOMINAL HYSTERECTOMY TOTAL  2005    MENISCUS REPAIR      right knee 9/2017 - Dr. Johnson    THYROIDECTOMY       Family History   Problem  Relation Age of Onset    Diabetes Mother     Heart Disease Mother     Stroke Mother     Heart Disease Father      Social History     Socioeconomic History    Marital status:      Spouse name: Not on file    Number of children: Not on file    Years of education: Not on file    Highest education level: Not on file   Occupational History    Not on file   Tobacco Use    Smoking status: Never    Smokeless tobacco: Never   Vaping Use    Vaping Use: Never used   Substance and Sexual Activity    Alcohol use: No    Drug use: No    Sexual activity: Not on file     Comment: , two kids   Other Topics Concern    Not on file   Social History Narrative    Not on file     Social Determinants of Health     Financial Resource Strain: Not on file   Food Insecurity: Not on file   Transportation Needs: Not on file   Physical Activity: Not on file   Stress: Not on file   Social Connections: Not on file   Intimate Partner Violence: Not on file   Housing Stability: Not on file     Allergies   Allergen Reactions    Other Misc      Anesthesia-- pseudocholinesterace    Succinylcholine      PSEUDOCHOLINE DEFICIENCY     Outpatient Encounter Medications as of 4/3/2023   Medication Sig Dispense Refill    levothyroxine (SYNTHROID) 75 MCG Tab Take 1 Tablet by mouth every morning on an empty stomach. 90 Tablet 3    triamcinolone acetonide (KENALOG) 0.1 % Ointment Apply 1 Application. topically 2 times a day. To rash 60 g 0    omeprazole (PRILOSEC) 20 MG delayed-release capsule Take 1 Capsule by mouth 2 times a day. 60 Capsule 5    rosuvastatin (CRESTOR) 40 MG tablet Take 1 Tablet by mouth every evening. 30 Tablet 11    ezetimibe (ZETIA) 10 MG Tab Take 1 Tablet by mouth every day. 30 Tablet 11    rivaroxaban (XARELTO) 20 MG Tab tablet Take 1 Tablet by mouth with dinner. 90 Tablet 2    metoprolol SR (TOPROL XL) 25 MG TABLET SR 24 HR Take 1 Tablet by mouth every day. 100 Tablet 3    furosemide (LASIX) 20 MG Tab Take 1 Tablet by mouth 1  time a day as needed (swelling). 30 Tablet 3    amiodarone (CORDARONE) 200 MG Tab Take 1 Tablet by mouth every day. (Patient not taking: Reported on 4/3/2023) 30 Tablet 0     No facility-administered encounter medications on file as of 4/3/2023.     Review of Systems   Constitutional:  Negative for chills, fever and malaise/fatigue.   HENT:  Negative for congestion.    Respiratory:  Negative for cough and shortness of breath.    Cardiovascular:  Negative for chest pain, palpitations, orthopnea, leg swelling and PND.   Gastrointestinal:  Negative for abdominal pain and nausea.   Musculoskeletal:  Negative for myalgias.   Skin:  Negative for rash.   Neurological:  Negative for dizziness, loss of consciousness and headaches.   Endo/Heme/Allergies:  Does not bruise/bleed easily.   Psychiatric/Behavioral:  The patient is nervous/anxious. The patient does not have insomnia.             Objective     /68 (BP Location: Left arm, Patient Position: Sitting, BP Cuff Size: Adult)   Pulse 65   Resp 16   Ht 1.524 m (5')   Wt 68 kg (150 lb)   SpO2 96%   BMI 29.29 kg/m²     Physical Exam  Constitutional:       Appearance: She is well-developed.   HENT:      Head: Normocephalic.   Neck:      Vascular: No JVD.      Comments: Scar of previous thyroidectomy.  Cardiovascular:      Rate and Rhythm: Normal rate and regular rhythm.      Heart sounds: Normal heart sounds.   Pulmonary:      Effort: Pulmonary effort is normal. No respiratory distress.      Breath sounds: Normal breath sounds. No wheezing or rales.   Abdominal:      General: Bowel sounds are normal. There is no distension.      Palpations: Abdomen is soft.      Tenderness: There is no abdominal tenderness.   Musculoskeletal:         General: Normal range of motion.      Cervical back: Normal range of motion and neck supple.   Skin:     General: Skin is warm and dry.      Findings: No rash.   Neurological:      Mental Status: She is alert and oriented to person,  place, and time.      Comments: No obvious neurological deficits.      FINDINGS OF MRI OF 8/9/2022:  There is focal area of restricted diffusion in the left frontal cortex adjacent to the anterior falx in territory of left anterior cerebral artery distribution.  There is also focal area of restricted diffusion in the right parietal vertex in the cortex and in the right centrum semiovale deep white matter and more inferiorly in the right frontal parietal cortex.  This area is in the distribution of right anterior cerebral artery branches.  There are no extra-axial fluid collections.  Vascular flow void of vessels at the base of the brain is normal with dominant left vertebral artery noted.  Ventricular system is normal in size and configuration.  There are mild foci of increased T2 signal in the periventricular white matter consistent with minimal small vessel ischemic changes and gliosis.  Following infusion of gadolinium contrast there is no evidence of abnormal enhancement.  No evidence of acute hemorrhage is seen.     Echocardiogram of 8/3/2022:  1. The left ventricle is normal in size, thickness, and systolic function. Ejection fraction of 65-70% by Yuen's Biplane. There is grade 1 (impaired relaxation) diastolic function with normal LV filling pressures.   2. No significant valvular abnormalities.   3. Normal right ventricular size, wall thickness and function.   4. Saline bubble study performed with no evidence of interatrial shunt.      Component      Latest Ref Rn 3/19/2023   WBC      3.40 - 10.00 10*3/uL 6.50 (E)   RBC      3.90 - 5.03 10*6/uL 4.56 (E)   Hemoglobin      12.0 - 15.5 g/dL 14.7 (E)   Hematocrit      34.9 - 44.5 % 42.5 (E)   MCV      81.6 - 98.3 fL 93.3 (E)   MCH      27.5 - 33.2 pg 32.2 (E)   MCHC      33.4 - 35.5 g/dL 34.5 (E)   RDW      11.8 - 15.6 % 13.0 (E)   Platelet Count      150 - 450 10*3/uL 257 (E)   MPV      7.4 - 11.0 fL 8.2 (E)   Neutrophils-Polys      % 49.0 (E)    Lymphocytes      % 34.6 (E)   Monocytes      % 13.1 (E)   Eosinophils      % 2.4 (E)   Basophils      % 0.9 (E)   Neutrophils (Absolute)      1.7 - 7.0 10*3/uL 3.2 (E)   Lymphs (Absolute)      0.9 - 2.9 10*3/uL 2.2 (E)   Monos (Absolute)      0.3 - 0.9 10*3/uL 0.8 (E)   Eos (Absolute)      0.0 - 0.5 10*3/uL 0.2 (E)   Baso (Absolute)      0.0 - 0.2 10*3/uL 0.1 (E)      Component      Latest Ref Rng 3/19/2023   Sodium      136 - 145 mmol/L 141 (E)   Potassium      3.5 - 5.3 mmol/L 3.6 (E)   Chloride      98 - 110 mmol/L 109 (E)   Co2      20.0 - 31.0 mmol/L 23.0 (E)   Bun      6.0 - 24.0 mg/dL 11.0 (E)   Creatinine      0.5 - 1.0 mg/dL 0.7 (E)   Bun-Creatinine Ratio      10.0 - 20.0  15.7 (E)   Glucose, Bld      70 - 140 mg/dL 103 (E)   Calcium      8.6 - 10.4 mg/dL 8.9 (E)   AST(SGOT)      10 - 36 U/L 17 (E)   ALT(SGPT)      6 - 29 U/L 15 (E)   Alkaline Phosphatase      33 - 130 U/L 84 (E)   Total Protein      6.4 - 8.4 g/dL 6.2 (L) (E)   Albumin      3.6 - 5.1 g/dL 3.8 (E)   Total Bilirubin      0.2 - 1.2 mg/dL 0.9 (E)   Ag Ratio      0.8 - 2.0  1.6 (E)   Anion Gap      6 - 19 mmol/L 9.0 (E)   eGFR      >90.0 mL/min/1.73m*2 >90.0 (E)      TSH 0.400 - 4.500 uIU/mL 4.200      Lab Results   Component Value Date/Time    CHOLSTRLTOT 206 (H) 03/09/2023 09:32 AM     (H) 03/09/2023 09:32 AM    HDL 74 03/09/2023 09:32 AM    TRIGLYCERIDE 72 03/09/2023 09:32 AM          Assessment & Plan     1. Paroxysmal atrial fibrillation (HCC)        2. Chronic anticoagulation        3. Cerebrovascular accident (CVA) due to embolism of precerebral artery (HCC)        4. Essential hypertension        5. Dyslipidemia  Comp Metabolic Panel    Lipid Profile      6. Postoperative hypothyroidism            Medical Decision Making: Today's Assessment/Status/Plan:      1. Paroxysmal atrial fibrillation, with recent hospitalization for AFib with RVR, status post DCCV, now in sinus rhythm on Metoprolol only. She has finished her tapering  dose of Amiodarone. To monitor HR/rhythm via her [employer] mobile device.    2. Chronic anticoagulation with Xarelto, no bleeding problems.    3. History of CVA in August 2022, on Xarelto and statin. She has made very good progress with PT/OT and speech therapy.    4. Hypertension, treated and stable. BP is well controlled.    5. Hyperlipidemia, now on Crestor 40mg and Zetia 10mg; to repeat fasting lipiid panel. Goal is LDL <70.    6. Hypothyroidism, treated. Recent TSH was normal.    She is doing well, and remains stable today. To monitor HR and rhythm at home. She can take additional Metoprolol 25mg PRN sustained palpitations. I will see her back in 2 months, sooner if clinical conditoin changes.

## 2023-04-27 ENCOUNTER — TELEPHONE (OUTPATIENT)
Dept: MEDICAL GROUP | Facility: LAB | Age: 63
End: 2023-04-27

## 2023-04-27 ENCOUNTER — OFFICE VISIT (OUTPATIENT)
Dept: MEDICAL GROUP | Facility: LAB | Age: 63
End: 2023-04-27
Payer: COMMERCIAL

## 2023-04-27 VITALS
DIASTOLIC BLOOD PRESSURE: 70 MMHG | HEIGHT: 60 IN | TEMPERATURE: 97.2 F | SYSTOLIC BLOOD PRESSURE: 130 MMHG | BODY MASS INDEX: 30.43 KG/M2 | WEIGHT: 155 LBS | RESPIRATION RATE: 12 BRPM | OXYGEN SATURATION: 94 % | HEART RATE: 71 BPM

## 2023-04-27 DIAGNOSIS — Z74.09 IMPAIRED MOBILITY AND ADLS: ICD-10-CM

## 2023-04-27 DIAGNOSIS — I69.30 LATE EFFECT OF STROKE: ICD-10-CM

## 2023-04-27 DIAGNOSIS — I10 ESSENTIAL HYPERTENSION: ICD-10-CM

## 2023-04-27 DIAGNOSIS — Z86.73 HISTORY OF CVA (CEREBROVASCULAR ACCIDENT): ICD-10-CM

## 2023-04-27 DIAGNOSIS — Z12.31 ENCOUNTER FOR SCREENING MAMMOGRAM FOR BREAST CANCER: ICD-10-CM

## 2023-04-27 DIAGNOSIS — Z78.9 IMPAIRED MOBILITY AND ADLS: ICD-10-CM

## 2023-04-27 DIAGNOSIS — E89.0 POSTOPERATIVE HYPOTHYROIDISM: ICD-10-CM

## 2023-04-27 PROBLEM — R45.86 EMOTIONAL LABILITY: Status: RESOLVED | Noted: 2022-08-10 | Resolved: 2023-04-27

## 2023-04-27 PROBLEM — I63.9 CEREBROVASCULAR ACCIDENT (HCC): Status: RESOLVED | Noted: 2022-08-02 | Resolved: 2023-04-27

## 2023-04-27 PROCEDURE — 99214 OFFICE O/P EST MOD 30 MIN: CPT | Performed by: NURSE PRACTITIONER

## 2023-04-27 ASSESSMENT — FIBROSIS 4 INDEX: FIB4 SCORE: 1.09

## 2023-04-27 NOTE — LETTER
April 27, 2023       Patient: Jess Root   YOB: 1960   Date of Visit: 4/27/2023         To Whom It May Concern:    Jess has significant residual neurological deficits related to CVA 8/2/2022 and is unable to return for work for another 3 months.  She is currently scheduled to return to work 4/26/2023 but we need to extend this through 7/26/2023 at which point she will be re-evaluated in our office.    If you have any questions or concerns, please don't hesitate to call [phone]          Sincerely,          JANET Rodas.P.TROY.  Electronically Signed

## 2023-04-27 NOTE — PROGRESS NOTES
"CC  F/u      HPI:  Jess is a 61 yo est female here to f/u on chronic issues.  Left frontal infarct 8/2022.    Since our visit 1 month ago, patient has seen cardiology without any medication changes.  Has tapered off amiodarone.    I saw her on March 23 for hospital follow-up in regards to being back in A-fib with RVR.  Has not had to take any extra metoprolol at home.   Home BP readings around 115/70's.    Still on mediterranean diet - has not lost weight.    Continues to have slow cognitive function that bothers patient since stroke 8/2022.   Struggles to count / with word &name recall / focus / calculations.  Has hand weakness that is improving slowly with therapy exercises.  Tired easily. Long term memory returning.   Driving.    Does not feel that she could return to work yet.    Exam:  /70 (BP Location: Left arm, Patient Position: Sitting, BP Cuff Size: Adult)   Pulse 71   Temp 36.2 °C (97.2 °F) (Temporal)   Resp 12   Ht 1.524 m (5')   Wt 70.3 kg (155 lb)   SpO2 94%    Gen. appears healthy in no distress   Skin appropriate for sex and age   Neck trachea is midline  Lungs unlabored breathing  Heart regular rate and rhythm  Neuro gait and station normal   Psych appropriate, calm, interactive, well-groomed    A/P:  \"  1. History of CVA (cerebrovascular accident)        2. Encounter for screening mammogram for breast cancer  MA-SCREENING MAMMO BILAT W/TOMOSYNTHESIS W/CAD      3. Postoperative hypothyroidism  TSH    FREE THYROXINE      4. Essential hypertension        5. Late effect of stroke        6. Impaired mobility and ADLs        \"  Reviewed last clinical note with cardiology.  Continue all current medications.  Recommend updated mammogram.  She does have labs to do for cardiology in June and I added thyroid labs per patient request.  Reviewed last normal thyroid labs done at [facility] in March.  Encouraged her to continue with home OT and PT exercises.  Has a good support group with friends and " family.  Blood pressure stable and she will continue home blood pressure monitoring a few days per week, notify me and/or cardiology if she has readings consistently greater than 140/90.  Discussed return to ER precautions.  Work note written to keep her out through the end of July related to persistent late effects from her stroke.  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

## 2023-04-27 NOTE — TELEPHONE ENCOUNTER
Patient wanted to tell you one more thing that she forgot to mention to you. She did say that when she feels really anxious she gets pain in the middle of her chest. She dose deep breathing technique that she learned and then it goes away. It happens daily 25% of the time. Wanted you to be aware of this.

## 2023-06-03 ENCOUNTER — HOSPITAL ENCOUNTER (OUTPATIENT)
Dept: LAB | Facility: MEDICAL CENTER | Age: 63
End: 2023-06-03
Attending: NURSE PRACTITIONER
Payer: COMMERCIAL

## 2023-06-03 DIAGNOSIS — E89.0 POSTOPERATIVE HYPOTHYROIDISM: ICD-10-CM

## 2023-06-03 DIAGNOSIS — E78.5 DYSLIPIDEMIA: ICD-10-CM

## 2023-06-03 LAB
ALBUMIN SERPL BCP-MCNC: 4.2 G/DL (ref 3.2–4.9)
ALBUMIN/GLOB SERPL: 1.6 G/DL
ALP SERPL-CCNC: 87 U/L (ref 30–99)
ALT SERPL-CCNC: 46 U/L (ref 2–50)
ANION GAP SERPL CALC-SCNC: 11 MMOL/L (ref 7–16)
AST SERPL-CCNC: 30 U/L (ref 12–45)
BILIRUB SERPL-MCNC: 0.8 MG/DL (ref 0.1–1.5)
BUN SERPL-MCNC: 15 MG/DL (ref 8–22)
CALCIUM ALBUM COR SERPL-MCNC: 8.7 MG/DL (ref 8.5–10.5)
CALCIUM SERPL-MCNC: 8.9 MG/DL (ref 8.5–10.5)
CHLORIDE SERPL-SCNC: 103 MMOL/L (ref 96–112)
CHOLEST SERPL-MCNC: 154 MG/DL (ref 100–199)
CO2 SERPL-SCNC: 25 MMOL/L (ref 20–33)
CREAT SERPL-MCNC: 0.76 MG/DL (ref 0.5–1.4)
GFR SERPLBLD CREATININE-BSD FMLA CKD-EPI: 87 ML/MIN/1.73 M 2
GLOBULIN SER CALC-MCNC: 2.7 G/DL (ref 1.9–3.5)
GLUCOSE SERPL-MCNC: 101 MG/DL (ref 65–99)
HDLC SERPL-MCNC: 77 MG/DL
LDLC SERPL CALC-MCNC: 65 MG/DL
POTASSIUM SERPL-SCNC: 4.4 MMOL/L (ref 3.6–5.5)
PROT SERPL-MCNC: 6.9 G/DL (ref 6–8.2)
SODIUM SERPL-SCNC: 139 MMOL/L (ref 135–145)
T4 FREE SERPL-MCNC: 1.72 NG/DL (ref 0.93–1.7)
TRIGL SERPL-MCNC: 59 MG/DL (ref 0–149)
TSH SERPL DL<=0.005 MIU/L-ACNC: 4.51 UIU/ML (ref 0.38–5.33)

## 2023-06-03 PROCEDURE — 84439 ASSAY OF FREE THYROXINE: CPT

## 2023-06-03 PROCEDURE — 36415 COLL VENOUS BLD VENIPUNCTURE: CPT

## 2023-06-03 PROCEDURE — 84443 ASSAY THYROID STIM HORMONE: CPT

## 2023-06-03 PROCEDURE — 80061 LIPID PANEL: CPT

## 2023-06-03 PROCEDURE — 80053 COMPREHEN METABOLIC PANEL: CPT

## 2023-06-05 ENCOUNTER — OFFICE VISIT (OUTPATIENT)
Dept: CARDIOLOGY | Facility: MEDICAL CENTER | Age: 63
End: 2023-06-05
Attending: NURSE PRACTITIONER
Payer: COMMERCIAL

## 2023-06-05 ENCOUNTER — TELEPHONE (OUTPATIENT)
Dept: MEDICAL GROUP | Facility: LAB | Age: 63
End: 2023-06-05

## 2023-06-05 VITALS
BODY MASS INDEX: 31.41 KG/M2 | OXYGEN SATURATION: 98 % | HEIGHT: 60 IN | SYSTOLIC BLOOD PRESSURE: 128 MMHG | HEART RATE: 65 BPM | DIASTOLIC BLOOD PRESSURE: 82 MMHG | WEIGHT: 160 LBS | RESPIRATION RATE: 16 BRPM

## 2023-06-05 DIAGNOSIS — Z79.01 CHRONIC ANTICOAGULATION: ICD-10-CM

## 2023-06-05 DIAGNOSIS — I48.0 PAROXYSMAL ATRIAL FIBRILLATION (HCC): ICD-10-CM

## 2023-06-05 DIAGNOSIS — E78.5 DYSLIPIDEMIA: ICD-10-CM

## 2023-06-05 DIAGNOSIS — I10 ESSENTIAL HYPERTENSION: ICD-10-CM

## 2023-06-05 DIAGNOSIS — Z86.73 HISTORY OF STROKE: ICD-10-CM

## 2023-06-05 PROCEDURE — 3074F SYST BP LT 130 MM HG: CPT | Performed by: NURSE PRACTITIONER

## 2023-06-05 PROCEDURE — 99212 OFFICE O/P EST SF 10 MIN: CPT | Performed by: NURSE PRACTITIONER

## 2023-06-05 PROCEDURE — 99214 OFFICE O/P EST MOD 30 MIN: CPT | Performed by: NURSE PRACTITIONER

## 2023-06-05 PROCEDURE — 3079F DIAST BP 80-89 MM HG: CPT | Performed by: NURSE PRACTITIONER

## 2023-06-05 ASSESSMENT — FIBROSIS 4 INDEX: FIB4 SCORE: 1.1

## 2023-06-05 ASSESSMENT — ENCOUNTER SYMPTOMS
PND: 0
CHILLS: 0
COUGH: 0
ORTHOPNEA: 0
SHORTNESS OF BREATH: 0
INSOMNIA: 0
FEVER: 0
HEADACHES: 0
BRUISES/BLEEDS EASILY: 0
PALPITATIONS: 0
ABDOMINAL PAIN: 0
DIZZINESS: 0
LOSS OF CONSCIOUSNESS: 0
NERVOUS/ANXIOUS: 0
NAUSEA: 0
MYALGIAS: 0

## 2023-06-05 NOTE — PROGRESS NOTES
Chief Complaint   Patient presents with    Follow-Up    Atrial Fibrillation    Anticoagulation    HTN (Controlled)    Hyperlipidemia       Subjective     Jess Root is a 62 y.o. female who presents today for two month follow-up of history of CVA, PAFib, anticoagulation, HTN and hyperlipidemia.    Jess is a 62 year old female with history of hypothyroidism and hyperlipidemia, and new onset AFib with RVR in March 2022, previously treated with Metoprolol and ASA only ([id number] score 1 = female).     In August 2022, she presented to [facility] with right and left arm weakness/numbness, and MRI confirmed left front infarct. She was treated with tPA, and started on Xarelto for anticoagulation.     In March 2023, she presented to [facility] with palpitations and fast HR; she was admitted for AFib with RVR. She was treated with IV Amiodarone and cardioverted. She was discharged home on tapering dose of Amiodarone, which has now been discontinued. Crestor was also recently increased for elevated lipids and Zetia was added.     She is here today for two follow-up. She is generally doing well, and denies any recurrence of palpitations, but she feels a lot of fatigue on Crestor and Zetia; no myalgias. She denies any chest pain, pressure, tightness or discomfort; no shortness of breath, orthopnea or PND; no dizziness or syncope; no LE edema. BP has been stable. She is doing calisthenics at home, but not a lot of cardio exercise.    Past Medical History:   Diagnosis Date    Atrial fibrillation (HCC) 08/2022    Echocardiogram with normal LV size, LVEF 65-70%. No valvular abnormalities.    Chronic anticoagulation     History of stroke 08/2022    MRI with left frontal infarct.    HSV-2 infection     Hyperlipidemia     Osteopenia     Pseudocholinesterase deficiency     Thyroid disease nodules - thyroidectomy    Varicose veins of both lower extremities      Past Surgical History:   Procedure Laterality Date    VEIN STRIPPING  2005     ABDOMINAL HYSTERECTOMY TOTAL  2005    MENISCUS REPAIR      right knee 9/2017 - Dr. Alex ORONA       Family History   Problem Relation Age of Onset    Diabetes Mother     Heart Disease Mother     Stroke Mother     Heart Disease Father      Social History     Socioeconomic History    Marital status:      Spouse name: Not on file    Number of children: Not on file    Years of education: Not on file    Highest education level: Not on file   Occupational History    Not on file   Tobacco Use    Smoking status: Never    Smokeless tobacco: Never   Vaping Use    Vaping Use: Never used   Substance and Sexual Activity    Alcohol use: No    Drug use: No    Sexual activity: Not on file     Comment: , two kids   Other Topics Concern    Not on file   Social History Narrative    Not on file     Social Determinants of Health     Financial Resource Strain: Not on file   Food Insecurity: Not on file   Transportation Needs: Not on file   Physical Activity: Not on file   Stress: Not on file   Social Connections: Not on file   Intimate Partner Violence: Not on file   Housing Stability: Not on file     Allergies   Allergen Reactions    Other Misc      Anesthesia-- pseudocholinesterace    Succinylcholine      PSEUDOCHOLINE DEFICIENCY     Outpatient Encounter Medications as of 6/5/2023   Medication Sig Dispense Refill    levothyroxine (SYNTHROID) 75 MCG Tab Take 1 Tablet by mouth every morning on an empty stomach. 90 Tablet 3    triamcinolone acetonide (KENALOG) 0.1 % Ointment Apply 1 Application. topically 2 times a day. To rash 60 g 0    omeprazole (PRILOSEC) 20 MG delayed-release capsule Take 1 Capsule by mouth 2 times a day. 60 Capsule 5    rosuvastatin (CRESTOR) 40 MG tablet Take 1 Tablet by mouth every evening. 30 Tablet 11    ezetimibe (ZETIA) 10 MG Tab Take 1 Tablet by mouth every day. 30 Tablet 11    rivaroxaban (XARELTO) 20 MG Tab tablet Take 1 Tablet by mouth with dinner. 90 Tablet 2    metoprolol  SR (TOPROL XL) 25 MG TABLET SR 24 HR Take 1 Tablet by mouth every day. 100 Tablet 3    furosemide (LASIX) 20 MG Tab Take 1 Tablet by mouth 1 time a day as needed (swelling). 30 Tablet 3     No facility-administered encounter medications on file as of 6/5/2023.     Review of Systems   Constitutional:  Positive for malaise/fatigue. Negative for chills and fever.   HENT:  Negative for congestion.    Respiratory:  Negative for cough and shortness of breath.    Cardiovascular:  Negative for chest pain, palpitations, orthopnea, leg swelling and PND.   Gastrointestinal:  Negative for abdominal pain and nausea.   Musculoskeletal:  Negative for myalgias.   Skin:  Negative for rash.   Neurological:  Negative for dizziness, loss of consciousness and headaches.   Endo/Heme/Allergies:  Does not bruise/bleed easily.   Psychiatric/Behavioral:  The patient is not nervous/anxious and does not have insomnia.               Objective     /82 (BP Location: Left arm, Patient Position: Sitting, BP Cuff Size: Adult)   Pulse 65   Resp 16   Ht 1.524 m (5')   Wt 72.6 kg (160 lb)   SpO2 98%   BMI 31.25 kg/m²     Physical Exam  Constitutional:       Appearance: She is well-developed.   HENT:      Head: Normocephalic.   Neck:      Vascular: No JVD.      Comments: Scar of previous thyroidectomy.  Cardiovascular:      Rate and Rhythm: Normal rate and regular rhythm.      Heart sounds: Normal heart sounds.   Pulmonary:      Effort: Pulmonary effort is normal. No respiratory distress.      Breath sounds: Normal breath sounds. No wheezing or rales.   Abdominal:      General: Bowel sounds are normal. There is no distension.      Palpations: Abdomen is soft.      Tenderness: There is no abdominal tenderness.   Musculoskeletal:         General: Normal range of motion.      Cervical back: Normal range of motion and neck supple.   Skin:     General: Skin is warm and dry.      Findings: No rash.   Neurological:      Mental Status: She is alert  and oriented to person, place, and time.      Comments: No obvious neurological deficits.        FINDINGS OF MRI OF 8/9/2022:  There is focal area of restricted diffusion in the left frontal cortex adjacent to the anterior falx in territory of left anterior cerebral artery distribution.  There is also focal area of restricted diffusion in the right parietal vertex in the cortex and in the right centrum semiovale deep white matter and more inferiorly in the right frontal parietal cortex.  This area is in the distribution of right anterior cerebral artery branches.  There are no extra-axial fluid collections.  Vascular flow void of vessels at the base of the brain is normal with dominant left vertebral artery noted.  Ventricular system is normal in size and configuration.  There are mild foci of increased T2 signal in the periventricular white matter consistent with minimal small vessel ischemic changes and gliosis.  Following infusion of gadolinium contrast there is no evidence of abnormal enhancement.  No evidence of acute hemorrhage is seen.     Echocardiogram of 8/3/2022:  1. The left ventricle is normal in size, thickness, and systolic function. Ejection fraction of 65-70% by Yuen's Biplane. There is grade 1 (impaired relaxation) diastolic function with normal LV filling pressures.   2. No significant valvular abnormalities.   3. Normal right ventricular size, wall thickness and function.   4. Saline bubble study performed with no evidence of interatrial shunt.      Component      Latest Ref Rng 3/9/2023 6/3/2023   Cholesterol,Tot      100 - 199 mg/dL 206 (H)  154    Triglycerides      0 - 149 mg/dL 72  59    HDL      >=40 mg/dL 74  77    LDL      <100 mg/dL 118 (H)  65       Lab Results   Component Value Date/Time    SODIUM 139 06/03/2023 08:08 AM    POTASSIUM 4.4 06/03/2023 08:08 AM    CHLORIDE 103 06/03/2023 08:08 AM    CO2 25 06/03/2023 08:08 AM    GLUCOSE 101 (H) 06/03/2023 08:08 AM    BUN 15 06/03/2023  08:08 AM    CREATININE 0.76 06/03/2023 08:08 AM    BUNCREATRAT 15.7 03/19/2023 04:47 AM      Lab Results   Component Value Date/Time    WBC 6.50 03/19/2023 04:47 AM    WBC 7.4 08/14/2022 05:20 AM    RBC 4.56 03/19/2023 04:47 AM    RBC 4.78 08/14/2022 05:20 AM    HEMOGLOBIN 14.7 03/19/2023 04:47 AM    HEMOGLOBIN 15.3 08/14/2022 05:20 AM    HEMATOCRIT 42.5 03/19/2023 04:47 AM    HEMATOCRIT 44.1 08/14/2022 05:20 AM    MCV 93.3 03/19/2023 04:47 AM    MCV 92.3 08/14/2022 05:20 AM    MCH 32.2 03/19/2023 04:47 AM    MCH 32.0 08/14/2022 05:20 AM    MCHC 34.5 03/19/2023 04:47 AM    MCHC 34.7 08/14/2022 05:20 AM    MPV 8.2 03/19/2023 04:47 AM    MPV 10.0 08/14/2022 05:20 AM         Assessment & Plan     1. History of stroke        2. Paroxysmal atrial fibrillation (HCC)        3. Chronic anticoagulation        4. Essential hypertension        5. Dyslipidemia            Medical Decision Making: Today's Assessment/Status/Plan:      1. History of CVA in August 2022, doing well. She has made good progress with PT/OT/speech therapy. BP is well controlled, and lipids are now at goal. She is urged to continue with same medications, and continue with lifestyle modifications: diet, exercise, sleep, stress management.    2. PAFib in sinus rhythm on Toprol XL only. She is OFF of Amiodarone.    3. Chronic anticoagulation with Xarelto, no bleeding problems.    4. Hypertension, treated with Toprol XL and  Lasix PRN, stable. BP is well controlled.    5. Hyperlipidemia, treated with Crestor 40mg and Zetia 10mg; LDL was 65 (at goal); continue with same meds/doses.    She is doing well, and remains stable. Work to increase some cardio in her routine. Follow-up in 6 months, sooner if clinical condition changes.

## 2023-06-06 NOTE — TELEPHONE ENCOUNTER
----- Message from BESSY Rodas sent at 6/4/2023  4:01 PM PDT -----  Please call Jess,  T4 is slightly high but TSH is normal.  Ok to continue current dosage and recheck in 8-12 weeks.   Please feel free to email with any questions,  Ruchi

## 2023-06-16 ENCOUNTER — TELEPHONE (OUTPATIENT)
Dept: MEDICAL GROUP | Facility: LAB | Age: 63
End: 2023-06-16
Payer: COMMERCIAL

## 2023-06-16 NOTE — TELEPHONE ENCOUNTER
1. Caller Name: Jess Root                         Call Back Number: [phone] (home)        How would the patient prefer to be contacted with a response:     Patient LVM stating she has a dental appointment coming up. Her stroke was in August 2022. So it has not been a year. She needs teeth cleaned as well as a tooth worked on. Please advise

## 2023-06-19 NOTE — TELEPHONE ENCOUNTER
Left message for patient ok to have teeth cleaned. If a clearance is needed, I left our fax number for them to fax this to us.

## 2023-06-19 NOTE — TELEPHONE ENCOUNTER
Not quite sure what her question is.  It is okay to have her teeth cleaned and a tooth worked on if needed.  Will you find out more from her and if she needs anything for me such as a clearance.

## 2023-06-29 ENCOUNTER — OFFICE VISIT (OUTPATIENT)
Dept: MEDICAL GROUP | Facility: LAB | Age: 63
End: 2023-06-29
Payer: COMMERCIAL

## 2023-06-29 VITALS
TEMPERATURE: 97.1 F | SYSTOLIC BLOOD PRESSURE: 138 MMHG | DIASTOLIC BLOOD PRESSURE: 82 MMHG | WEIGHT: 157 LBS | OXYGEN SATURATION: 96 % | HEIGHT: 60 IN | BODY MASS INDEX: 30.82 KG/M2 | HEART RATE: 64 BPM | RESPIRATION RATE: 16 BRPM

## 2023-06-29 DIAGNOSIS — E89.0 POSTOPERATIVE HYPOTHYROIDISM: ICD-10-CM

## 2023-06-29 DIAGNOSIS — I63.10 CEREBROVASCULAR ACCIDENT (CVA) DUE TO EMBOLISM OF PRECEREBRAL ARTERY (HCC): ICD-10-CM

## 2023-06-29 DIAGNOSIS — K21.00 GASTROESOPHAGEAL REFLUX DISEASE WITH ESOPHAGITIS WITHOUT HEMORRHAGE: ICD-10-CM

## 2023-06-29 DIAGNOSIS — E78.5 DYSLIPIDEMIA: ICD-10-CM

## 2023-06-29 PROCEDURE — 3075F SYST BP GE 130 - 139MM HG: CPT | Performed by: NURSE PRACTITIONER

## 2023-06-29 PROCEDURE — 99214 OFFICE O/P EST MOD 30 MIN: CPT | Performed by: NURSE PRACTITIONER

## 2023-06-29 PROCEDURE — 3079F DIAST BP 80-89 MM HG: CPT | Performed by: NURSE PRACTITIONER

## 2023-06-29 RX ORDER — OMEPRAZOLE 20 MG/1
20 CAPSULE, DELAYED RELEASE ORAL 2 TIMES DAILY
Qty: 180 CAPSULE | Refills: 3 | Status: SHIPPED | OUTPATIENT
Start: 2023-06-29

## 2023-06-29 RX ORDER — ROSUVASTATIN CALCIUM 40 MG/1
40 TABLET, COATED ORAL EVERY EVENING
Qty: 90 TABLET | Refills: 3 | Status: SHIPPED | OUTPATIENT
Start: 2023-06-29

## 2023-06-29 RX ORDER — POTASSIUM CHLORIDE 750 MG/1
TABLET, FILM COATED, EXTENDED RELEASE ORAL
Qty: 90 TABLET | Refills: 3 | Status: SHIPPED | OUTPATIENT
Start: 2023-06-29

## 2023-06-29 RX ORDER — FUROSEMIDE 20 MG/1
20 TABLET ORAL
Qty: 90 TABLET | Refills: 3 | Status: SHIPPED | OUTPATIENT
Start: 2023-06-29

## 2023-06-29 ASSESSMENT — FIBROSIS 4 INDEX: FIB4 SCORE: 1.1

## 2023-06-29 NOTE — LETTER
June 29, 2023       Patient: Jess Root   YOB: 1960   Date of Visit: 6/29/2023         To Whom It May Concern:    Jess has significant residual neurological deficits related to CVA 8/2/2022 and is unable to return for work for another 3 months.  She is currently scheduled to return to work 7/26/2023 but we need to extend this through 11/26/2023 at which point she will be re-evaluated in our office.    If you have any questions or concerns, please don't hesitate to call [phone]          Sincerely,          JANET Rodas.P.TROY.  Electronically Signed

## 2023-06-29 NOTE — PROGRESS NOTES
CC  F/u      HPI:  Jess is a 62-year-old established female here for follow-up.  History of CVA (left frontal infarct August 2022), A-fib, hypertension, hyperlipidemia and chronically anticoagulated.  She did see cardiology on June 5 and no changes were made.  Has been unable to return to work since her stroke in August as she has been struggling with counting, word recollection, focus.  Takes her longer than she would like to come up with words or answer questions.  Get tired more easily since stroke - not improving.  She has been able to return to driving which is going fine.  BP at home is good - consistently below 120/80 with HR in 50's.  Walking for exercise and working in the yard.    Denies heart racing sensations.  Denies unintentional weight changes.    Exam:  /82 (BP Location: Right arm, Patient Position: Sitting, BP Cuff Size: Large adult)   Pulse 64   Temp 36.2 °C (97.1 °F)   Resp 16   Ht 1.524 m (5')   Wt 71.2 kg (157 lb)   SpO2 96%     Gen: NAD  Resp: nonlabored.  Able to speak in full sentences  CV RRR  Psy: pleasant / cooperative.   Neuro:  Alert and oriented x 3      A/P:  1. Gastroesophageal reflux disease with esophagitis without hemorrhage  omeprazole (PRILOSEC) 20 MG delayed-release capsule      2. Dyslipidemia  rosuvastatin (CRESTOR) 40 MG tablet      3. Cerebrovascular accident (CVA) due to embolism of precerebral artery (HCC)  rosuvastatin (CRESTOR) 40 MG tablet      4. Postoperative hypothyroidism  TSH    FREE THYROXINE         Continues to slowly improve.  Fortunately doing well driving.  Unable to return to work at this time as she is still struggling to count.  Fortunately she is able to pay her rent and normal bills around the house.  She has a good support system.  She is doing home physical therapy.  Vital signs are stable and she feels comfortable with all of her prescription medication.  We reviewed her thyroid labs and I would like for her to repeat this in just 3  months.  Denies heart palpitations.  She will certainly contact me if she begins to have any sensation of heart palpitations or losing weight unintentionally.  Bowels are moving well.  Letter faxed to [employer] that she will not be able to return for 3 more months.  I encouraged her to start utilizing math workbooks from 2nd-4th grade, do word puzzles, read and fortunately she is socializing frequently for conversational skills.    Reviewed notes from cardiology.  Follow-up 3 months.

## 2023-07-24 DIAGNOSIS — I48.91 ATRIAL FIBRILLATION WITH RVR (HCC): ICD-10-CM

## 2023-07-24 DIAGNOSIS — I48.0 PAROXYSMAL ATRIAL FIBRILLATION (HCC): ICD-10-CM

## 2023-07-24 RX ORDER — METOPROLOL SUCCINATE 25 MG/1
25 TABLET, EXTENDED RELEASE ORAL DAILY
Qty: 90 TABLET | Refills: 3 | Status: SHIPPED | OUTPATIENT
Start: 2023-07-24

## 2023-08-01 RX ORDER — VALACYCLOVIR HYDROCHLORIDE 500 MG/1
TABLET, FILM COATED ORAL
Qty: 60 TABLET | Refills: 11 | Status: SHIPPED | OUTPATIENT
Start: 2023-08-01

## 2023-08-07 DIAGNOSIS — I63.9 ISCHEMIC STROKE (HCC): ICD-10-CM

## 2023-08-07 DIAGNOSIS — I48.91 ATRIAL FIBRILLATION WITH RVR (HCC): ICD-10-CM

## 2023-08-07 RX ORDER — RIVAROXABAN 20 MG/1
TABLET, FILM COATED ORAL
Qty: 90 TABLET | Refills: 0 | Status: SHIPPED | OUTPATIENT
Start: 2023-08-07 | End: 2023-11-07

## 2023-08-08 ENCOUNTER — TELEPHONE (OUTPATIENT)
Dept: HEALTH INFORMATION MANAGEMENT | Facility: OTHER | Age: 63
End: 2023-08-08

## 2023-10-04 ENCOUNTER — OFFICE VISIT (OUTPATIENT)
Dept: MEDICAL GROUP | Facility: LAB | Age: 63
End: 2023-10-04
Payer: MEDICARE

## 2023-10-04 VITALS
HEART RATE: 66 BPM | RESPIRATION RATE: 16 BRPM | SYSTOLIC BLOOD PRESSURE: 140 MMHG | WEIGHT: 160 LBS | OXYGEN SATURATION: 97 % | TEMPERATURE: 96.6 F | HEIGHT: 60 IN | BODY MASS INDEX: 31.41 KG/M2 | DIASTOLIC BLOOD PRESSURE: 70 MMHG

## 2023-10-04 DIAGNOSIS — Z86.73 HISTORY OF STROKE: ICD-10-CM

## 2023-10-04 DIAGNOSIS — I48.0 PAROXYSMAL ATRIAL FIBRILLATION (HCC): ICD-10-CM

## 2023-10-04 DIAGNOSIS — Z78.0 ASYMPTOMATIC MENOPAUSE: ICD-10-CM

## 2023-10-04 DIAGNOSIS — I10 ESSENTIAL HYPERTENSION: ICD-10-CM

## 2023-10-04 DIAGNOSIS — Z79.01 CHRONIC ANTICOAGULATION: ICD-10-CM

## 2023-10-04 PROCEDURE — 3077F SYST BP >= 140 MM HG: CPT | Performed by: NURSE PRACTITIONER

## 2023-10-04 PROCEDURE — 99214 OFFICE O/P EST MOD 30 MIN: CPT | Performed by: NURSE PRACTITIONER

## 2023-10-04 PROCEDURE — 3078F DIAST BP <80 MM HG: CPT | Performed by: NURSE PRACTITIONER

## 2023-10-04 ASSESSMENT — FIBROSIS 4 INDEX: FIB4 SCORE: 1.12

## 2023-10-04 NOTE — LETTER
11/23/2023      Patient: Jess Rojo   YOB: 1960   Date of Visit: 10/4/2023         To Whom It May Concern:    Jess has significant residual neurological deficits related to CVA 8/2/2022 and is unable to return for work for another 3 months.  She is currently scheduled to return to work 11/26/2023 but we need to extend this through 2/19/2024 at which point she will be re-evaluated in our office.    If you have any questions or concerns, please don't hesitate to call [phone]          Sincerely,          JANET Rodas.P.TROY.  Electronically Signed

## 2023-10-04 NOTE — PROGRESS NOTES
CC  F/u      HPI:  Jess is a 63-year-old established female here for follow-up.  History of CVA in August 2022 and has been unable to return to work since her stroke related to difficulty counting, word recollection and focus.  She also gets tired much more easily since she had a stroke.  She has fortunately been able to return to driving which is going well.  Home blood pressure readings:117/70 - 130/70.    Headaches: mild with allergies periodically.   Vision doing well.     Hypothyroidism: Chronic issue.  Taking 75 mcg every day.  Last TSH was 4 but T4 was 1.7.  Due to recheck thyroid labs.  Denies heart palpitations or heart racing sensation unless she has sugar with caffeine together and the duration of heart palpitations is very short, a few seconds to minutes.    Paroxysmal atrial fibrillation: Chronic issue.  Compliant with Xarelto and metoprolol.  Again, denies any sensation of heart racing or heart palpitations unless she has caffeine and sugar at the same time which she states is very rare for her.  She is scheduled to see cardiology in December.  She denies chest pain.      Exam:   BP (!) 140/70 (BP Location: Right arm, Patient Position: Sitting, BP Cuff Size: Large adult)   Pulse 66   Temp 35.9 °C (96.6 °F)   Resp 16   Ht 1.524 m (5')   Wt 72.6 kg (160 lb)   SpO2 97%     Gen. appears healthy in no distress   Skin appropriate for sex and age   Neck trachea is midline  Lungs unlabored breathing  Heart regular rate and rhythm  Neuro gait and station normal.  She is alert and oriented x3.  She does frequently forget what she would like to ask me in the room.  Her word recollection has certainly improved over the past year although she still takes a minute to remember certain words that she would like to say.  Psych appropriate, calm, interactive, well-groomed      Assessment / Plan:    1. Asymptomatic menopause  DS-BONE DENSITY STUDY (DEXA)      2. Chronic anticoagulation        3. Essential  hypertension        4. Paroxysmal atrial fibrillation (HCC)        5. History of stroke          Recommend updated mammogram with bone density.    Continue all current prescription medication, no changes were made today.      Recommend updated TSH with T4 in the next 1 to 2 weeks.    Overall she has had significant improvement following her stroke in August 2022.  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  I encouraged her to consider skilled nursing she states that she has filing for Social Security.  She is not having any problems driving but I encouraged her to refrain from driving at night or when she is tired.  I encouraged her to continue with efforts at reading, exercising and eating healthy.    Reminded her of her appointment with Alisa in December.    Follow-up in February.

## 2023-10-12 ENCOUNTER — APPOINTMENT (OUTPATIENT)
Dept: MEDICAL GROUP | Facility: LAB | Age: 63
End: 2023-10-12
Payer: MEDICARE

## 2023-11-06 DIAGNOSIS — I63.9 ISCHEMIC STROKE (HCC): ICD-10-CM

## 2023-11-06 DIAGNOSIS — I48.91 ATRIAL FIBRILLATION WITH RVR (HCC): ICD-10-CM

## 2023-11-07 RX ORDER — RIVAROXABAN 20 MG/1
TABLET, FILM COATED ORAL
Qty: 90 TABLET | Refills: 0 | Status: SHIPPED | OUTPATIENT
Start: 2023-11-07 | End: 2023-12-05 | Stop reason: SDUPTHER

## 2023-12-04 ENCOUNTER — APPOINTMENT (OUTPATIENT)
Dept: MEDICAL GROUP | Facility: LAB | Age: 63
End: 2023-12-04
Payer: MEDICARE

## 2023-12-05 ENCOUNTER — HOSPITAL ENCOUNTER (OUTPATIENT)
Dept: LAB | Facility: MEDICAL CENTER | Age: 63
End: 2023-12-05
Attending: NURSE PRACTITIONER
Payer: MEDICARE

## 2023-12-05 ENCOUNTER — OFFICE VISIT (OUTPATIENT)
Dept: CARDIOLOGY | Facility: MEDICAL CENTER | Age: 63
End: 2023-12-05
Attending: NURSE PRACTITIONER
Payer: MEDICARE

## 2023-12-05 VITALS
OXYGEN SATURATION: 95 % | DIASTOLIC BLOOD PRESSURE: 80 MMHG | WEIGHT: 160 LBS | SYSTOLIC BLOOD PRESSURE: 128 MMHG | BODY MASS INDEX: 31.41 KG/M2 | RESPIRATION RATE: 12 BRPM | HEIGHT: 60 IN | HEART RATE: 67 BPM

## 2023-12-05 DIAGNOSIS — E89.0 POSTOPERATIVE HYPOTHYROIDISM: ICD-10-CM

## 2023-12-05 DIAGNOSIS — I10 ESSENTIAL HYPERTENSION: ICD-10-CM

## 2023-12-05 DIAGNOSIS — K21.9 GASTROESOPHAGEAL REFLUX DISEASE WITHOUT ESOPHAGITIS: ICD-10-CM

## 2023-12-05 DIAGNOSIS — E78.5 DYSLIPIDEMIA: ICD-10-CM

## 2023-12-05 DIAGNOSIS — I63.10 CEREBROVASCULAR ACCIDENT (CVA) DUE TO EMBOLISM OF PRECEREBRAL ARTERY (HCC): ICD-10-CM

## 2023-12-05 DIAGNOSIS — I48.0 PAROXYSMAL ATRIAL FIBRILLATION (HCC): ICD-10-CM

## 2023-12-05 DIAGNOSIS — Z86.73 HISTORY OF STROKE: ICD-10-CM

## 2023-12-05 DIAGNOSIS — Z79.01 CHRONIC ANTICOAGULATION: ICD-10-CM

## 2023-12-05 DIAGNOSIS — I63.9 ISCHEMIC STROKE (HCC): ICD-10-CM

## 2023-12-05 PROCEDURE — 3074F SYST BP LT 130 MM HG: CPT | Performed by: NURSE PRACTITIONER

## 2023-12-05 PROCEDURE — 84443 ASSAY THYROID STIM HORMONE: CPT

## 2023-12-05 PROCEDURE — 99213 OFFICE O/P EST LOW 20 MIN: CPT | Performed by: NURSE PRACTITIONER

## 2023-12-05 PROCEDURE — 3079F DIAST BP 80-89 MM HG: CPT | Performed by: NURSE PRACTITIONER

## 2023-12-05 PROCEDURE — 99212 OFFICE O/P EST SF 10 MIN: CPT | Performed by: NURSE PRACTITIONER

## 2023-12-05 PROCEDURE — 99214 OFFICE O/P EST MOD 30 MIN: CPT | Performed by: NURSE PRACTITIONER

## 2023-12-05 PROCEDURE — 84439 ASSAY OF FREE THYROXINE: CPT

## 2023-12-05 PROCEDURE — 36415 COLL VENOUS BLD VENIPUNCTURE: CPT

## 2023-12-05 RX ORDER — EZETIMIBE 10 MG/1
10 TABLET ORAL DAILY
Qty: 100 TABLET | Refills: 3 | Status: SHIPPED | OUTPATIENT
Start: 2023-12-05

## 2023-12-05 ASSESSMENT — FIBROSIS 4 INDEX: FIB4 SCORE: 1.12

## 2023-12-05 ASSESSMENT — ENCOUNTER SYMPTOMS
INSOMNIA: 0
CHILLS: 0
FEVER: 0
PALPITATIONS: 0
LOSS OF CONSCIOUSNESS: 0
ABDOMINAL PAIN: 0
BRUISES/BLEEDS EASILY: 0
COUGH: 0
ORTHOPNEA: 0
HEADACHES: 0
MYALGIAS: 0
DIZZINESS: 0
NERVOUS/ANXIOUS: 0
NAUSEA: 0
SHORTNESS OF BREATH: 0
PND: 0

## 2023-12-05 NOTE — PROGRESS NOTES
Chief Complaint   Patient presents with    Follow-Up     History of CVA in 2022    Atrial Fibrillation    Anticoagulation    HTN (Controlled)    Hyperlipidemia       Subjective     Jess Rojo is a 63 y.o. female who presents today for six month follow-up of history of CVA, PAFib, anticoagulation, HTN, and hyperlipidemia.    Jess is a 63 year old female with history of hypothyroidism and hyperlipidemia, and new onset AFib with RVR in March 2022, previously treated with Metoprolol and ASA only ([id number] score 1 = female).     In August 2022, she presented to [facility] with right and left arm weakness/numbness, and MRI confirmed left front infarct. She was treated with tPA, and started on Xarelto for anticoagulation.      In March 2023, she presented to [facility] with palpitations and fast HR; she was admitted for AFib with RVR. She was treated with IV Amiodarone and cardioverted. She was discharged home on tapering dose of Amiodarone, which has now been discontinued. She is on Crestor and Zetia for her lipids.     She is here today for six month follow-up. She is generally doing well, and she has made good recovery from her CVA. She denies any chest pain, pressure, tightness or discomfort; no symptomatic palpitations; no shortness of breath, orthopnea or PND; no dizziness or syncope; no LE edema. BP is under good control at home.     Past Medical History:   Diagnosis Date    Atrial fibrillation (HCC) 08/2022    Echocardiogram with normal LV size, LVEF 65-70%. No valvular abnormalities.    Chronic anticoagulation     History of stroke 08/2022    MRI with left frontal infarct.    HSV-2 infection     Hyperlipidemia     Osteopenia     Pseudocholinesterase deficiency     Thyroid disease nodules - thyroidectomy    Varicose veins of both lower extremities      Past Surgical History:   Procedure Laterality Date    VEIN STRIPPING  2005    ABDOMINAL HYSTERECTOMY TOTAL  2005    MENISCUS REPAIR      right knee 9/2017 -   Alex ORONA       Family History   Problem Relation Age of Onset    Diabetes Mother     Heart Disease Mother     Stroke Mother     Heart Disease Father      Social History     Socioeconomic History    Marital status:      Spouse name: Not on file    Number of children: Not on file    Years of education: Not on file    Highest education level: Not on file   Occupational History    Not on file   Tobacco Use    Smoking status: Never    Smokeless tobacco: Never   Vaping Use    Vaping Use: Never used   Substance and Sexual Activity    Alcohol use: No    Drug use: No    Sexual activity: Not on file     Comment: , two kids   Other Topics Concern    Not on file   Social History Narrative    Not on file     Social Determinants of Health     Financial Resource Strain: Not on file   Food Insecurity: Not on file   Transportation Needs: Not on file   Physical Activity: Not on file   Stress: Not on file   Social Connections: Not on file   Intimate Partner Violence: Not on file   Housing Stability: Not on file     Allergies   Allergen Reactions    Other Misc      Anesthesia-- pseudocholinesterace    Succinylcholine      PSEUDOCHOLINE DEFICIENCY     Outpatient Encounter Medications as of 12/5/2023   Medication Sig Dispense Refill    ezetimibe (ZETIA) 10 MG Tab Take 1 Tablet by mouth every day. 100 Tablet 3    rivaroxaban (XARELTO) 20 MG Tab tablet Take 1 Tablet by mouth with dinner. 100 Tablet 3    valACYclovir (VALTREX) 500 MG Tab TAKE ONE TABLET BY MOUTH TWICE A DAY AS NEEDED FOR OUTBREAK 60 Tablet 11    metoprolol SR (TOPROL XL) 25 MG TABLET SR 24 HR Take 1 Tablet by mouth every day. 90 Tablet 3    omeprazole (PRILOSEC) 20 MG delayed-release capsule Take 1 Capsule by mouth 2 times a day. 180 Capsule 3    rosuvastatin (CRESTOR) 40 MG tablet Take 1 Tablet by mouth every evening. 90 Tablet 3    furosemide (LASIX) 20 MG Tab Take 1 Tablet by mouth 1 time a day as needed (swelling). 90 Tablet 3    potassium  chloride ER (KLOR-CON) 10 MEQ tablet Take one pill daily with lasix. 90 Tablet 3    levothyroxine (SYNTHROID) 75 MCG Tab Take 1 Tablet by mouth every morning on an empty stomach. 90 Tablet 3    triamcinolone acetonide (KENALOG) 0.1 % Ointment Apply 1 Application. topically 2 times a day. To rash 60 g 0    [DISCONTINUED] XARELTO 20 MG Tab tablet TAKE ONE TABLET BY MOUTH ONCE DAILY WITH DINNER 90 Tablet 0    [DISCONTINUED] ezetimibe (ZETIA) 10 MG Tab Take 1 Tablet by mouth every day. 30 Tablet 11     No facility-administered encounter medications on file as of 12/5/2023.     Review of Systems   Constitutional:  Negative for chills, fever and malaise/fatigue.   HENT:  Negative for congestion.    Respiratory:  Negative for cough and shortness of breath.    Cardiovascular:  Negative for chest pain, palpitations, orthopnea, leg swelling and PND.   Gastrointestinal:  Negative for abdominal pain and nausea.   Musculoskeletal:  Negative for myalgias.   Skin:  Negative for rash.   Neurological:  Negative for dizziness, loss of consciousness and headaches.   Endo/Heme/Allergies:  Does not bruise/bleed easily.   Psychiatric/Behavioral:  The patient is not nervous/anxious and does not have insomnia.               Objective     /80 (BP Location: Left arm, Patient Position: Sitting, BP Cuff Size: Adult)   Pulse 67   Resp 12   Ht 1.524 m (5')   Wt 72.6 kg (160 lb)   SpO2 95%   BMI 31.25 kg/m²     Physical Exam  Constitutional:       Appearance: She is well-developed.      Comments: Has made really good recovery from her CVA.   HENT:      Head: Normocephalic.   Neck:      Vascular: No JVD.      Comments: Scar of previous thyroidectomy.  Cardiovascular:      Rate and Rhythm: Normal rate and regular rhythm.      Heart sounds: Normal heart sounds.   Pulmonary:      Effort: Pulmonary effort is normal. No respiratory distress.      Breath sounds: Normal breath sounds. No wheezing or rales.   Abdominal:      General: Bowel  sounds are normal. There is no distension.      Palpations: Abdomen is soft.      Tenderness: There is no abdominal tenderness.   Musculoskeletal:         General: Normal range of motion.      Cervical back: Normal range of motion and neck supple.   Skin:     General: Skin is warm and dry.      Findings: No rash.   Neurological:      General: No focal deficit present.      Mental Status: She is alert and oriented to person, place, and time.      Comments: No obvious neurological deficits.        FINDINGS OF MRI OF 8/9/2022:  There is focal area of restricted diffusion in the left frontal cortex adjacent to the anterior falx in territory of left anterior cerebral artery distribution.  There is also focal area of restricted diffusion in the right parietal vertex in the cortex and in the right centrum semiovale deep white matter and more inferiorly in the right frontal parietal cortex.  This area is in the distribution of right anterior cerebral artery branches.  There are no extra-axial fluid collections.  Vascular flow void of vessels at the base of the brain is normal with dominant left vertebral artery noted.  Ventricular system is normal in size and configuration.  There are mild foci of increased T2 signal in the periventricular white matter consistent with minimal small vessel ischemic changes and gliosis.  Following infusion of gadolinium contrast there is no evidence of abnormal enhancement.  No evidence of acute hemorrhage is seen.     Echocardiogram of 8/3/2022:  1. The left ventricle is normal in size, thickness, and systolic function. Ejection fraction of 65-70% by Yuen's Biplane. There is grade 1 (impaired relaxation) diastolic function with normal LV filling pressures.   2. No significant valvular abnormalities.   3. Normal right ventricular size, wall thickness and function.   4. Saline bubble study performed with no evidence of interatrial shunt.      CONCLUSIONS OF TTE OF 2/28/2022:  No prior study  is available for comparison.   Normal left ventricular systolic function.   No evidence of valvular abnormality based on Doppler evaluation.     Lab Results   Component Value Date/Time    CHOLSTRLTOT 154 06/03/2023 08:08 AM    LDL 65 06/03/2023 08:08 AM    HDL 77 06/03/2023 08:08 AM    TRIGLYCERIDE 59 06/03/2023 08:08 AM        Lab Results   Component Value Date/Time    SODIUM 139 06/03/2023 08:08 AM    POTASSIUM 4.4 06/03/2023 08:08 AM    CHLORIDE 103 06/03/2023 08:08 AM    CO2 25 06/03/2023 08:08 AM    GLUCOSE 101 (H) 06/03/2023 08:08 AM    BUN 15 06/03/2023 08:08 AM    CREATININE 0.76 06/03/2023 08:08 AM    BUNCREATRAT 15.7 03/19/2023 04:47 AM     Lab Results   Component Value Date/Time    ASTSGOT 30 06/03/2023 08:08 AM    ALTSGPT 46 06/03/2023 08:08 AM       Lab Results   Component Value Date/Time    WBC 6.50 03/19/2023 04:47 AM    WBC 7.4 08/14/2022 05:20 AM    RBC 4.56 03/19/2023 04:47 AM    RBC 4.78 08/14/2022 05:20 AM    HEMOGLOBIN 14.7 03/19/2023 04:47 AM    HEMOGLOBIN 15.3 08/14/2022 05:20 AM    HEMATOCRIT 42.5 03/19/2023 04:47 AM    HEMATOCRIT 44.1 08/14/2022 05:20 AM    MCV 93.3 03/19/2023 04:47 AM    MCV 92.3 08/14/2022 05:20 AM    MCH 32.2 03/19/2023 04:47 AM    MCH 32.0 08/14/2022 05:20 AM    MCHC 34.5 03/19/2023 04:47 AM    MCHC 34.7 08/14/2022 05:20 AM    MPV 8.2 03/19/2023 04:47 AM    MPV 10.0 08/14/2022 05:20 AM         Assessment & Plan     1. Paroxysmal atrial fibrillation (HCC)  EC-ECHOCARDIOGRAM COMPLETE W/O CONT      2. Chronic anticoagulation  rivaroxaban (XARELTO) 20 MG Tab tablet      3. History of stroke        4. Ischemic stroke (HCC)  rivaroxaban (XARELTO) 20 MG Tab tablet      5. Cerebrovascular accident (CVA) due to embolism of precerebral artery (HCC)  ezetimibe (ZETIA) 10 MG Tab      6. Essential hypertension        7. Dyslipidemia  ezetimibe (ZETIA) 10 MG Tab      8. Postoperative hypothyroidism        9. Gastroesophageal reflux disease without esophagitis            Medical  Decision Making: Today's Assessment/Status/Plan:      1. PAFib in sinus rhythm on Toprol XL only. She is OFF of Amiodarone. She can take additional 12.5-25mg PRN sustained palpitations.    2. Chronic anticoagulation with Xarelto. No bleeding problems.    3. History of CVA in August 2022, doing very well. Her BP and lipids are well controlled; we will repeat echo.    4. Hypertension, treated with Toprol XL and PRN Lasix, stable. BP is good today.    5. Hyperlipidemia, treated with Crestor 40mg and Zetia 10mg; LDL is 65 (at goal).    6. Hypothyroidism, postoperative, on Synthroid. She did have a TSH done today, followed by PCP.    7. GERD, treated with Prilosec 20mg BID, stable.    She has done very well, and made very good progress. We talked about a couple of small motor skill exercises she can try at home. BP and lipids are well controlled. Same medications for now, repeat echo and follow-up in 6 months.

## 2023-12-06 ENCOUNTER — TELEPHONE (OUTPATIENT)
Dept: CARDIOLOGY | Facility: MEDICAL CENTER | Age: 63
End: 2023-12-06

## 2023-12-06 ENCOUNTER — OFFICE VISIT (OUTPATIENT)
Dept: MEDICAL GROUP | Facility: LAB | Age: 63
End: 2023-12-06
Payer: MEDICARE

## 2023-12-06 VITALS
HEIGHT: 60 IN | WEIGHT: 168 LBS | BODY MASS INDEX: 32.98 KG/M2 | SYSTOLIC BLOOD PRESSURE: 124 MMHG | TEMPERATURE: 97.1 F | DIASTOLIC BLOOD PRESSURE: 78 MMHG | OXYGEN SATURATION: 95 % | HEART RATE: 66 BPM | RESPIRATION RATE: 12 BRPM

## 2023-12-06 DIAGNOSIS — I48.0 PAROXYSMAL ATRIAL FIBRILLATION (HCC): ICD-10-CM

## 2023-12-06 DIAGNOSIS — E78.5 DYSLIPIDEMIA: ICD-10-CM

## 2023-12-06 DIAGNOSIS — I10 ESSENTIAL HYPERTENSION: ICD-10-CM

## 2023-12-06 DIAGNOSIS — Z74.09 IMPAIRED MOBILITY AND ADLS: ICD-10-CM

## 2023-12-06 DIAGNOSIS — E89.0 POSTOPERATIVE HYPOTHYROIDISM: ICD-10-CM

## 2023-12-06 DIAGNOSIS — Z78.9 IMPAIRED MOBILITY AND ADLS: ICD-10-CM

## 2023-12-06 DIAGNOSIS — E55.9 VITAMIN D DEFICIENCY: ICD-10-CM

## 2023-12-06 DIAGNOSIS — Z86.73 HISTORY OF STROKE: ICD-10-CM

## 2023-12-06 PROBLEM — B37.0 ORAL THRUSH: Status: RESOLVED | Noted: 2022-08-10 | Resolved: 2023-12-06

## 2023-12-06 LAB
T4 FREE SERPL-MCNC: 1.72 NG/DL (ref 0.93–1.7)
TSH SERPL DL<=0.005 MIU/L-ACNC: 2.89 UIU/ML (ref 0.38–5.33)

## 2023-12-06 PROCEDURE — 3074F SYST BP LT 130 MM HG: CPT | Performed by: NURSE PRACTITIONER

## 2023-12-06 PROCEDURE — 99214 OFFICE O/P EST MOD 30 MIN: CPT | Performed by: NURSE PRACTITIONER

## 2023-12-06 PROCEDURE — 3078F DIAST BP <80 MM HG: CPT | Performed by: NURSE PRACTITIONER

## 2023-12-06 RX ORDER — LEVOTHYROXINE SODIUM 0.07 MG/1
75 TABLET ORAL
Qty: 90 TABLET | Refills: 3 | Status: SHIPPED | OUTPATIENT
Start: 2023-12-06

## 2023-12-06 ASSESSMENT — FIBROSIS 4 INDEX: FIB4 SCORE: 1.12

## 2023-12-06 NOTE — TELEPHONE ENCOUNTER
----- Message from BESSY Alexis sent at 12/6/2023  9:42 AM PST -----  Please let her know that her thyroid function looks good - but I know her PCP, Ruchi, is watching this.  Thanks, AB

## 2023-12-06 NOTE — TELEPHONE ENCOUNTER
Tried to call pt at [phone]. No answer, left detailed msg on pt's personalized vm informing of message as stated by AB. Asked pt to f/u with PCP regarding results and call us if she has any questions.

## 2023-12-06 NOTE — PROGRESS NOTES
"CC  F/u    HPI:  Jess is a 63-year-old established female here for 3-month follow-up.  Overall tells me that she is doing really well.  She had a stroke in August 2022.  She has been unable to return to work since because of difficulty counting money, with word recollection and focus.  She gets tired very easily.  She is also followed by cardiology closely.  She saw cardiology yesterday and was continued on metoprolol.  She is off amiodarone.  She was continued on Xarelto, rosuvastatin and Zetia.  Repeat echocardiogram is scheduled, last was August 2022.  She is stretching and walking for exercise.  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  She is still considered an active employee although no longer receiving any benefits.  She is now on so security and Medicare/Senior Care Plus.    Hypothyroidism: Chronic issue.  Taking 75 mcg levothyroxine daily.  Occasional heart palpitations when stressed.  Denies unintentional weight changes.  Denies hyperactivity or increased anxiety.      Exam:    /78   Pulse 66   Temp 36.2 °C (97.1 °F)   Resp 12   Ht 1.524 m (5')   Wt 76.2 kg (168 lb)   SpO2 95%    Gen. appears healthy in no distress   Skin appropriate for sex and age   Neck trachea is midline  Lungs unlabored breathing.  Lungs are clear to auscultation bilaterally.  Heart regular rate and rhythm  Neuro gait and station normal   Psych appropriate, calm, interactive, well-groomed      Assessment / Plan:  \"  1. Dyslipidemia  Comp Metabolic Panel    Lipid Profile    CBC WITH DIFFERENTIAL      2. Essential hypertension  Comp Metabolic Panel    Lipid Profile    CBC WITH DIFFERENTIAL      3. Postoperative hypothyroidism  TSH    FREE THYROXINE      4. Vitamin D deficiency  VITAMIN D,25 HYDROXY (DEFICIENCY)      5. Paroxysmal " atrial fibrillation (HCC)        6. Impaired mobility and ADLs        7. History of stroke          Reviewed recent thyroid lab work which shows a slightly elevated T4.  Because of her history of atrial fibrillation I decreased her levothyroxine dosage to 1/2 pill 1 day/week, continuing 75 mcg 6 days/week.  Recheck labs in March.  She will contact me if she begins to have any symptoms of hyper or hypothyroidism which we discussed in depth including heart palpitations, unintentional weight changes, excessively feeling cold or hot.  Reviewed recent notes from cardiology.  Blood pressure control excellent.  Her note is valid at release through February and she will contact me when she needs this updated.  Otherwise I will see her in March.

## 2023-12-14 ENCOUNTER — ANCILLARY PROCEDURE (OUTPATIENT)
Dept: CARDIOLOGY | Facility: MEDICAL CENTER | Age: 63
End: 2023-12-14
Attending: NURSE PRACTITIONER
Payer: MEDICARE

## 2023-12-14 DIAGNOSIS — I48.0 PAROXYSMAL ATRIAL FIBRILLATION (HCC): ICD-10-CM

## 2023-12-14 LAB
LV EJECT FRACT  99904: 69
LV EJECT FRACT MOD 2C 99903: 69.73
LV EJECT FRACT MOD 4C 99902: 67.76
LV EJECT FRACT MOD BP 99901: 69.02

## 2023-12-14 PROCEDURE — 93306 TTE W/DOPPLER COMPLETE: CPT

## 2023-12-14 PROCEDURE — 93306 TTE W/DOPPLER COMPLETE: CPT | Mod: 26 | Performed by: INTERNAL MEDICINE

## 2023-12-15 ENCOUNTER — TELEPHONE (OUTPATIENT)
Dept: CARDIOLOGY | Facility: MEDICAL CENTER | Age: 63
End: 2023-12-15
Payer: MEDICARE

## 2023-12-15 NOTE — TELEPHONE ENCOUNTER
----- Message from BESSY Alexis sent at 12/15/2023 10:21 AM PST -----  Patient does not have a MyChart.    Echo is totally normal - normal heart size and pumping ability, no valve problems. Super news!  Same meds, and keep FU as scheduled.  Thanks, AB

## 2023-12-15 NOTE — TELEPHONE ENCOUNTER
Tried to call pt at [phone]. No answer, left msg on pt's personalized vm informing of good/normal results and plan as stated by AB. Asked pt to call with any questions or concerns.

## 2024-01-17 ENCOUNTER — TELEPHONE (OUTPATIENT)
Dept: MEDICAL GROUP | Facility: LAB | Age: 64
End: 2024-01-17
Payer: MEDICARE

## 2024-01-19 ENCOUNTER — APPOINTMENT (OUTPATIENT)
Dept: RADIOLOGY | Facility: MEDICAL CENTER | Age: 64
End: 2024-01-19
Attending: NURSE PRACTITIONER
Payer: MEDICARE

## 2024-02-01 ENCOUNTER — OFFICE VISIT (OUTPATIENT)
Dept: MEDICAL GROUP | Facility: LAB | Age: 64
End: 2024-02-01
Payer: MEDICARE

## 2024-02-01 VITALS
DIASTOLIC BLOOD PRESSURE: 76 MMHG | OXYGEN SATURATION: 96 % | WEIGHT: 165 LBS | SYSTOLIC BLOOD PRESSURE: 138 MMHG | RESPIRATION RATE: 12 BRPM | HEART RATE: 62 BPM | TEMPERATURE: 96.2 F | BODY MASS INDEX: 32.39 KG/M2 | HEIGHT: 60 IN

## 2024-02-01 DIAGNOSIS — I48.0 PAROXYSMAL ATRIAL FIBRILLATION (HCC): ICD-10-CM

## 2024-02-01 DIAGNOSIS — I83.811 VARICOSE VEINS OF RIGHT LOWER EXTREMITY WITH PAIN: ICD-10-CM

## 2024-02-01 PROCEDURE — 3078F DIAST BP <80 MM HG: CPT | Performed by: NURSE PRACTITIONER

## 2024-02-01 PROCEDURE — 99213 OFFICE O/P EST LOW 20 MIN: CPT | Performed by: NURSE PRACTITIONER

## 2024-02-01 PROCEDURE — 3075F SYST BP GE 130 - 139MM HG: CPT | Performed by: NURSE PRACTITIONER

## 2024-02-01 ASSESSMENT — PATIENT HEALTH QUESTIONNAIRE - PHQ9: CLINICAL INTERPRETATION OF PHQ2 SCORE: 0

## 2024-02-01 ASSESSMENT — FIBROSIS 4 INDEX: FIB4 SCORE: 1.12

## 2024-02-01 NOTE — PROGRESS NOTES
Chief Complaint   Patient presents with    Varicose Veins       HPI:  Jess is a 63-year-old established female here with complaint of painful varicose veins.  She shows me right anterior knee varicose veins that are enlarging and ache with standing.  Present x 20 yrs but worsening and enlarging.  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  No other symptoms.    She would also like to talk about her weight.  She is wondering if physical therapy can help her with weight.  She does walk a hill near her house a few times a day and uses the elliptical for 15 minutes.  She would like to lose about 30 pounds.    Exam:   /76 (BP Location: Right arm, Patient Position: Sitting, BP Cuff Size: Large adult)   Pulse 62   Temp (!) 35.7 °C (96.2 °F)   Resp 12   Ht 1.524 m (5')   Wt 74.8 kg (165 lb)   SpO2 96%   BMI 32.22 kg/m²   Gen. appears healthy in no distress   Skin appropriate for sex and age   Neck trachea is midline  Lungs unlabored breathing  Heart regular rate and rhythm  Extremities: She has several tortuous varicosities to her right anterior knee.  She has spider veins scattered to her right anterior calf.  She does not have surrounding erythema or any open wounds.  Neuro gait and station normal   Psych appropriate, calm, interactive, well-groomed      Assessment / Plan:  1. Varicose veins of right lower extremity with pain  -painful / aching.  Encouraged consult with Dr. Mckeon.  Encouraged compression hose or socks in the meantime.  Encouraged elevating her feet when seated.  Continue exercise.  - REFERRAL TO CARDIOLOGY    2. BMI 32.0-32.9,adult  -We had a good discussion about avoiding all white carbohydrates and sugars.  Discussed a high-fiber, lean protein, vegetable based diet.  I encouraged her to increase exercise to a continuous time period of 45 minutes every day and to stay active throughout the day at other times around her house/with hobbies, etc.  Discussed that physical therapy does not  work on weight loss, only rehabilitation from injuries, surgeries, strokes, etc.  I encouraged her to follow-up with me in a few months and have a goal of losing about 5 to 10 pounds per month through portion control, healthy eating and exercise.    3.  Paroxysmal atrial fibrillation: Chronic issue.  Followed closely by cardiology.  Compliant with Xarelto and metoprolol.

## 2024-02-13 ENCOUNTER — NURSE TRIAGE (OUTPATIENT)
Dept: HEALTH INFORMATION MANAGEMENT | Facility: OTHER | Age: 64
End: 2024-02-13
Payer: MEDICARE

## 2024-02-13 NOTE — TELEPHONE ENCOUNTER
"Reason for Disposition   Sty on eyelid    Additional Information   Sty suspected (small, painful red lump present on lid margin    Answer Assessment - Initial Assessment Questions  1. ONSET: \"When did the swelling start?\" (e.g., minutes, hours, days)     PAST 2 DAYS  2. LOCATION: \"What part of the eyelids is swollen?\"      BUMP ON LOWER EYELID  3. SEVERITY: \"How swollen is it?\"      MILD  4. ITCHING: \"Is there any itching?\" If so, ask: \"How much?\"   (Scale 1-10; mild, moderate or severe)      NO  5. PAIN: \"Is the swelling painful to touch?\" If so, ask: \"How painful is it?\"   (Scale 1-10; mild, moderate or severe)      SLIGHTLY  TENDER  6. FEVER: \"Do you have a fever?\" If so, ask: \"What is it, how was it measured, and when did it start?\"       NO  7. CAUSE: \"What do you think is causing the swelling?\"      WORE MASCARA FOR THE FIRST TIME IN A LONG TIME  8. RECURRENT SYMPTOM: \"Have you had eyelid swelling before?\" If so, ask: \"When was the last time?\" \"What happened that time?\"      FIRST INCIDENCE  9. OTHER SYMPTOMS: \"Do you have any other symptoms?\" (e.g., blurred vision, eye discharge, rash, runny nose)      NONE  10. PREGNANCY: \"Is there any chance you are pregnant?\" \"When was your last menstrual period?\"        N/A    Protocols used: Eye - Swelling-A-, Sty-A-  Appt made for Friday in case this does not improve with home treatment; she understands she will need to cancel or change the appt by telephone as needed.  Wash hands if touching the area;gentle massage okay if no pain, not on the bump itself.    "

## 2024-02-16 ENCOUNTER — APPOINTMENT (OUTPATIENT)
Dept: MEDICAL GROUP | Facility: LAB | Age: 64
End: 2024-02-16
Payer: MEDICARE

## 2024-02-16 ENCOUNTER — TELEPHONE (OUTPATIENT)
Dept: HEALTH INFORMATION MANAGEMENT | Facility: OTHER | Age: 64
End: 2024-02-16

## 2024-03-05 ENCOUNTER — TELEPHONE (OUTPATIENT)
Dept: MEDICAL GROUP | Facility: LAB | Age: 64
End: 2024-03-05

## 2024-03-05 ENCOUNTER — TELEPHONE (OUTPATIENT)
Dept: HEALTH INFORMATION MANAGEMENT | Facility: OTHER | Age: 64
End: 2024-03-05
Payer: MEDICARE

## 2024-03-05 ENCOUNTER — OFFICE VISIT (OUTPATIENT)
Dept: MEDICAL GROUP | Facility: LAB | Age: 64
End: 2024-03-05
Payer: MEDICARE

## 2024-03-05 VITALS
BODY MASS INDEX: 33.18 KG/M2 | SYSTOLIC BLOOD PRESSURE: 164 MMHG | HEIGHT: 60 IN | OXYGEN SATURATION: 96 % | WEIGHT: 169 LBS | RESPIRATION RATE: 16 BRPM | DIASTOLIC BLOOD PRESSURE: 102 MMHG | HEART RATE: 76 BPM | TEMPERATURE: 97.5 F

## 2024-03-05 DIAGNOSIS — I10 ESSENTIAL HYPERTENSION: ICD-10-CM

## 2024-03-05 DIAGNOSIS — J06.9 UPPER RESPIRATORY TRACT INFECTION, UNSPECIFIED TYPE: ICD-10-CM

## 2024-03-05 DIAGNOSIS — R09.81 NASAL CONGESTION: ICD-10-CM

## 2024-03-05 LAB
FLUAV RNA SPEC QL NAA+PROBE: NEGATIVE
FLUBV RNA SPEC QL NAA+PROBE: NEGATIVE
RSV RNA SPEC QL NAA+PROBE: NEGATIVE
SARS-COV-2 RNA RESP QL NAA+PROBE: NEGATIVE

## 2024-03-05 PROCEDURE — 3080F DIAST BP >= 90 MM HG: CPT | Performed by: FAMILY MEDICINE

## 2024-03-05 PROCEDURE — 3077F SYST BP >= 140 MM HG: CPT | Performed by: FAMILY MEDICINE

## 2024-03-05 PROCEDURE — 99214 OFFICE O/P EST MOD 30 MIN: CPT | Performed by: FAMILY MEDICINE

## 2024-03-05 PROCEDURE — 0241U POCT CEPHEID COV-2, FLU A/B, RSV - PCR: CPT | Performed by: FAMILY MEDICINE

## 2024-03-05 ASSESSMENT — FIBROSIS 4 INDEX: FIB4 SCORE: 1.12

## 2024-03-05 NOTE — PROGRESS NOTES
Subjective:     CC: Nasal discharge    HPI:  Jess is a 63-year-old female patient of [facility] who presents today with for nasal discharge.  Episodes of green nasal discharge with thick chunks began yesterday.  She otherwise feels well.  No fevers, no cough, no shortness of breath, no sore throat.  No fatigue or myalgias.    Medications, past medical history, allergies, and social history have been reviewed and updated.      Objective:       Exam:  BP (!) 164/102   Pulse 76   Temp 36.4 °C (97.5 °F) (Temporal)   Resp 16   Ht 1.524 m (5')   Wt 76.7 kg (169 lb)   SpO2 96%   BMI 33.01 kg/m²  Body mass index is 33.01 kg/m².    Constitutional: Alert. Well appearing. No distress.  Skin: Warm, dry, good turgor, no visible rashes.  ENMT: Moist mucous membranes. Normal dentition.  Tympanic membranes are clear.  Oropharynx is clear.  No sinus tenderness.  Nasal turbinates are erythematous and enlarged with mild clear rhinorrhea.  No significant nasal discharge noted.  Respiratory: Normal effort. Lungs are clear to auscultation bilaterally.  Cardiovascular: Regular rate and rhythm. Normal S1/S2. No murmurs, rubs or gallops.   Neuro: Moves all four extremities. No facial droop.  Psych: Answers questions appropriately. Normal affect and mood.      Assessment & Plan:     63 y.o. female with the following -     1. Nasal congestion  2. Upper respiratory tract infection, unspecified type  Thick, green nasal discharge began yesterday.  She is otherwise without significant associated symptoms.  Exam reveals some erythematous and enlarged nasal turbinates but is otherwise without significant findings.  Suspect congestion/rhinitis related to viral URI or allergic trigger.  At this point I do not see signs of an acute bacterial sinus infection which would require antibiotics but did discuss that this could be considered if symptoms persist for 10+ days.  She would like viral testing done, this is collected and pending.   Discussed that Paxlovid would be an option if her COVID testing was positive but otherwise would continue with conservative management including OTC cough/cold medication (avoiding stimulant decongestions due to her hypertension), nasal saline rinses/humidity.  - POCT CEPHEID COV-2, FLU A/B, RSV - PCR    3. Essential hypertension  BP elevated today but she reports significant anxiety regarding her current symptoms and history of increased BPs at medical visits.  She does have a follow-up with cardiology in 1 week and will hold on changing her medications today.  Continue metoprolol.      Please note that this note was created using voice recognition software.

## 2024-03-05 NOTE — TELEPHONE ENCOUNTER
1. Caller Name: Jess Rojo                          Call Back Number: [phone] (home)         How would the patient prefer to be contacted with a response: Phone call do NOT leave a detailed message    2. What are the patient's symptoms (location & severity)? Cough/ green nasal and mucus     3. Is this a new symptom Yes    4. When did it start? 2 days ago

## 2024-03-05 NOTE — TELEPHONE ENCOUNTER
Triage call from MA, patient states she had a sore throat, now resolved, but has thick green mucus from her nose and was alarmed. She has no fever or other symptoms. Appointment was schedule for exam today. Advised patient to keep appointment to have nasal cavity exam and possible Rx if necessary.

## 2024-03-11 NOTE — TELEPHONE ENCOUNTER
Triage call from MA, patient states she had a sore throat, now resolved, but has thick green mucus from her nose and was alarmed. She has no fever or other symptoms. Appointment was schedule for exam today. Advised patient to keep appointment to have nasal cavity exam and possible Rx if necessary.             TT    3/5/24 11:15 AM  Faby Pickard R.N. routed this conversation to BESSY Rodas M.D.

## 2024-03-12 ENCOUNTER — APPOINTMENT (OUTPATIENT)
Dept: CARDIOLOGY | Facility: MEDICAL CENTER | Age: 64
End: 2024-03-12
Attending: NURSE PRACTITIONER
Payer: MEDICARE

## 2024-03-20 ENCOUNTER — OFFICE VISIT (OUTPATIENT)
Dept: MEDICAL GROUP | Facility: LAB | Age: 64
End: 2024-03-20
Payer: MEDICARE

## 2024-03-20 VITALS
RESPIRATION RATE: 16 BRPM | HEART RATE: 66 BPM | SYSTOLIC BLOOD PRESSURE: 130 MMHG | DIASTOLIC BLOOD PRESSURE: 78 MMHG | TEMPERATURE: 96.7 F | BODY MASS INDEX: 32.79 KG/M2 | WEIGHT: 167 LBS | HEIGHT: 60 IN | OXYGEN SATURATION: 95 %

## 2024-03-20 DIAGNOSIS — H00.012 HORDEOLUM EXTERNUM OF RIGHT LOWER EYELID: ICD-10-CM

## 2024-03-20 DIAGNOSIS — Z79.01 CHRONIC ANTICOAGULATION: ICD-10-CM

## 2024-03-20 DIAGNOSIS — I48.0 PAROXYSMAL ATRIAL FIBRILLATION (HCC): ICD-10-CM

## 2024-03-20 DIAGNOSIS — Z86.73 HISTORY OF STROKE: ICD-10-CM

## 2024-03-20 DIAGNOSIS — I10 ESSENTIAL HYPERTENSION: ICD-10-CM

## 2024-03-20 PROCEDURE — 3078F DIAST BP <80 MM HG: CPT | Performed by: NURSE PRACTITIONER

## 2024-03-20 PROCEDURE — 3075F SYST BP GE 130 - 139MM HG: CPT | Performed by: NURSE PRACTITIONER

## 2024-03-20 PROCEDURE — 99214 OFFICE O/P EST MOD 30 MIN: CPT | Performed by: NURSE PRACTITIONER

## 2024-03-20 RX ORDER — SULFAMETHOXAZOLE AND TRIMETHOPRIM 800; 160 MG/1; MG/1
1 TABLET ORAL 2 TIMES DAILY
Qty: 5 TABLET | Refills: 0 | Status: SHIPPED | OUTPATIENT
Start: 2024-03-20

## 2024-03-20 ASSESSMENT — FIBROSIS 4 INDEX: FIB4 SCORE: 1.12

## 2024-03-20 NOTE — PROGRESS NOTES
Verbal consent was acquired by the patient to use [employer] ambient listening note generation during this visit Yes      Subjective   Jess Rojo is a 63 y.o. female who presents for f/u  History of Present Illness  The patient presents for evaluation of multiple medical concerns.    Stye:  She woke up on Saturday and noticed a bump on her right eyelid. It was feeling really itchy through the night on Monday morning. Today, the bump has enlarged and it is painful.  No home treatments currently.    History of CVA: Has A-fib.  Chronically anticoagulated.  Followed by cardiology as well.  She has residual weakness in her left hand.  Still has difficulty with word recollection and her memory.  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    Hypertension: Chronic issue.  Very well-controlled.  Denies chest pain or headaches.      Objective   /78 (BP Location: Right arm, Patient Position: Sitting, BP Cuff Size: Adult)   Pulse 66   Temp 35.9 °C (96.7 °F)   Resp 16   Ht 1.524 m (5')   Wt 75.8 kg (167 lb)   SpO2 95%   Physical Exam  Gen. appears healthy in no distress   Skin appropriate for sex and age   Eye exam: She has a 4 to 5 mm stye to her right inner eyelid.  There is no erythema to her right lower eyelid.  Neck trachea is midline  Lungs unlabored breathing  Heart regular rate and rhythm  Neuro gait and station normal   Psych appropriate, calm, interactive, well-groomed      Assessment & Plan  1. Stye.  Discussed with Jess that this will most likely resolve on its own. She was advised to use warm compresses for 10 to 15 minutes 3 times a day. She was advised to use Marcelino and Marcelino baby shampoo to wash the eyelid. If the stye gets a lot larger, more painful, and her lower eyelid becomes red, she will start oral antibiotics and she was given a paper prescription today.    2. History of stroke/ paroxysmal atrial fibrillation:  Residual symptoms being difficulty with memory,  word recollection and counting money.  Unable to return to work.  Encouraged her to consider detention.  Encouraged her to continue with efforts at exercise, getting at least 6 to 8 hours of sleep at night, doing home PT/OT, reading, engaging in social interaction.  Has a follow-up scheduled with cardiology.  Compliant with ezetimibe and rosuvastatin.  Blood pressure well-controlled.    3.  Hypertension: Chronic issue and stable.  Continue metoprolol 25 mg daily.    Follow-up  The patient will follow up on 06/10/2024.               Please note that this dictation was created using voice recognition software. I have made every reasonable attempt to correct obvious errors, but I expect that there are errors of grammar and possibly content that I did not discover before finalizing the note.

## 2024-03-20 NOTE — LETTER
March 20, 2024       Patient: Jess Rojo   YOB: 1960   Date of Visit: 3/20/2024         To Whom It May Concern:       Jess has significant residual neurological deficits related to CVA 8/2/2022 and is unable to return for work for another 3 months.  She is currently unable to return to work at this time.       If you have any questions or concerns, please don't hesitate to call [phone]      Sincerely,          ORNAN RodasPSANGEETA.  Electronically Signed

## 2024-03-26 ENCOUNTER — APPOINTMENT (OUTPATIENT)
Dept: RADIOLOGY | Facility: MEDICAL CENTER | Age: 64
End: 2024-03-26
Attending: NURSE PRACTITIONER
Payer: MEDICARE

## 2024-04-16 ENCOUNTER — HOSPITAL ENCOUNTER (OUTPATIENT)
Dept: RADIOLOGY | Facility: MEDICAL CENTER | Age: 64
End: 2024-04-16
Attending: NURSE PRACTITIONER
Payer: MEDICARE

## 2024-04-16 DIAGNOSIS — Z78.0 ASYMPTOMATIC MENOPAUSE: ICD-10-CM

## 2024-04-16 DIAGNOSIS — Z12.31 ENCOUNTER FOR SCREENING MAMMOGRAM FOR BREAST CANCER: ICD-10-CM

## 2024-04-16 PROCEDURE — 77080 DXA BONE DENSITY AXIAL: CPT

## 2024-04-16 PROCEDURE — 77067 SCR MAMMO BI INCL CAD: CPT

## 2024-04-17 ENCOUNTER — TELEPHONE (OUTPATIENT)
Dept: MEDICAL GROUP | Facility: LAB | Age: 64
End: 2024-04-17
Payer: MEDICARE

## 2024-04-17 NOTE — TELEPHONE ENCOUNTER
----- Message from BESSY Rodas sent at 4/17/2024 12:25 PM PDT -----  Please let Jess know that she has osteopenia - this is bone loss related to age and menopause.  Recommendation is 1000 mg calcium and 2000 IU vit D daily with weight bearing exercise.  Recheck 2 years.

## 2024-04-18 DIAGNOSIS — R92.8 ABNORMAL MAMMOGRAM OF LEFT BREAST: ICD-10-CM

## 2024-04-18 DIAGNOSIS — R92.8 ABNORMAL MAMMOGRAM OF RIGHT BREAST: ICD-10-CM

## 2024-04-25 ENCOUNTER — HOSPITAL ENCOUNTER (OUTPATIENT)
Dept: RADIOLOGY | Facility: MEDICAL CENTER | Age: 64
End: 2024-04-25
Attending: NURSE PRACTITIONER
Payer: MEDICARE

## 2024-04-25 DIAGNOSIS — R92.8 ABNORMAL MAMMOGRAM: ICD-10-CM

## 2024-04-25 PROCEDURE — 76642 ULTRASOUND BREAST LIMITED: CPT | Mod: RT

## 2024-05-02 ENCOUNTER — HOSPITAL ENCOUNTER (OUTPATIENT)
Facility: MEDICAL CENTER | Age: 64
End: 2024-05-02
Attending: FAMILY MEDICINE
Payer: MEDICARE

## 2024-05-02 ENCOUNTER — OFFICE VISIT (OUTPATIENT)
Dept: MEDICAL GROUP | Facility: LAB | Age: 64
End: 2024-05-02
Payer: MEDICARE

## 2024-05-02 VITALS
BODY MASS INDEX: 33.18 KG/M2 | RESPIRATION RATE: 16 BRPM | HEART RATE: 70 BPM | TEMPERATURE: 97.8 F | WEIGHT: 169 LBS | DIASTOLIC BLOOD PRESSURE: 84 MMHG | SYSTOLIC BLOOD PRESSURE: 126 MMHG | OXYGEN SATURATION: 98 % | HEIGHT: 60 IN

## 2024-05-02 DIAGNOSIS — N21.8 CALCULUS OF OTHER LOWER URINARY TRACT LOCATION: ICD-10-CM

## 2024-05-02 DIAGNOSIS — R10.9 FLANK PAIN: ICD-10-CM

## 2024-05-02 LAB
APPEARANCE UR: CLEAR
BILIRUB UR STRIP-MCNC: NORMAL MG/DL
COLOR UR AUTO: YELLOW
GLUCOSE UR STRIP.AUTO-MCNC: NORMAL MG/DL
KETONES UR STRIP.AUTO-MCNC: NORMAL MG/DL
LEUKOCYTE ESTERASE UR QL STRIP.AUTO: NORMAL
NITRITE UR QL STRIP.AUTO: NORMAL
PH UR STRIP.AUTO: 6 [PH] (ref 5–8)
PROT UR QL STRIP: NORMAL MG/DL
RBC UR QL AUTO: NORMAL
SP GR UR STRIP.AUTO: 1.01
UROBILINOGEN UR STRIP-MCNC: 0.2 MG/DL

## 2024-05-02 PROCEDURE — 3074F SYST BP LT 130 MM HG: CPT | Performed by: FAMILY MEDICINE

## 2024-05-02 PROCEDURE — 3079F DIAST BP 80-89 MM HG: CPT | Performed by: FAMILY MEDICINE

## 2024-05-02 PROCEDURE — 99214 OFFICE O/P EST MOD 30 MIN: CPT | Performed by: FAMILY MEDICINE

## 2024-05-02 PROCEDURE — 81002 URINALYSIS NONAUTO W/O SCOPE: CPT | Performed by: FAMILY MEDICINE

## 2024-05-02 ASSESSMENT — FIBROSIS 4 INDEX: FIB4 SCORE: 1.12

## 2024-05-03 NOTE — PROGRESS NOTES
Chief Complaint   Patient presents with    Nephrolithiasis    Chills       Verbal consent was acquired by the patient to use [employer] ambient listening note generation during this visit Yes      HPI: Established patient, new to me  History of Present Illness  The patient presents for evaluation of multiple medical concerns.    The patient reports experiencing right-sided lower abdominal pain and back pain, the etiology of which remains uncertain, potentially due to yard work. Concurrently, she has been experiencing pain in the bladder area during urination, with only a trickle of urine. She has a history of kidney stones dating back to 1997, which she passed a few years ago. Last night, she experienced pain during urination, which radiated down her leg. Upon examination, she observed a black seed-like substance in the toilet.     Last year, she sought medical attention at [facility] for tachycardia, during which imaging revealed kidney stones, although they were not of concern at that time. Currently, she is not experiencing any pain or discomfort.   She reports experiencing chills, extreme fatigue, and right-sided flank pain. She has been increasing her water intake and has recently incorporated diluted apple cider vinegar into her diet.    The patient occasionally observes redness and swelling in her throat, which she suspects may be due to allergies. She also reports a sensation of fullness in her ears, particularly in the right ear. Her daughter-in-law, a holistic lifestyle professional, examined her tongue a few weeks ago and noted a split appearance of her tongue, which she considers normal.              Exam:     /84 (BP Location: Left arm, Patient Position: Sitting, BP Cuff Size: Adult)   Pulse 70   Temp 36.6 °C (97.8 °F) (Temporal)   Resp 16   Ht 1.524 m (5')   Wt 76.7 kg (169 lb)   SpO2 98%   BMI 33.01 kg/m²   Gen.: Well-developed, well-nourished, no apparent distress, pleasant and  cooperative with the examination  HEENT: Normocephalic/atraumatic, sinuses nontender with palpation, TMs clear, nares patent with pink mucosa and clear rhinorrhea, oropharynx clear  Neck: No JVD or bruits, no adenopathy  Cor: Regular rate and rhythm without murmur gallop or rub  Lungs: Clear to auscultation with equal breath sounds bilaterally. No wheezes, rhonchi.  Abdomen: Soft nontender without hepatosplenomegaly or masses appreciated, normoactive bowel sounds noted mild discomfort on palpating the right CVA  Extremities: No cyanosis, clubbing or edema       Assessment & Plan      1. Calculus of other lower urinary tract location  History of recurrent renal stones, this is new concern at this time.  The patient's urine analysis revealed hematuria, a condition that can be attributed to the passage of a renal calculus. A urine culture will be conducted to exclude the need for antibiotic therapy. Additionally, a renal function test will be ordered due to the patient's reported chills. A computed tomography (CT) scan of the kidney will be ordered. A referral to a urologist will be made, with a preference for a female provider. The patient is advised to maintain adequate hydration. In the event of symptom exacerbation, the patient is advised to seek immediate medical attention at the Emergency Room.  - URINE CULTURE(NEW); Future  - POCT Urinalysis  - CT-RENAL COLIC EVALUATION(A/P W/O); Future  - Referral to Urology    2. Flank pain  We will do a CT renal and labs for further evaluation, follow-up with PCP  - CBC WITH DIFFERENTIAL; Future  - Comp Metabolic Panel; Future  - CT-RENAL COLIC EVALUATION(A/P W/O); Future  - Referral to Urology        Pharyngeal discomfort.  Should the patient experience symptoms of pharyngitis or upper respiratory tract symptoms, a swab test will be performed.      Please note that this dictation was created using voice recognition software. I have made every reasonable attempt to correct  obvious errors but there may be errors of grammar and content that I may have overlooked prior to finalization of this note.

## 2024-05-05 LAB
BACTERIA UR CULT: NORMAL
SIGNIFICANT IND 70042: NORMAL
SITE SITE: NORMAL
SOURCE SOURCE: NORMAL

## 2024-05-20 RX ORDER — SULFAMETHOXAZOLE AND TRIMETHOPRIM 800; 160 MG/1; MG/1
1 TABLET ORAL 2 TIMES DAILY
Qty: 5 TABLET | Refills: 0 | OUTPATIENT
Start: 2024-05-20

## 2024-05-20 NOTE — TELEPHONE ENCOUNTER
Patient called and LVM  stye got better but did not go away and wants to know if she can get another antibiotic sent to [facility]

## 2024-05-20 NOTE — TELEPHONE ENCOUNTER
Stye will not go away with oral antibiotics - needs warm compresses and time.  Could take months.

## 2024-05-23 ENCOUNTER — HOSPITAL ENCOUNTER (OUTPATIENT)
Dept: RADIOLOGY | Facility: MEDICAL CENTER | Age: 64
End: 2024-05-23
Attending: FAMILY MEDICINE
Payer: MEDICARE

## 2024-05-23 DIAGNOSIS — R10.9 FLANK PAIN: ICD-10-CM

## 2024-05-23 DIAGNOSIS — N21.8 CALCULUS OF OTHER LOWER URINARY TRACT LOCATION: ICD-10-CM

## 2024-06-05 ENCOUNTER — HOSPITAL ENCOUNTER (OUTPATIENT)
Dept: LAB | Facility: MEDICAL CENTER | Age: 64
End: 2024-06-05
Attending: NURSE PRACTITIONER
Payer: MEDICARE

## 2024-06-05 DIAGNOSIS — I10 ESSENTIAL HYPERTENSION: ICD-10-CM

## 2024-06-05 DIAGNOSIS — E55.9 VITAMIN D DEFICIENCY: ICD-10-CM

## 2024-06-05 DIAGNOSIS — E89.0 POSTOPERATIVE HYPOTHYROIDISM: ICD-10-CM

## 2024-06-05 DIAGNOSIS — E78.5 DYSLIPIDEMIA: ICD-10-CM

## 2024-06-05 LAB
25(OH)D3 SERPL-MCNC: 66 NG/ML (ref 30–100)
ALBUMIN SERPL BCP-MCNC: 4.2 G/DL (ref 3.2–4.9)
ALBUMIN/GLOB SERPL: 1.6 G/DL
ALP SERPL-CCNC: 99 U/L (ref 30–99)
ALT SERPL-CCNC: 49 U/L (ref 2–50)
ANION GAP SERPL CALC-SCNC: 10 MMOL/L (ref 7–16)
AST SERPL-CCNC: 47 U/L (ref 12–45)
BASOPHILS # BLD AUTO: 0.9 % (ref 0–1.8)
BASOPHILS # BLD: 0.05 K/UL (ref 0–0.12)
BILIRUB SERPL-MCNC: 0.7 MG/DL (ref 0.1–1.5)
BUN SERPL-MCNC: 17 MG/DL (ref 8–22)
CALCIUM ALBUM COR SERPL-MCNC: 8.9 MG/DL (ref 8.5–10.5)
CALCIUM SERPL-MCNC: 9.1 MG/DL (ref 8.5–10.5)
CHLORIDE SERPL-SCNC: 103 MMOL/L (ref 96–112)
CHOLEST SERPL-MCNC: 260 MG/DL (ref 100–199)
CO2 SERPL-SCNC: 25 MMOL/L (ref 20–33)
CREAT SERPL-MCNC: 0.68 MG/DL (ref 0.5–1.4)
EOSINOPHIL # BLD AUTO: 0.11 K/UL (ref 0–0.51)
EOSINOPHIL NFR BLD: 2 % (ref 0–6.9)
ERYTHROCYTE [DISTWIDTH] IN BLOOD BY AUTOMATED COUNT: 44 FL (ref 35.9–50)
FASTING STATUS PATIENT QL REPORTED: NORMAL
GFR SERPLBLD CREATININE-BSD FMLA CKD-EPI: 96 ML/MIN/1.73 M 2
GLOBULIN SER CALC-MCNC: 2.6 G/DL (ref 1.9–3.5)
GLUCOSE SERPL-MCNC: 90 MG/DL (ref 65–99)
HCT VFR BLD AUTO: 47.5 % (ref 37–47)
HDLC SERPL-MCNC: 67 MG/DL
HGB BLD-MCNC: 16.1 G/DL (ref 12–16)
IMM GRANULOCYTES # BLD AUTO: 0.02 K/UL (ref 0–0.11)
IMM GRANULOCYTES NFR BLD AUTO: 0.4 % (ref 0–0.9)
LDLC SERPL CALC-MCNC: 171 MG/DL
LYMPHOCYTES # BLD AUTO: 1.8 K/UL (ref 1–4.8)
LYMPHOCYTES NFR BLD: 32.5 % (ref 22–41)
MCH RBC QN AUTO: 31.6 PG (ref 27–33)
MCHC RBC AUTO-ENTMCNC: 33.9 G/DL (ref 32.2–35.5)
MCV RBC AUTO: 93.1 FL (ref 81.4–97.8)
MONOCYTES # BLD AUTO: 0.63 K/UL (ref 0–0.85)
MONOCYTES NFR BLD AUTO: 11.4 % (ref 0–13.4)
NEUTROPHILS # BLD AUTO: 2.93 K/UL (ref 1.82–7.42)
NEUTROPHILS NFR BLD: 52.8 % (ref 44–72)
NRBC # BLD AUTO: 0 K/UL
NRBC BLD-RTO: 0 /100 WBC (ref 0–0.2)
PLATELET # BLD AUTO: 301 K/UL (ref 164–446)
PMV BLD AUTO: 9.5 FL (ref 9–12.9)
POTASSIUM SERPL-SCNC: 4.6 MMOL/L (ref 3.6–5.5)
PROT SERPL-MCNC: 6.8 G/DL (ref 6–8.2)
RBC # BLD AUTO: 5.1 M/UL (ref 4.2–5.4)
SODIUM SERPL-SCNC: 138 MMOL/L (ref 135–145)
T4 FREE SERPL-MCNC: 1.49 NG/DL (ref 0.93–1.7)
TRIGL SERPL-MCNC: 111 MG/DL (ref 0–149)
TSH SERPL DL<=0.005 MIU/L-ACNC: 4.28 UIU/ML (ref 0.38–5.33)
WBC # BLD AUTO: 5.5 K/UL (ref 4.8–10.8)

## 2024-06-05 PROCEDURE — 36415 COLL VENOUS BLD VENIPUNCTURE: CPT

## 2024-06-05 PROCEDURE — 80053 COMPREHEN METABOLIC PANEL: CPT

## 2024-06-05 PROCEDURE — 80061 LIPID PANEL: CPT

## 2024-06-05 PROCEDURE — 82306 VITAMIN D 25 HYDROXY: CPT

## 2024-06-05 PROCEDURE — 84439 ASSAY OF FREE THYROXINE: CPT

## 2024-06-05 PROCEDURE — 85025 COMPLETE CBC W/AUTO DIFF WBC: CPT

## 2024-06-05 PROCEDURE — 84443 ASSAY THYROID STIM HORMONE: CPT

## 2024-06-10 ENCOUNTER — APPOINTMENT (OUTPATIENT)
Dept: CARDIOLOGY | Facility: MEDICAL CENTER | Age: 64
End: 2024-06-10
Attending: NURSE PRACTITIONER
Payer: MEDICARE

## 2024-06-11 ENCOUNTER — TELEPHONE (OUTPATIENT)
Dept: CARDIOLOGY | Facility: MEDICAL CENTER | Age: 64
End: 2024-06-11
Payer: MEDICARE

## 2024-06-12 ENCOUNTER — APPOINTMENT (OUTPATIENT)
Dept: MEDICAL GROUP | Facility: LAB | Age: 64
End: 2024-06-12
Payer: MEDICARE

## 2024-06-19 DIAGNOSIS — I48.0 PAROXYSMAL ATRIAL FIBRILLATION (HCC): ICD-10-CM

## 2024-06-19 DIAGNOSIS — I48.91 ATRIAL FIBRILLATION WITH RVR (HCC): ICD-10-CM

## 2024-06-19 NOTE — TELEPHONE ENCOUNTER
Hi @fn@Received request via: Pharmacy    Was the patient seen in the last year in this department? Yes    Does the patient have an active prescription (recently filled or refills available) for medication(s) requested? No    Pharmacy Name: [facility]    Does the patient have [facility] and need 100 day supply (blood pressure, diabetes and cholesterol meds only)? Yes, quantity updated to 100 days

## 2024-06-20 RX ORDER — METOPROLOL SUCCINATE 25 MG/1
25 TABLET, EXTENDED RELEASE ORAL DAILY
Qty: 90 TABLET | Refills: 0 | Status: SHIPPED | OUTPATIENT
Start: 2024-06-20

## 2024-06-26 ENCOUNTER — OFFICE VISIT (OUTPATIENT)
Dept: MEDICAL GROUP | Facility: LAB | Age: 64
End: 2024-06-26
Payer: MEDICARE

## 2024-06-26 VITALS
RESPIRATION RATE: 16 BRPM | SYSTOLIC BLOOD PRESSURE: 122 MMHG | OXYGEN SATURATION: 94 % | DIASTOLIC BLOOD PRESSURE: 70 MMHG | BODY MASS INDEX: 32.98 KG/M2 | WEIGHT: 168 LBS | HEIGHT: 60 IN | HEART RATE: 68 BPM | TEMPERATURE: 97.1 F

## 2024-06-26 DIAGNOSIS — M85.89 OSTEOPENIA OF MULTIPLE SITES: ICD-10-CM

## 2024-06-26 DIAGNOSIS — R92.8 ABNORMAL MAMMOGRAM OF RIGHT BREAST: ICD-10-CM

## 2024-06-26 DIAGNOSIS — N20.0 RENAL CALCULUS, BILATERAL: ICD-10-CM

## 2024-06-26 DIAGNOSIS — E89.0 POSTOPERATIVE HYPOTHYROIDISM: ICD-10-CM

## 2024-06-26 DIAGNOSIS — E78.5 DYSLIPIDEMIA: ICD-10-CM

## 2024-06-26 PROCEDURE — 3078F DIAST BP <80 MM HG: CPT | Performed by: NURSE PRACTITIONER

## 2024-06-26 PROCEDURE — 99214 OFFICE O/P EST MOD 30 MIN: CPT | Performed by: NURSE PRACTITIONER

## 2024-06-26 PROCEDURE — 3074F SYST BP LT 130 MM HG: CPT | Performed by: NURSE PRACTITIONER

## 2024-06-26 ASSESSMENT — FIBROSIS 4 INDEX: FIB4 SCORE: 1.45

## 2024-06-26 NOTE — LETTER
June 26, 2024       Patient: Jess Rojo   YOB: 1960   Date of Visit: 6/26/2024         To Whom It May Concern:        Jess has significant residual neurological deficits related to CVA 8/2/2022 and is unable to return for work for another 3 months.  She is currently unable to return to work at this time.       If you have any questions or concerns, please don't hesitate to call [phone]        Sincerely,              Ruchi Rosenbaum, A.P.N.  Electronically Signed    If you have any questions or concerns, please don't hesitate to call [phone]          Sincerely,          Ruchi Rosenbaum, A.P.N.  Electronically Signed

## 2024-06-26 NOTE — PROGRESS NOTES
Verbal consent was acquired by the patient to use [employer] ambient listening note generation during this visit Yes      Subjective   Jess Rojo is a 63 y.o. female who presents for follow-up  History of Present Illness  The patient is a 63-year-old established female here for follow-up.  She had a CVA August 2, 2022.  We see her every 3 months approximately and need to renew a letter stating that she cannot return to work.  Her blood pressure has been doing well.  She did repeat her lipid panel this month which showed that her LDL climbed to 100 points.  She does not think that she has been without her rosuvastatin or ezetimibe but is not 100% certain.  She has made dietary modifications, eliminating fast food from her diet, and incorporating walking into her routine at [facility].  However, her walking distance is limited to 5000 steps due to body heaviness.     Osteopenia: New diagnoses although she did state that she had a bone density done about 10 years ago through [facility].  No recent bony fractures.  She does not take calcium.      She underwent a mammogram, which revealed dense tissue and an abnormality on the right breast around 9 o'clock. An ultrasound of the right breast revealed an elongated lymph node, which was slightly larger than average or normal she was told that she can return to her normal annual mammograms.      Kidney stones: A CT scan of her kidneys revealed a few small kidney stones. She does not take calcium supplements.  She denies hematuria, dysuria, urinary hesitancy or increased frequency.  Denies flank pain.    Hx of CVA 8/2/2022- persistent memory issues/loss, left sided weakness/numbness with tingling in the face / hand.        Review of Systems  Negative except for HPI  Objective   /70 (BP Location: Right arm, Patient Position: Sitting, BP Cuff Size: Large adult)   Pulse 68   Temp 36.2 °C (97.1 °F)   Resp 16   Ht 1.524 m (5')   Wt 76.2 kg (168 lb)    SpO2 94%   Physical Exam  Gen. appears healthy in no distress   Skin appropriate for sex and age   Neck trachea is midline  Lungs unlabored breathing  Heart regular rate and rhythm  Neuro gait and station normal   Psych appropriate, calm, interactive, well-groomed    Results  Laboratory Studies  LDL cholesterol was 171.    Imaging  Bone density test showed early osteopenia. CAT scan of kidneys showed a couple of small kidney stones.       Assessment & Plan  1. Dyslipidemia  -Suspect that she ran out of or did not put to rosuvastatin/ezetimibe in her pillboxes for the past several weeks which we discussed.  She will go home and check her medications.  She will repeat a lipid panel in 1 month when she is certain that she has been back on rosuvastatin and ezetimibe.  He does follow-up with cardiology in September.  I will follow-up with her via telephone in 1 month if she does not use her MyChart.  - Lipid Profile; Future    2. Postoperative hypothyroidism  -Labs are excellent.  Continue 75 mcg levothyroxine.    3. Osteopenia of multiple sites  -Discussed osteopenia versus osteoporosis.  Discussed calcium and vitamin D.  She will refrain from calcium because of her kidney stones.  She will continue with her excellent efforts at walking 5-10,000 steps per day.  May take vitamin D.  Discussed light weightlifting.  Recheck 2 years.    4. Abnormal mammogram of right breast  -Negative ultrasound.  Return to yearly mammograms as a screening.    5. Renal calculus, bilateral  -Asymptomatic.  Discussed benign nature.  Avoid calcium supplementation.  She is staying very well-hydrated.    6.  History of CVA:  Still has significant memory issues.  Unable to return to work at this time.               Please note that this dictation was created using voice recognition software. I have made every reasonable attempt to correct obvious errors, but I expect that there are errors of grammar and possibly content that I did not discover  before finalizing the note.

## 2024-06-27 ENCOUNTER — TELEPHONE (OUTPATIENT)
Dept: MEDICAL GROUP | Facility: LAB | Age: 64
End: 2024-06-27
Payer: MEDICARE

## 2024-08-22 ENCOUNTER — DOCUMENTATION (OUTPATIENT)
Dept: HEALTH INFORMATION MANAGEMENT | Facility: OTHER | Age: 64
End: 2024-08-22
Payer: MEDICARE

## 2024-08-23 DIAGNOSIS — E78.5 DYSLIPIDEMIA: ICD-10-CM

## 2024-08-23 DIAGNOSIS — I63.10 CEREBROVASCULAR ACCIDENT (CVA) DUE TO EMBOLISM OF PRECEREBRAL ARTERY (HCC): ICD-10-CM

## 2024-08-23 NOTE — TELEPHONE ENCOUNTER
Received request via: Pharmacy    Was the patient seen in the last year in this department? Yes  6/26/24  Does the patient have an active prescription (recently filled or refills available) for medication(s) requested? No    [facility]    Does the patient have [facility] and need 100-day supply? (This applies to ALL medications)

## 2024-08-25 RX ORDER — ROSUVASTATIN CALCIUM 40 MG/1
40 TABLET, COATED ORAL EVERY EVENING
Qty: 100 TABLET | Refills: 0 | Status: SHIPPED | OUTPATIENT
Start: 2024-08-25

## 2024-08-27 ENCOUNTER — OFFICE VISIT (OUTPATIENT)
Dept: MEDICAL GROUP | Facility: LAB | Age: 64
End: 2024-08-27
Payer: MEDICARE

## 2024-08-27 VITALS
WEIGHT: 171 LBS | DIASTOLIC BLOOD PRESSURE: 80 MMHG | BODY MASS INDEX: 33.57 KG/M2 | TEMPERATURE: 98.1 F | SYSTOLIC BLOOD PRESSURE: 136 MMHG | HEIGHT: 60 IN | OXYGEN SATURATION: 95 % | HEART RATE: 64 BPM | RESPIRATION RATE: 16 BRPM

## 2024-08-27 DIAGNOSIS — J45.21 MILD INTERMITTENT REACTIVE AIRWAY DISEASE WITH ACUTE EXACERBATION: ICD-10-CM

## 2024-08-27 PROCEDURE — 3075F SYST BP GE 130 - 139MM HG: CPT | Performed by: NURSE PRACTITIONER

## 2024-08-27 PROCEDURE — 3079F DIAST BP 80-89 MM HG: CPT | Performed by: NURSE PRACTITIONER

## 2024-08-27 PROCEDURE — 99213 OFFICE O/P EST LOW 20 MIN: CPT | Performed by: NURSE PRACTITIONER

## 2024-08-27 RX ORDER — ALBUTEROL SULFATE 90 UG/1
2 AEROSOL, METERED RESPIRATORY (INHALATION) EVERY 4 HOURS PRN
Qty: 1 EACH | Refills: 2 | Status: SHIPPED | OUTPATIENT
Start: 2024-08-27 | End: 2024-11-25

## 2024-08-27 ASSESSMENT — FIBROSIS 4 INDEX: FIB4 SCORE: 1.47
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
## 2024-08-28 ENCOUNTER — TELEPHONE (OUTPATIENT)
Dept: MEDICAL GROUP | Facility: LAB | Age: 64
End: 2024-08-28
Payer: MEDICARE

## 2024-08-28 RX ORDER — INHALER, ASSIST DEVICES
1 SPACER (EA) MISCELLANEOUS ONCE
Qty: 1 EACH | Refills: 2 | Status: SHIPPED | OUTPATIENT
Start: 2024-08-28 | End: 2024-08-28

## 2024-08-29 DIAGNOSIS — K21.00 GASTROESOPHAGEAL REFLUX DISEASE WITH ESOPHAGITIS WITHOUT HEMORRHAGE: ICD-10-CM

## 2024-09-04 DIAGNOSIS — I48.0 PAROXYSMAL ATRIAL FIBRILLATION (HCC): ICD-10-CM

## 2024-09-04 DIAGNOSIS — I48.91 ATRIAL FIBRILLATION WITH RVR (HCC): ICD-10-CM

## 2024-09-04 RX ORDER — METOPROLOL SUCCINATE 25 MG/1
25 TABLET, EXTENDED RELEASE ORAL DAILY
Qty: 90 TABLET | Refills: 0 | Status: SHIPPED | OUTPATIENT
Start: 2024-09-04

## 2024-09-04 NOTE — TELEPHONE ENCOUNTER
Received request via: Pharmacy    Was the patient seen in the last year in this department? Yes    Does the patient have an active prescription (recently filled or refills available) for medication(s) requested? No    [facility] Name[facility]    Does the patient have [facility] and need 100-day supply? (This applies to ALL medications) Yes, quantity updated to 100 days

## 2024-09-16 ENCOUNTER — HOSPITAL ENCOUNTER (OUTPATIENT)
Dept: LAB | Facility: MEDICAL CENTER | Age: 64
End: 2024-09-16
Attending: FAMILY MEDICINE
Payer: MEDICARE

## 2024-09-16 ENCOUNTER — HOSPITAL ENCOUNTER (OUTPATIENT)
Dept: LAB | Facility: MEDICAL CENTER | Age: 64
End: 2024-09-16
Attending: NURSE PRACTITIONER
Payer: MEDICARE

## 2024-09-16 ENCOUNTER — OFFICE VISIT (OUTPATIENT)
Dept: MEDICAL GROUP | Facility: LAB | Age: 64
End: 2024-09-16
Payer: MEDICARE

## 2024-09-16 VITALS
TEMPERATURE: 97.6 F | WEIGHT: 172 LBS | DIASTOLIC BLOOD PRESSURE: 82 MMHG | OXYGEN SATURATION: 94 % | HEIGHT: 60 IN | HEART RATE: 66 BPM | BODY MASS INDEX: 33.77 KG/M2 | RESPIRATION RATE: 16 BRPM | SYSTOLIC BLOOD PRESSURE: 138 MMHG

## 2024-09-16 DIAGNOSIS — R10.9 FLANK PAIN: ICD-10-CM

## 2024-09-16 DIAGNOSIS — J06.9 ACUTE URI: ICD-10-CM

## 2024-09-16 DIAGNOSIS — E78.5 DYSLIPIDEMIA: ICD-10-CM

## 2024-09-16 LAB
ALBUMIN SERPL BCP-MCNC: 4.1 G/DL (ref 3.2–4.9)
ALBUMIN/GLOB SERPL: 1.5 G/DL
ALP SERPL-CCNC: 80 U/L (ref 30–99)
ALT SERPL-CCNC: 21 U/L (ref 2–50)
ANION GAP SERPL CALC-SCNC: 13 MMOL/L (ref 7–16)
AST SERPL-CCNC: 27 U/L (ref 12–45)
BASOPHILS # BLD AUTO: 0.7 % (ref 0–1.8)
BASOPHILS # BLD: 0.05 K/UL (ref 0–0.12)
BILIRUB SERPL-MCNC: 0.6 MG/DL (ref 0.1–1.5)
BUN SERPL-MCNC: 12 MG/DL (ref 8–22)
CALCIUM ALBUM COR SERPL-MCNC: 8.5 MG/DL (ref 8.5–10.5)
CALCIUM SERPL-MCNC: 8.6 MG/DL (ref 8.5–10.5)
CHLORIDE SERPL-SCNC: 102 MMOL/L (ref 96–112)
CO2 SERPL-SCNC: 23 MMOL/L (ref 20–33)
CREAT SERPL-MCNC: 0.74 MG/DL (ref 0.5–1.4)
EOSINOPHIL # BLD AUTO: 0.1 K/UL (ref 0–0.51)
EOSINOPHIL NFR BLD: 1.4 % (ref 0–6.9)
ERYTHROCYTE [DISTWIDTH] IN BLOOD BY AUTOMATED COUNT: 43.5 FL (ref 35.9–50)
FASTING STATUS PATIENT QL REPORTED: NORMAL
FLUAV RNA SPEC QL NAA+PROBE: NEGATIVE
FLUBV RNA SPEC QL NAA+PROBE: NEGATIVE
GFR SERPLBLD CREATININE-BSD FMLA CKD-EPI: 89 ML/MIN/1.73 M 2
GLOBULIN SER CALC-MCNC: 2.7 G/DL (ref 1.9–3.5)
GLUCOSE SERPL-MCNC: 108 MG/DL (ref 65–99)
HCT VFR BLD AUTO: 46.6 % (ref 37–47)
HGB BLD-MCNC: 15.6 G/DL (ref 12–16)
IMM GRANULOCYTES # BLD AUTO: 0.01 K/UL (ref 0–0.11)
IMM GRANULOCYTES NFR BLD AUTO: 0.1 % (ref 0–0.9)
LYMPHOCYTES # BLD AUTO: 1.99 K/UL (ref 1–4.8)
LYMPHOCYTES NFR BLD: 27.3 % (ref 22–41)
MCH RBC QN AUTO: 31.4 PG (ref 27–33)
MCHC RBC AUTO-ENTMCNC: 33.5 G/DL (ref 32.2–35.5)
MCV RBC AUTO: 93.8 FL (ref 81.4–97.8)
MONOCYTES # BLD AUTO: 0.74 K/UL (ref 0–0.85)
MONOCYTES NFR BLD AUTO: 10.1 % (ref 0–13.4)
NEUTROPHILS # BLD AUTO: 4.41 K/UL (ref 1.82–7.42)
NEUTROPHILS NFR BLD: 60.4 % (ref 44–72)
NRBC # BLD AUTO: 0 K/UL
NRBC BLD-RTO: 0 /100 WBC (ref 0–0.2)
PLATELET # BLD AUTO: 290 K/UL (ref 164–446)
PMV BLD AUTO: 9.9 FL (ref 9–12.9)
POTASSIUM SERPL-SCNC: 4.2 MMOL/L (ref 3.6–5.5)
PROT SERPL-MCNC: 6.8 G/DL (ref 6–8.2)
RBC # BLD AUTO: 4.97 M/UL (ref 4.2–5.4)
RSV RNA SPEC QL NAA+PROBE: NEGATIVE
SARS-COV-2 RNA RESP QL NAA+PROBE: NEGATIVE
SODIUM SERPL-SCNC: 138 MMOL/L (ref 135–145)
WBC # BLD AUTO: 7.3 K/UL (ref 4.8–10.8)

## 2024-09-16 PROCEDURE — 0241U POCT CEPHEID COV-2, FLU A/B, RSV - PCR: CPT | Performed by: NURSE PRACTITIONER

## 2024-09-16 PROCEDURE — 36415 COLL VENOUS BLD VENIPUNCTURE: CPT

## 2024-09-16 PROCEDURE — 99213 OFFICE O/P EST LOW 20 MIN: CPT | Performed by: NURSE PRACTITIONER

## 2024-09-16 PROCEDURE — 80061 LIPID PANEL: CPT

## 2024-09-16 PROCEDURE — 3079F DIAST BP 80-89 MM HG: CPT | Performed by: NURSE PRACTITIONER

## 2024-09-16 PROCEDURE — 85025 COMPLETE CBC W/AUTO DIFF WBC: CPT

## 2024-09-16 PROCEDURE — 80053 COMPREHEN METABOLIC PANEL: CPT

## 2024-09-16 PROCEDURE — 3075F SYST BP GE 130 - 139MM HG: CPT | Performed by: NURSE PRACTITIONER

## 2024-09-16 ASSESSMENT — FIBROSIS 4 INDEX: FIB4 SCORE: 1.47

## 2024-09-16 NOTE — PROGRESS NOTES
Verbal consent was acquired by the patient to use [email or web address] ambient listening note generation during this visit Yes      Subjective   Jess Rojo is a 64 y.o. female who presents for uri symptoms.  History of Present Illness  The patient is a 64-year-old established female presenting with a complaint of a cough for the past 3 days.    She reports feeling unwell, with symptoms that began last Friday. These symptoms include a severe cough, sore throat, and pain in her ribs and upper back due to the intensity of her coughing. The cough is productive, initially producing yellow mucus which has since become foamy. She experiences shortness of breath, particularly when lying down, and occasional ear pain.     She also mentions tenderness in her lymph nodes, uriah left axillary area. Her appetite is reduced, and she feels fatigued. She reports no facial pain, vomiting, nausea, or diarrhea. She believes her condition is improving today compared to yesterday.    Additionally, she notes a musty smell in the office, which she associates with a previous stroke. She has been taking Coricidin, which has provided some relief from her cough.    nonsmoker    Review of Systems  Neg except hpi  Objective   /82 (BP Location: Left arm, Patient Position: Sitting, BP Cuff Size: Large adult)   Pulse 66   Temp 36.4 °C (97.6 °F)   Resp 16   Ht 1.524 m (5')   Wt 78 kg (172 lb)   SpO2 94%   Physical Exam  Constitutional: Alert, no distress, plus 3 vital signs  Skin:  Warm, dry, no rashes invisible areas  Eye: Equal, round and reactive, conjunctiva clear  ENMT: Bilateral tympanic membranes are retracted but translucent without erythema or perforation. no sinus tenderness. Oropharynx is slightly injected without exudate. No tonsillar enlargement   Neck: Mild anterior cervical, nontender lymphadenopathy  Respiratory: Unlabored respiration, lungs clear to auscultation, no wheezes, no rhonchi  Cardiovascular: Normal rate and  rhythm, no murmur, no edema  Psych: Alert, pleasant, well-groomed, normal affect        Assessment & Plan  #1-acute URI: Fortunately improving today compared to yesterday.  May continue OTC Coricidin, high water intake, vitamin C, deep breathing exercises.  Encouraged her to use the albuterol inhaler that she has at home, 1 to 2 puffs every 4-6 hours as needed for shortness of breath.  Negative COVID/influenza/RSV.  Likely common cold.  Symptoms improving.  May continue symptomatic care at home and notify me by the end of the week if symptoms regress/fail to resolve.                   Please note that this dictation was created using voice recognition software. I have made every reasonable attempt to correct obvious errors, but I expect that there are errors of grammar and possibly content that I did not discover before finalizing the note.

## 2024-09-17 LAB
CHOLEST SERPL-MCNC: 128 MG/DL (ref 100–199)
FASTING STATUS PATIENT QL REPORTED: NORMAL
HDLC SERPL-MCNC: 66 MG/DL
LDLC SERPL CALC-MCNC: 44 MG/DL
TRIGL SERPL-MCNC: 90 MG/DL (ref 0–149)

## 2024-09-18 ENCOUNTER — TELEPHONE (OUTPATIENT)
Dept: MEDICAL GROUP | Facility: LAB | Age: 64
End: 2024-09-18
Payer: MEDICARE

## 2024-09-18 NOTE — TELEPHONE ENCOUNTER
Phone Number Called: [phone] (home)      Call outcome: Spoke to patient regarding message below.    Message: Spoke with patient regarding lab results. Patient didn't have ant other questions.

## 2024-09-23 ENCOUNTER — OFFICE VISIT (OUTPATIENT)
Dept: CARDIOLOGY | Facility: MEDICAL CENTER | Age: 64
End: 2024-09-23
Attending: NURSE PRACTITIONER
Payer: MEDICARE

## 2024-09-23 VITALS
DIASTOLIC BLOOD PRESSURE: 74 MMHG | WEIGHT: 173 LBS | OXYGEN SATURATION: 92 % | RESPIRATION RATE: 14 BRPM | HEART RATE: 86 BPM | SYSTOLIC BLOOD PRESSURE: 122 MMHG | BODY MASS INDEX: 33.96 KG/M2 | HEIGHT: 60 IN

## 2024-09-23 DIAGNOSIS — I63.9 ISCHEMIC STROKE (HCC): ICD-10-CM

## 2024-09-23 DIAGNOSIS — I63.10 CEREBROVASCULAR ACCIDENT (CVA) DUE TO EMBOLISM OF PRECEREBRAL ARTERY (HCC): ICD-10-CM

## 2024-09-23 DIAGNOSIS — Z86.73 HISTORY OF STROKE: ICD-10-CM

## 2024-09-23 DIAGNOSIS — R60.0 LOCALIZED EDEMA: ICD-10-CM

## 2024-09-23 DIAGNOSIS — I10 ESSENTIAL HYPERTENSION: ICD-10-CM

## 2024-09-23 DIAGNOSIS — I48.0 PAROXYSMAL ATRIAL FIBRILLATION (HCC): ICD-10-CM

## 2024-09-23 DIAGNOSIS — I48.91 ATRIAL FIBRILLATION WITH RVR (HCC): ICD-10-CM

## 2024-09-23 DIAGNOSIS — Z79.01 CHRONIC ANTICOAGULATION: ICD-10-CM

## 2024-09-23 DIAGNOSIS — E89.0 POSTOPERATIVE HYPOTHYROIDISM: ICD-10-CM

## 2024-09-23 DIAGNOSIS — E78.5 DYSLIPIDEMIA: ICD-10-CM

## 2024-09-23 PROCEDURE — 3074F SYST BP LT 130 MM HG: CPT | Performed by: NURSE PRACTITIONER

## 2024-09-23 PROCEDURE — 3078F DIAST BP <80 MM HG: CPT | Performed by: NURSE PRACTITIONER

## 2024-09-23 PROCEDURE — 99213 OFFICE O/P EST LOW 20 MIN: CPT | Performed by: NURSE PRACTITIONER

## 2024-09-23 PROCEDURE — 99214 OFFICE O/P EST MOD 30 MIN: CPT | Performed by: NURSE PRACTITIONER

## 2024-09-23 RX ORDER — POTASSIUM CHLORIDE 750 MG/1
TABLET, EXTENDED RELEASE ORAL
Qty: 100 TABLET | Refills: 1 | Status: SHIPPED | OUTPATIENT
Start: 2024-09-23

## 2024-09-23 RX ORDER — ROSUVASTATIN CALCIUM 40 MG/1
40 TABLET, COATED ORAL EVERY EVENING
Qty: 100 TABLET | Refills: 3 | Status: SHIPPED | OUTPATIENT
Start: 2024-09-23

## 2024-09-23 RX ORDER — EZETIMIBE 10 MG/1
10 TABLET ORAL DAILY
Qty: 100 TABLET | Refills: 3 | Status: SHIPPED | OUTPATIENT
Start: 2024-09-23

## 2024-09-23 RX ORDER — FUROSEMIDE 20 MG
20 TABLET ORAL
Qty: 100 TABLET | Refills: 1 | Status: SHIPPED | OUTPATIENT
Start: 2024-09-23

## 2024-09-23 ASSESSMENT — ENCOUNTER SYMPTOMS
CHILLS: 0
PND: 0
BRUISES/BLEEDS EASILY: 0
COUGH: 1
INSOMNIA: 0
PALPITATIONS: 0
FEVER: 0
DIZZINESS: 0
ORTHOPNEA: 0
NAUSEA: 0
LOSS OF CONSCIOUSNESS: 0
HEADACHES: 0
ABDOMINAL PAIN: 0
NERVOUS/ANXIOUS: 0
SHORTNESS OF BREATH: 0
MYALGIAS: 0

## 2024-09-23 ASSESSMENT — FIBROSIS 4 INDEX: FIB4 SCORE: 1.34

## 2024-09-23 NOTE — PROGRESS NOTES
Chief Complaint   Patient presents with    Follow-Up     History of CVA in August 2022    Atrial Fibrillation    Anticoagulation    Hospital Follow-up    HTN (Controlled)    Hyperlipidemia       Subjective     Jess Rojo is a 64 y.o. female who presents today for nine month follow-up of history of CVA, PAFib, anticoagulation, HTN, and hyperlipidemia.    Jess is a 64 year old female with history of left frontal infarct in August 2022 (treated with tPA and Xarelto), PAFib, anticoagulation, hypertension, hyperlipidemia, and hypothyroidism. She was last seen by me in December 2023.    Since the last visit, she has been struggling with allergies; she is taking OTC Benadryl pretty regularly. Main symptoms are congestion and cough.     She is here today for nine month follow-up. She continues to make good recovery from her CVA. She denies any chest pain, pressure, tightness or discomfort; no symptomatic palpitations; no shortness of breath, orthopnea or PND; no dizziness or syncope; no LE edema. BP is under good control at home.     Past Medical History:   Diagnosis Date    Atrial fibrillation (HCC) 12/2023    Echocardiogram with normal LV size, LVEF 69%. Normal RA, LA and RV. No valvular abnormalities.    Chronic anticoagulation     History of stroke 08/2022    MRI with left frontal infarct.    HSV-2 infection     Hyperlipidemia     Osteopenia     Pseudocholinesterase deficiency     Thyroid disease nodules - thyroidectomy    Varicose veins of both lower extremities      Past Surgical History:   Procedure Laterality Date    VEIN STRIPPING  2005    ABDOMINAL HYSTERECTOMY TOTAL  2005    MENISCUS REPAIR      right knee 9/2017 - Dr. Johnson    THYROIDECTOMY       Family History   Problem Relation Age of Onset    Diabetes Mother     Heart Disease Mother     Stroke Mother     Heart Disease Father      Social History     Socioeconomic History    Marital status:      Spouse name: Not on file    Number of children:  Not on file    Years of education: Not on file    Highest education level: Not on file   Occupational History    Not on file   Tobacco Use    Smoking status: Never    Smokeless tobacco: Never   Vaping Use    Vaping status: Never Used   Substance and Sexual Activity    Alcohol use: No    Drug use: No    Sexual activity: Not on file     Comment: , two kids   Other Topics Concern    Not on file   Social History Narrative    Not on file     Social Determinants of Health     Financial Resource Strain: Not on file   Food Insecurity: Not on file   Transportation Needs: Not on file   Physical Activity: Not on file   Stress: Not on file   Social Connections: Not on file   Intimate Partner Violence: Not on file   Housing Stability: Not on file     Allergies   Allergen Reactions    Other Misc      Anesthesia-- pseudocholinesterace    Succinylcholine      PSEUDOCHOLINE DEFICIENCY     Outpatient Encounter Medications as of 9/23/2024   Medication Sig Dispense Refill    rosuvastatin (CRESTOR) 40 MG tablet Take 1 Tablet by mouth every evening. 100 Tablet 3    rivaroxaban (XARELTO) 20 MG Tab tablet Take 1 Tablet by mouth with dinner. 100 Tablet 3    ezetimibe (ZETIA) 10 MG Tab Take 1 Tablet by mouth every day. 100 Tablet 3    furosemide (LASIX) 20 MG Tab Take 1 Tablet by mouth 1 time a day as needed (swelling). 100 Tablet 1    potassium chloride ER (KLOR-CON) 10 MEQ tablet Take one pill daily with lasix. 100 Tablet 1    metoprolol SR (TOPROL XL) 25 MG TABLET SR 24 HR Take 1 Tablet by mouth every day. 90 Tablet 0    omeprazole (PRILOSEC) 20 MG delayed-release capsule Take 1 Capsule by mouth 2 times a day. 180 Capsule 3    albuterol (VENTOLIN HFA) 108 (90 Base) MCG/ACT Aero Soln inhalation aerosol Inhale 2 Puffs every four hours as needed for Shortness of Breath for up to 90 days. 1 Each 2    levothyroxine (SYNTHROID) 75 MCG Tab Take 1 Tablet by mouth every morning on an empty stomach. And only 1/2 pill on sunday 90 Tablet 3     valACYclovir (VALTREX) 500 MG Tab TAKE ONE TABLET BY MOUTH TWICE A DAY AS NEEDED FOR OUTBREAK 60 Tablet 11    triamcinolone acetonide (KENALOG) 0.1 % Ointment Apply 1 Application. topically 2 times a day. To rash 60 g 0    [DISCONTINUED] rosuvastatin (CRESTOR) 40 MG tablet TAKE ONE TABLET BY MOUTH EVERY EVENING 100 Tablet 0    [DISCONTINUED] ezetimibe (ZETIA) 10 MG Tab Take 1 Tablet by mouth every day. 100 Tablet 3    [DISCONTINUED] rivaroxaban (XARELTO) 20 MG Tab tablet Take 1 Tablet by mouth with dinner. 100 Tablet 3    [DISCONTINUED] furosemide (LASIX) 20 MG Tab Take 1 Tablet by mouth 1 time a day as needed (swelling). 90 Tablet 3    [DISCONTINUED] potassium chloride ER (KLOR-CON) 10 MEQ tablet Take one pill daily with lasix. 90 Tablet 3     No facility-administered encounter medications on file as of 9/23/2024.     Review of Systems   Constitutional:  Negative for chills, fever and malaise/fatigue.   HENT:  Positive for congestion.    Respiratory:  Positive for cough. Negative for shortness of breath.    Cardiovascular:  Negative for chest pain, palpitations, orthopnea, leg swelling and PND.   Gastrointestinal:  Negative for abdominal pain and nausea.   Musculoskeletal:  Negative for myalgias.   Skin:  Negative for rash.   Neurological:  Negative for dizziness, loss of consciousness and headaches.   Endo/Heme/Allergies:  Does not bruise/bleed easily.   Psychiatric/Behavioral:  The patient is not nervous/anxious and does not have insomnia.               Objective     /74 (BP Location: Left arm, Patient Position: Sitting, BP Cuff Size: Adult)   Pulse 86   Resp 14   Ht 1.524 m (5')   Wt 78.5 kg (173 lb)   SpO2 92%   BMI 33.79 kg/m²     Physical Exam  Constitutional:       Appearance: She is well-developed.      Comments: Has made really good recovery from her CVA.   HENT:      Head: Normocephalic.   Neck:      Vascular: No JVD.      Comments: Scar of previous thyroidectomy.  Cardiovascular:      Rate  and Rhythm: Normal rate and regular rhythm.      Heart sounds: Normal heart sounds.   Pulmonary:      Effort: Pulmonary effort is normal. No respiratory distress.      Breath sounds: Normal breath sounds. No wheezing or rales.   Abdominal:      General: Bowel sounds are normal. There is no distension.      Palpations: Abdomen is soft.      Tenderness: There is no abdominal tenderness.   Musculoskeletal:         General: Normal range of motion.      Cervical back: Normal range of motion and neck supple.   Skin:     General: Skin is warm and dry.      Findings: No rash.   Neurological:      General: No focal deficit present.      Mental Status: She is alert and oriented to person, place, and time.      Comments: No obvious neurological deficits.      CONCLUSIONS OF TTE OF 12/14/2023:  Compared to the prior study on 2/28/2022, no changes.  Normal left ventricular systolic function.   No evidence of valvular abnormality based on Doppler evaluation.      FINDINGS OF MRI OF 8/9/2022:  There is focal area of restricted diffusion in the left frontal cortex adjacent to the anterior falx in territory of left anterior cerebral artery distribution.  There is also focal area of restricted diffusion in the right parietal vertex in the cortex and in the right centrum semiovale deep white matter and more inferiorly in the right frontal parietal cortex.  This area is in the distribution of right anterior cerebral artery branches.  There are no extra-axial fluid collections.  Vascular flow void of vessels at the base of the brain is normal with dominant left vertebral artery noted.  Ventricular system is normal in size and configuration.  There are mild foci of increased T2 signal in the periventricular white matter consistent with minimal small vessel ischemic changes and gliosis.  Following infusion of gadolinium contrast there is no evidence of abnormal enhancement.  No evidence of acute hemorrhage is seen.     Echocardiogram of  8/3/2022:  1. The left ventricle is normal in size, thickness, and systolic function. Ejection fraction of 65-70% by Yuen's Biplane. There is grade 1 (impaired relaxation) diastolic function with normal LV filling pressures.   2. No significant valvular abnormalities.   3. Normal right ventricular size, wall thickness and function.   4. Saline bubble study performed with no evidence of interatrial shunt.      CONCLUSIONS OF TTE OF 2/28/2022:  No prior study is available for comparison.   Normal left ventricular systolic function.   No evidence of valvular abnormality based on Doppler evaluation.     FINDINGS OF CTCS OF 9/1/2020:   Coronary calcification:  LMA - 4.2  LCX - 0.0  LAD - 25.5  RCA - 0.0  Total Calcium Score: 29.7    Lab Results   Component Value Date/Time    CHOLSTRLTOT 128 09/16/2024 08:35 AM    LDL 44 09/16/2024 08:35 AM    HDL 66 09/16/2024 08:35 AM    TRIGLYCERIDE 90 09/16/2024 08:35 AM        Lab Results   Component Value Date/Time    ASTSGOT 27 09/16/2024 08:35 AM    ALTSGPT 21 09/16/2024 08:35 AM       Lab Results   Component Value Date/Time    SODIUM 138 09/16/2024 08:35 AM    POTASSIUM 4.2 09/16/2024 08:35 AM    CHLORIDE 102 09/16/2024 08:35 AM    CO2 23 09/16/2024 08:35 AM    GLUCOSE 108 (H) 09/16/2024 08:35 AM    BUN 12 09/16/2024 08:35 AM    CREATININE 0.74 09/16/2024 08:35 AM    BUNCREATRAT 15.7 03/19/2023 04:47 AM        Assessment & Plan     1. Cerebrovascular accident (CVA) due to embolism of precerebral artery (HCC)  rosuvastatin (CRESTOR) 40 MG tablet    ezetimibe (ZETIA) 10 MG Tab      2. History of stroke - 8/2022        3. Ischemic stroke (HCC)  rivaroxaban (XARELTO) 20 MG Tab tablet      4. Paroxysmal atrial fibrillation (HCC)  furosemide (LASIX) 20 MG Tab    potassium chloride ER (KLOR-CON) 10 MEQ tablet      5. Atrial fibrillation with RVR (HCC)        6. Chronic anticoagulation  rivaroxaban (XARELTO) 20 MG Tab tablet      7. Essential hypertension        8. Dyslipidemia   rosuvastatin (CRESTOR) 40 MG tablet    ezetimibe (ZETIA) 10 MG Tab      9. Localized edema  furosemide (LASIX) 20 MG Tab    potassium chloride ER (KLOR-CON) 10 MEQ tablet      10. Postoperative hypothyroidism        11. BMI 33.0-33.9,adult            Medical Decision Making: Today's Assessment/Status/Plan:      1. History of CVA in August 2022, with good recovery. She continues to make good progress. No further symptoms. Continue Xarelto and statin/Zetia.    2. History of PAFib, in sinus rhythm on Toprol XL 25mg once daily.    3. Chronic anticoagulation with Xarelto. [id number] score 6 (prior CVA, HTN, female, age and vascular disease). No bleeding problems.    4. Hypertension, treated Toprol XL 25mg once daily. BP is good today.    5. Hyperlipidemia/history of CVA/mildly elevated CTCS score, on Crestor 40mg and Zetia 10mg once daily. Last lipid panel was excellent.    6. LE edema, uses Lasix and KCl 2-3 times per month, stable.    7. Hypothyroidism, treated with Synthroid. Last TSH was normal.    8. BMI 33.79. Weight is up 20 pounds since 2022. Working on dietary changes.    9. Allergic rhinitis symptoms. Advised NOT take Benadryl daily; can use other OTC antihistamines. Advised to discuss with PCP about treatment options and/or referral to ENT/Allergist.    Same medications for now.  Continue to monitor BP at home.  Keep working on dietary changes and regularly exercise.    Labs per PCP.  Follow-up with me in 6 months, sooner if clinical condition changes.

## 2024-09-24 ENCOUNTER — TELEPHONE (OUTPATIENT)
Dept: MEDICAL GROUP | Facility: LAB | Age: 64
End: 2024-09-24
Payer: MEDICARE

## 2024-09-24 RX ORDER — AZELASTINE HYDROCHLORIDE, FLUTICASONE PROPIONATE 137; 50 UG/1; UG/1
1 SPRAY, METERED NASAL
Qty: 23 G | Refills: 2 | Status: SHIPPED | OUTPATIENT
Start: 2024-09-24

## 2024-09-24 NOTE — TELEPHONE ENCOUNTER
Patient called and LVM she said that her Cardiologist recommended that she take Dymista nasal spray and is wanting you to prescribe this.

## 2024-10-02 ENCOUNTER — OFFICE VISIT (OUTPATIENT)
Dept: MEDICAL GROUP | Facility: LAB | Age: 64
End: 2024-10-02
Payer: MEDICARE

## 2024-10-02 VITALS
BODY MASS INDEX: 33.38 KG/M2 | TEMPERATURE: 98.4 F | HEIGHT: 60 IN | DIASTOLIC BLOOD PRESSURE: 80 MMHG | HEART RATE: 66 BPM | RESPIRATION RATE: 16 BRPM | OXYGEN SATURATION: 96 % | WEIGHT: 170 LBS | SYSTOLIC BLOOD PRESSURE: 130 MMHG

## 2024-10-02 DIAGNOSIS — R05.3 PERSISTENT COUGH: ICD-10-CM

## 2024-10-02 DIAGNOSIS — N64.4 BREAST PAIN, RIGHT: ICD-10-CM

## 2024-10-02 DIAGNOSIS — M79.621 AXILLARY PAIN, RIGHT: ICD-10-CM

## 2024-10-02 PROCEDURE — 99214 OFFICE O/P EST MOD 30 MIN: CPT | Performed by: NURSE PRACTITIONER

## 2024-10-02 PROCEDURE — 3075F SYST BP GE 130 - 139MM HG: CPT | Performed by: NURSE PRACTITIONER

## 2024-10-02 PROCEDURE — 3079F DIAST BP 80-89 MM HG: CPT | Performed by: NURSE PRACTITIONER

## 2024-10-02 RX ORDER — FLUTICASONE PROPIONATE 50 MCG
2 SPRAY, SUSPENSION (ML) NASAL DAILY
COMMUNITY
Start: 2024-10-02

## 2024-10-02 ASSESSMENT — FIBROSIS 4 INDEX: FIB4 SCORE: 1.34

## 2024-10-17 ENCOUNTER — HOSPITAL ENCOUNTER (OUTPATIENT)
Dept: RADIOLOGY | Facility: MEDICAL CENTER | Age: 64
End: 2024-10-17
Attending: NURSE PRACTITIONER
Payer: MEDICARE

## 2024-10-17 DIAGNOSIS — N64.4 BREAST PAIN, RIGHT: ICD-10-CM

## 2024-10-17 DIAGNOSIS — M79.621 AXILLARY PAIN, RIGHT: ICD-10-CM

## 2024-10-17 PROCEDURE — G0279 TOMOSYNTHESIS, MAMMO: HCPCS | Mod: 26,RT

## 2024-11-29 DIAGNOSIS — I48.91 ATRIAL FIBRILLATION WITH RVR (HCC): ICD-10-CM

## 2024-11-29 DIAGNOSIS — I48.0 PAROXYSMAL ATRIAL FIBRILLATION (HCC): ICD-10-CM

## 2024-11-29 NOTE — TELEPHONE ENCOUNTER
Received request via: Pharmacy    Was the patient seen in the last year in this department? Yes  LOV : 10/2/2024   Does the patient have an active prescription (recently filled or refills available) for medication(s) requested? Yes.     Pharm[facility] Name: [facility]    Does the patient have longterm Plus and need 100-day supply? (This applies to ALL medications)    none

## 2024-12-01 RX ORDER — METOPROLOL SUCCINATE 25 MG/1
25 TABLET, EXTENDED RELEASE ORAL DAILY
Qty: 90 TABLET | Refills: 0 | Status: SHIPPED | OUTPATIENT
Start: 2024-12-01

## 2024-12-30 RX ORDER — LEVOTHYROXINE SODIUM 75 UG/1
TABLET ORAL
Qty: 90 TABLET | Refills: 0 | Status: SHIPPED | OUTPATIENT
Start: 2024-12-30

## 2025-02-18 ENCOUNTER — HOSPITAL ENCOUNTER (EMERGENCY)
Facility: MEDICAL CENTER | Age: 65
End: 2025-02-18
Attending: STUDENT IN AN ORGANIZED HEALTH CARE EDUCATION/TRAINING PROGRAM
Payer: MEDICARE

## 2025-02-18 ENCOUNTER — APPOINTMENT (OUTPATIENT)
Dept: RADIOLOGY | Facility: MEDICAL CENTER | Age: 65
End: 2025-02-18
Attending: STUDENT IN AN ORGANIZED HEALTH CARE EDUCATION/TRAINING PROGRAM
Payer: MEDICARE

## 2025-02-18 VITALS
DIASTOLIC BLOOD PRESSURE: 116 MMHG | OXYGEN SATURATION: 94 % | TEMPERATURE: 98.8 F | BODY MASS INDEX: 34.5 KG/M2 | SYSTOLIC BLOOD PRESSURE: 164 MMHG | WEIGHT: 175.71 LBS | RESPIRATION RATE: 18 BRPM | HEIGHT: 60 IN | HEART RATE: 75 BPM

## 2025-02-18 DIAGNOSIS — R51.9 GENERALIZED HEADACHE: ICD-10-CM

## 2025-02-18 PROCEDURE — A9270 NON-COVERED ITEM OR SERVICE: HCPCS | Performed by: STUDENT IN AN ORGANIZED HEALTH CARE EDUCATION/TRAINING PROGRAM

## 2025-02-18 PROCEDURE — 70450 CT HEAD/BRAIN W/O DYE: CPT

## 2025-02-18 PROCEDURE — 700102 HCHG RX REV CODE 250 W/ 637 OVERRIDE(OP): Performed by: STUDENT IN AN ORGANIZED HEALTH CARE EDUCATION/TRAINING PROGRAM

## 2025-02-18 PROCEDURE — 99283 EMERGENCY DEPT VISIT LOW MDM: CPT

## 2025-02-18 RX ORDER — ACETAMINOPHEN 325 MG/1
650 TABLET ORAL ONCE
Status: COMPLETED | OUTPATIENT
Start: 2025-02-18 | End: 2025-02-18

## 2025-02-18 RX ADMIN — ACETAMINOPHEN 650 MG: 325 TABLET ORAL at 17:33

## 2025-02-18 ASSESSMENT — FIBROSIS 4 INDEX: FIB4 SCORE: 1.34

## 2025-02-18 NOTE — ED TRIAGE NOTES
"Chief Complaint   Patient presents with    Headache     Started this am   Left side  \"Comes and goes\"   Currently taking Xarelto for hx CVA        BP (!) 164/116   Pulse 94   Temp 37.1 °C (98.8 °F) (Temporal)   Resp 15   Ht 1.524 m (5')   Wt 79.7 kg (175 lb 11.3 oz)   SpO2 94%   BMI 34.32 kg/m²     Pt ambulated to ED w/ visitor for c/o HA started earlier this am, advised to come to ED for further eval.    "

## 2025-02-19 NOTE — ED PROVIDER NOTES
"ED Provider Note    CHIEF COMPLAINT  Chief Complaint   Patient presents with    Headache     Started this am   Left side  \"Comes and goes\"   Currently taking Xarelto for hx CVA          EXTERNAL RECORDS REVIEWED  Outpatient Notes patient saw family practitioner 10/2/2024 for follow-up of right-sided breast pain and axillary discomfort for which she was ordered for a breast ultrasound.  Patient saw cardiology 9/23/2024 for follow-up following a CVA with a stroke in 2022 likely resulting from atrial fibrillation.  Patient notably takes Xarelto..    HPI/ROS  LIMITATION TO HISTORY   Select: : None      Jess Rojo is a 64 y.o. female who presents to the emergency department for evaluation of a headache.  The patient reports that it woke her up from sleep this morning described as an ice pick sensation in the back of her head and radiates around the front of the left side of her head.  She reports that she has a history of recurrent headaches with weather changes in [place] but has not had one in years since moving to [place].  She states that the headache was an 8 out of 10 initially but is currently a 3 out of 10.  She took some Tylenol this morning which helped.  She denies any blurry or double vision, difficulty swallowing or speaking, dizziness, difficulty walking, numbness or weakness in the arms legs or face.  She notes that she has a history of a prior stroke and 2022 that caused hemiparesis of the left side of her body but with rehab she currently only has mild intermittent weakness of the left fourth and fifth fingers.  She denies chest pain or pressure, trouble breathing.  She has been taking her medication as prescribed including her blood thinner which she last took last night.  She denies any falls injuries or trauma to her head.  She denies fevers, neck pain or stiffness.    PAST MEDICAL HISTORY   has a past medical history of Atrial fibrillation (HCC) (12/2023), Chronic anticoagulation, History of " stroke (08/2022), HSV-2 infection, Hyperlipidemia, Osteopenia, Pseudocholinesterase deficiency, Thyroid disease (nodules - thyroidectomy), and Varicose veins of both lower extremities.    SURGICAL HISTORY   has a past surgical history that includes thyroidectomy; vein stripping (2005); meniscus repair; and abdominal hysterectomy total (2005).    FAMILY HISTORY  Family History   Problem Relation Age of Onset    Diabetes Mother     Heart Disease Mother     Stroke Mother     Heart Disease Father        SOCIAL HISTORY  Social History     Tobacco Use    Smoking status: Never    Smokeless tobacco: Never   Vaping Use    Vaping status: Never Used   Substance and Sexual Activity    Alcohol use: No    Drug use: No    Sexual activity: Not on file     Comment: , two kids       CURRENT MEDICATIONS  Home Medications    **Home medications have not yet been reviewed for this encounter**         ALLERGIES  Allergies   Allergen Reactions    Other Misc      Anesthesia-- pseudocholinesterace    Succinylcholine      PSEUDOCHOLINE DEFICIENCY       PHYSICAL EXAM  VITAL SIGNS: BP (!) 164/116   Pulse 94   Temp 37.1 °C (98.8 °F) (Temporal)   Resp 15   Ht 1.524 m (5')   Wt 79.7 kg (175 lb 11.3 oz)   SpO2 94%   BMI 34.32 kg/m²    Constitutional: No acute distress, very pleasant sitting upright in the chair and answering all questions appropriately  HEENT: Atraumatic, normocephalic, pupils are equal round reactive to light, nose normal, mouth shows moist mucous membranes  Neck: Supple, no JVD, no tracheal deviation  Cardiovascular: Regular rate and rhythm, no murmur, rub or gallop, 2+ pulses peripherally x4  Thorax & Lungs: No respiratory distress, no wheezes, rales or rhonchi, no chest wall tenderness.  GI: Soft, non-distended, non-tender, no rebound  Skin: Warm, dry, no acute rash or lesion  Musculoskeletal: Moving all extremities, no acute deformity, no edema, no tenderness  Neurologic: A&Ox3, at baseline mentation, cranial  nerves II through XII are intact, 5 out of 5 strength and normal sensation throughout upper and lower extremities proximally and distally bilaterally.  Normal finger-nose-finger, no pronator drift.  Ambulatory with a steady gait.  No ataxia.  No dysarthria.  Psychiatric: Appropriate affect for situation at this time    RADIOLOGY/PROCEDURES   I have independently interpreted the diagnostic imaging associated with this visit and am waiting the final reading from the radiologist.   My preliminary interpretation is as follows: No intracranial hemorrhage appreciated on CT    Radiologist interpretation:  CT-HEAD W/O   Final Result      No evidence of acute intracranial process.                   COURSE & MEDICAL DECISION MAKING    ASSESSMENT, COURSE AND PLAN  Care Narrative:     Patient with a remote history of recurrent headaches presents to the ER today for evaluation of a left-sided headache, currently improved significantly.  History of prior CVA and takes Xarelto daily with the last dose yesterday.  Currently patient in minimal pain with a completely normal examination including  normal neurologic assessment with no deficits.  No meningismus, nausea or vomiting.  Feel subarachnoid hemorrhage is very unlikely.  No fever or infectious prodrome suggestive of meningitis.  She is anticoagulated and with the above I feel venous sinus thrombosis is very unlikely.  Given that she is anticoagulated she is at risk of hemorrhagic stroke.  Will plan for CT head without contrast to assess for such.  Otherwise we will treat pain with Tylenol.    CT head was unremarkable.  Patient feeling better after treatment with Tylenol reporting resolved symptoms.  I feel she can follow-up safely with her outpatient care team and resume her medications as prescribed.  Return precautions discussed and all questions answered and she was discharged in stable condition.    ADDITIONAL PROBLEMS MANAGED  None    DISPOSITION AND DISCUSSIONS  I have  discussed management of the patient with the following physicians and NORA's: None    Discussion of management with other QHP or appropriate source(s): None     Escalation of care considered, and ultimately not performed:Laboratory analysis    FINAL DIAGNOSIS  1. Generalized headache         Electronically signed by: Jerardo Tobin M.D., 2/18/2025 5:15 PM

## 2025-02-19 NOTE — DISCHARGE INSTRUCTIONS
You can take Tylenol every 6 hours as needed for ongoing treatment of your headache.  Continue to take your medications as prescribed and follow-up with your primary care doctor for recheck if your headaches become more regular.  Return to the ER with any sudden onset severe headache, vision change, difficulty swallowing or speaking, recurrent vomiting, numbness or weakness or if you are otherwise feeling worse.

## 2025-03-01 DIAGNOSIS — I48.91 ATRIAL FIBRILLATION WITH RVR (HCC): ICD-10-CM

## 2025-03-01 DIAGNOSIS — I48.0 PAROXYSMAL ATRIAL FIBRILLATION (HCC): ICD-10-CM

## 2025-03-03 RX ORDER — METOPROLOL SUCCINATE 25 MG/1
25 TABLET, EXTENDED RELEASE ORAL DAILY
Qty: 90 TABLET | Refills: 0 | Status: SHIPPED | OUTPATIENT
Start: 2025-03-03 | End: 2025-03-04 | Stop reason: SDUPTHER

## 2025-03-04 ENCOUNTER — OFFICE VISIT (OUTPATIENT)
Dept: CARDIOLOGY | Facility: MEDICAL CENTER | Age: 65
End: 2025-03-04
Attending: NURSE PRACTITIONER
Payer: MEDICARE

## 2025-03-04 ENCOUNTER — OFFICE VISIT (OUTPATIENT)
Dept: MEDICAL GROUP | Facility: LAB | Age: 65
End: 2025-03-04
Payer: MEDICARE

## 2025-03-04 VITALS
HEART RATE: 74 BPM | HEIGHT: 60 IN | WEIGHT: 171.6 LBS | SYSTOLIC BLOOD PRESSURE: 104 MMHG | RESPIRATION RATE: 16 BRPM | OXYGEN SATURATION: 96 % | DIASTOLIC BLOOD PRESSURE: 72 MMHG | BODY MASS INDEX: 33.69 KG/M2

## 2025-03-04 VITALS
WEIGHT: 169 LBS | BODY MASS INDEX: 33.18 KG/M2 | HEART RATE: 66 BPM | DIASTOLIC BLOOD PRESSURE: 82 MMHG | RESPIRATION RATE: 16 BRPM | OXYGEN SATURATION: 96 % | TEMPERATURE: 96.8 F | HEIGHT: 60 IN | SYSTOLIC BLOOD PRESSURE: 120 MMHG

## 2025-03-04 DIAGNOSIS — I10 ESSENTIAL HYPERTENSION: ICD-10-CM

## 2025-03-04 DIAGNOSIS — R06.83 SNORING: ICD-10-CM

## 2025-03-04 DIAGNOSIS — E89.0 POSTOPERATIVE HYPOTHYROIDISM: ICD-10-CM

## 2025-03-04 DIAGNOSIS — R68.89 FLUCTUATION OF WEIGHT: ICD-10-CM

## 2025-03-04 DIAGNOSIS — R40.0 DAYTIME SLEEPINESS: ICD-10-CM

## 2025-03-04 DIAGNOSIS — R73.01 IMPAIRED FASTING GLUCOSE: ICD-10-CM

## 2025-03-04 DIAGNOSIS — Z79.01 CHRONIC ANTICOAGULATION: ICD-10-CM

## 2025-03-04 DIAGNOSIS — Z86.73 HISTORY OF STROKE: ICD-10-CM

## 2025-03-04 DIAGNOSIS — I48.0 PAROXYSMAL ATRIAL FIBRILLATION (HCC): ICD-10-CM

## 2025-03-04 DIAGNOSIS — E78.5 DYSLIPIDEMIA: ICD-10-CM

## 2025-03-04 DIAGNOSIS — D68.318 CIRCULATING ANTICOAGULANTS (HCC): ICD-10-CM

## 2025-03-04 DIAGNOSIS — R51.9 ACUTE NONINTRACTABLE HEADACHE, UNSPECIFIED HEADACHE TYPE: ICD-10-CM

## 2025-03-04 PROCEDURE — 3074F SYST BP LT 130 MM HG: CPT | Performed by: NURSE PRACTITIONER

## 2025-03-04 PROCEDURE — 99212 OFFICE O/P EST SF 10 MIN: CPT | Performed by: NURSE PRACTITIONER

## 2025-03-04 PROCEDURE — 99214 OFFICE O/P EST MOD 30 MIN: CPT | Performed by: NURSE PRACTITIONER

## 2025-03-04 PROCEDURE — 3078F DIAST BP <80 MM HG: CPT | Performed by: NURSE PRACTITIONER

## 2025-03-04 PROCEDURE — 3079F DIAST BP 80-89 MM HG: CPT | Performed by: NURSE PRACTITIONER

## 2025-03-04 RX ORDER — METOPROLOL SUCCINATE 50 MG/1
50 TABLET, EXTENDED RELEASE ORAL DAILY
Qty: 30 TABLET | Refills: 2
Start: 2025-03-04 | End: 2025-03-11 | Stop reason: SDUPTHER

## 2025-03-04 ASSESSMENT — ENCOUNTER SYMPTOMS
CHILLS: 0
PND: 0
INSOMNIA: 0
HEADACHES: 0
DIZZINESS: 0
BRUISES/BLEEDS EASILY: 0
NAUSEA: 0
ORTHOPNEA: 0
FEVER: 0
MYALGIAS: 0
PALPITATIONS: 0
LOSS OF CONSCIOUSNESS: 0
SHORTNESS OF BREATH: 0
NERVOUS/ANXIOUS: 0
ABDOMINAL PAIN: 0

## 2025-03-04 ASSESSMENT — FIBROSIS 4 INDEX
FIB4 SCORE: 1.34
FIB4 SCORE: 1.34

## 2025-03-04 ASSESSMENT — PATIENT HEALTH QUESTIONNAIRE - PHQ9: CLINICAL INTERPRETATION OF PHQ2 SCORE: 0

## 2025-03-04 NOTE — PROGRESS NOTES
Chief Complaint   Patient presents with    Follow-Up     History of CVA (2022)    Atrial Fibrillation    Anticoagulation    HTN (Controlled)    Hyperlipidemia    Hypothyroidism       Subjective     Jess Rojo is a 64 y.o. female who presents today for six month follow-up of history of CVA, PAFib, anticoagulation, HTN, hyperlipidemia, and hypothyroidism.    Jess is a 64 year old female with history of left frontal infarct in August 2022 (treated with tPA and Xarelto), PAFib, anticoagulation, hypertension, hyperlipidemia, and hypothyroidism. She was last seen by me in September 2024.     On 2/18/2025, she presented to [facility] ER with headache; CT scan of the head was negative, and she was treated with Tylenol.    Just this morning, she saw her PCP; Metoprolol was increased from 25mg to 50mg for better BP control. She has also been referred for home sleep study, given ongoing fatigue and brain fog.     She is here today for six month follow-up. She is walking daily, up to 5 miles, and trying to eat healthfully, but is concerned about weight gain.    No further headaches or neurological symptoms. BP has been running a little higher at home, up to 140+ systolic.    She denies any chest pain, pressure, tightness or discomfort; no symptomatic palpitations; no shortness of breath, orthopnea or PND; no dizziness or syncope; no LE edema.     Past Medical History:   Diagnosis Date    Atrial fibrillation (HCC) 12/2023    Echocardiogram with normal LV size, LVEF 69%. Normal RA, LA and RV. No valvular abnormalities.    Chronic anticoagulation     History of stroke 08/2022    MRI with left frontal infarct.    HSV-2 infection     Hyperlipidemia     Osteopenia     Pseudocholinesterase deficiency     Thyroid disease nodules - thyroidectomy    Varicose veins of both lower extremities      Past Surgical History:   Procedure Laterality Date    VEIN STRIPPING  2005    ABDOMINAL HYSTERECTOMY TOTAL  2005    MENISCUS REPAIR      right  knee 9/2017 - Dr. Alex ORONA       Family History   Problem Relation Age of Onset    Diabetes Mother     Heart Disease Mother     Stroke Mother     Heart Disease Father      Social History     Socioeconomic History    Marital status:      Spouse name: Not on file    Number of children: Not on file    Years of education: Not on file    Highest education level: Not on file   Occupational History    Not on file   Tobacco Use    Smoking status: Never    Smokeless tobacco: Never   Vaping Use    Vaping status: Never Used   Substance and Sexual Activity    Alcohol use: No    Drug use: No    Sexual activity: Not on file     Comment: , two kids   Other Topics Concern    Not on file   Social History Narrative    Not on file     Social Drivers of Health     Financial Resource Strain: Not on file   Food Insecurity: Not on file   Transportation Needs: Not on file   Physical Activity: Not on file   Stress: Not on file   Social Connections: Not on file   Intimate Partner Violence: Not on file   Housing Stability: Not on file     Allergies   Allergen Reactions    Other Misc      Anesthesia-- pseudocholinesterace    Succinylcholine      PSEUDOCHOLINE DEFICIENCY     Outpatient Encounter Medications as of 3/4/2025   Medication Sig Dispense Refill    metoprolol SR (TOPROL XL) 50 MG TABLET SR 24 HR Take 1 Tablet by mouth every day. (Patient taking differently: Take 50 mg by mouth every day. Hasn't started talking yet) 30 Tablet 2    levothyroxine (SYNTHROID) 75 MCG Tab TAKE 1 TABLET BY MOUTH IN THE MORNING ON AN EMPTY STOMACH with 1/2 tablet on sunday 90 Tablet 0    fluticasone (FLONASE) 50 MCG/ACT nasal spray Administer 2 Sprays into affected nostril(S) every day.      rosuvastatin (CRESTOR) 40 MG tablet Take 1 Tablet by mouth every evening. 100 Tablet 3    rivaroxaban (XARELTO) 20 MG Tab tablet Take 1 Tablet by mouth with dinner. 100 Tablet 3    ezetimibe (ZETIA) 10 MG Tab Take 1 Tablet by mouth every day.  100 Tablet 3    furosemide (LASIX) 20 MG Tab Take 1 Tablet by mouth 1 time a day as needed (swelling). 100 Tablet 1    potassium chloride ER (KLOR-CON) 10 MEQ tablet Take one pill daily with lasix. 100 Tablet 1    omeprazole (PRILOSEC) 20 MG delayed-release capsule Take 1 Capsule by mouth 2 times a day. 180 Capsule 3    valACYclovir (VALTREX) 500 MG Tab TAKE ONE TABLET BY MOUTH TWICE A DAY AS NEEDED FOR OUTBREAK 60 Tablet 11    [DISCONTINUED] triamcinolone acetonide (KENALOG) 0.1 % Ointment Apply 1 Application. topically 2 times a day. To rash (Patient not taking: Reported on 3/4/2025) 60 g 0     No facility-administered encounter medications on file as of 3/4/2025.     Review of Systems   Constitutional:  Negative for chills, fever and malaise/fatigue.   Respiratory:  Negative for shortness of breath.    Cardiovascular:  Negative for chest pain, palpitations, orthopnea, leg swelling and PND.   Gastrointestinal:  Negative for abdominal pain and nausea.   Musculoskeletal:  Negative for myalgias.   Skin:  Negative for rash.   Neurological:  Negative for dizziness, loss of consciousness and headaches.   Endo/Heme/Allergies:  Does not bruise/bleed easily.   Psychiatric/Behavioral:  The patient is not nervous/anxious and does not have insomnia.               Objective     /72 (BP Location: Left arm, Patient Position: Sitting, BP Cuff Size: Adult)   Pulse 74   Resp 16   Ht 1.524 m (5')   Wt 77.8 kg (171 lb 9.6 oz)   SpO2 96%   BMI 33.51 kg/m²     Physical Exam  Constitutional:       Appearance: She is well-developed.      Comments: Has made really good recovery from her CVA.   HENT:      Head: Normocephalic.   Neck:      Vascular: No JVD.      Comments: Scar of previous thyroidectomy.  Cardiovascular:      Rate and Rhythm: Normal rate and regular rhythm.      Heart sounds: Normal heart sounds.   Pulmonary:      Effort: Pulmonary effort is normal. No respiratory distress.      Breath sounds: Normal breath  sounds. No wheezing or rales.   Abdominal:      General: Bowel sounds are normal. There is no distension.      Palpations: Abdomen is soft.      Tenderness: There is no abdominal tenderness.   Musculoskeletal:         General: Normal range of motion.      Cervical back: Normal range of motion and neck supple.   Skin:     General: Skin is warm and dry.      Findings: No rash.   Neurological:      General: No focal deficit present.      Mental Status: She is alert and oriented to person, place, and time.      Comments: No obvious neurological deficits.        CT SCANS:    IMPRESSION OF CT SCAN OF HEAD OF 2/18/2025:  No evidence of acute intracranial process.    FINDINGS OF CTCS OF 9/1/2020:   Coronary calcification:  LMA - 4.2  LCX - 0.0  LAD - 25.5  RCA - 0.0  Total Calcium Score: 29.7    ECHOCARDIOGRAMS:    CONCLUSIONS OF TTE OF 12/14/2023:  Compared to the prior study on 2/28/2022, no changes.  Normal left ventricular systolic function.   No evidence of valvular abnormality based on Doppler evaluation.      Echocardiogram of 8/3/2022:  1. The left ventricle is normal in size, thickness, and systolic function. Ejection fraction of 65-70% by Yuen's Biplane. There is grade 1 (impaired relaxation) diastolic function with normal LV filling pressures.   2. No significant valvular abnormalities.   3. Normal right ventricular size, wall thickness and function.   4. Saline bubble study performed with no evidence of interatrial shunt.      CONCLUSIONS OF TTE OF 2/28/2022:  No prior study is available for comparison.   Normal left ventricular systolic function.   No evidence of valvular abnormality based on Doppler evaluation.     LABS:    Lab Results   Component Value Date/Time    CHOLSTRLTOT 128 09/16/2024 08:35 AM    LDL 44 09/16/2024 08:35 AM    HDL 66 09/16/2024 08:35 AM    TRIGLYCERIDE 90 09/16/2024 08:35 AM        Lab Results   Component Value Date/Time    ASTSGOT 27 09/16/2024 08:35 AM    ALTSGPT 21 09/16/2024  08:35 AM       Lab Results   Component Value Date/Time    SODIUM 138 09/16/2024 08:35 AM    POTASSIUM 4.2 09/16/2024 08:35 AM    CHLORIDE 102 09/16/2024 08:35 AM    CO2 23 09/16/2024 08:35 AM    GLUCOSE 108 (H) 09/16/2024 08:35 AM    BUN 12 09/16/2024 08:35 AM    CREATININE 0.74 09/16/2024 08:35 AM    BUNCREATRAT 15.7 03/19/2023 04:47 AM        Assessment & Plan     1. Paroxysmal atrial fibrillation (HCC)        2. Chronic anticoagulation        3. Circulating anticoagulants (HCC)        4. History of stroke - 8/2022        5. Essential hypertension        6. Dyslipidemia        7. Postoperative hypothyroidism        8. BMI 33.0-33.9,adult            Medical Decision Making: Today's Assessment/Status/Plan:        PAFib, in sinus rhythm on Metoprolol. Recently increased from 25mg to 50mg for better BP control.  Chronic anticoagulation with Xarelto ([id number] score 6 = CVA, HTN, female, age and vascular disease). No bleeding problems. CT scan last month was negative for bleed.  History of CVA in August 2022, with good recovery. She still has some occasional short term memory challenges, but overall is doing well. Continue Xarelto and statin/Zetia.  Hypertension, treated with Toprol XL, now 50mg. Keep track of BP at home. If not tolerating well, consider another agent.  Hyperlipidemia/mildly elevated CAC score, on Crestor 40mg and Zetia 10mg. Last lipid panel was excellent/at goal.  Hypothyroidism, postoperative, treated with Synthroid.  BMI 33.51. She is working on walking daily and healthy diet. Her PCP ordered a sleep study, which is very appropriate.  ER visit for headache, now resolved.    Same medications for now, including increased Metoprolol dose.  Labs per PCP.  Proceed with sleep study.    Follow-up in 6 months, sooner if clinical condition changes.    HCC Gap Form    Diagnosis to address: I48.0 - Paroxysmal atrial fibrillation (HCC)  Assessment and plan: Chronic, stable. Continue with current defined  treatment plan: continue Toprol and Xarelto. Follow-up at least annually.  Last edited 03/04/25 09:47 PST by LILLY Alexis
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
## 2025-03-06 ENCOUNTER — TELEPHONE (OUTPATIENT)
Dept: CARDIOLOGY | Facility: MEDICAL CENTER | Age: 65
End: 2025-03-06
Payer: MEDICARE

## 2025-03-06 DIAGNOSIS — I48.0 PAROXYSMAL ATRIAL FIBRILLATION (HCC): ICD-10-CM

## 2025-03-06 NOTE — TELEPHONE ENCOUNTER
Phone Number Called: [phone]     Call outcome: Left detailed message for patient. Informed to call back with any additional questions.    Message: Called to notify pt that she will need to contact the prescribing physicians office to assist  with her concern about her metoprolol.

## 2025-03-06 NOTE — TELEPHONE ENCOUNTER
AB    Caller: Jess Rojo    Topic/issue: Patient called because the new medication   metoprolol SR (TOPROL XL) 50 MG TABLET SR 24 HR has not been received by the [facility] in [place]. She is requesting it be resent. She does not have any of the medication.     Please advise.     Callback Number: [phone]    Thank you,     Archana D

## 2025-03-10 ENCOUNTER — TELEPHONE (OUTPATIENT)
Dept: HEALTH INFORMATION MANAGEMENT | Facility: OTHER | Age: 65
End: 2025-03-10
Payer: MEDICARE

## 2025-03-11 RX ORDER — METOPROLOL SUCCINATE 50 MG/1
50 TABLET, EXTENDED RELEASE ORAL DAILY
Qty: 100 TABLET | Refills: 3 | Status: SHIPPED | OUTPATIENT
Start: 2025-03-11

## 2025-03-12 ENCOUNTER — TELEPHONE (OUTPATIENT)
Dept: MEDICAL GROUP | Facility: LAB | Age: 65
End: 2025-03-12
Payer: MEDICARE

## 2025-03-12 NOTE — TELEPHONE ENCOUNTER
Patient called and LVM wanting to know if she was to take the metoprolol at night still?  All 50mg?

## 2025-03-31 ENCOUNTER — TELEPHONE (OUTPATIENT)
Dept: MEDICAL GROUP | Facility: LAB | Age: 65
End: 2025-03-31
Payer: MEDICARE

## 2025-03-31 DIAGNOSIS — R06.83 SNORING: ICD-10-CM

## 2025-03-31 DIAGNOSIS — R40.0 DAYTIME SLEEPINESS: ICD-10-CM

## 2025-03-31 NOTE — TELEPHONE ENCOUNTER
Patient called and LVM stating that she does not want to do the overnight sleep study at home. She wants to do this at the sleep center. Can you order this for her?

## 2025-04-07 ENCOUNTER — APPOINTMENT (OUTPATIENT)
Dept: SLEEP MEDICINE | Facility: MEDICAL CENTER | Age: 65
End: 2025-04-07
Attending: NURSE PRACTITIONER
Payer: MEDICARE

## 2025-04-08 DIAGNOSIS — R60.0 LOCALIZED EDEMA: ICD-10-CM

## 2025-04-08 DIAGNOSIS — I48.0 PAROXYSMAL ATRIAL FIBRILLATION (HCC): ICD-10-CM

## 2025-04-08 RX ORDER — LEVOTHYROXINE SODIUM 75 UG/1
TABLET ORAL
Qty: 90 TABLET | Refills: 0 | Status: SHIPPED | OUTPATIENT
Start: 2025-04-08

## 2025-04-09 DIAGNOSIS — I48.0 PAROXYSMAL ATRIAL FIBRILLATION (HCC): ICD-10-CM

## 2025-04-09 DIAGNOSIS — I10 ESSENTIAL HYPERTENSION: ICD-10-CM

## 2025-04-09 DIAGNOSIS — R60.0 LOCALIZED EDEMA: ICD-10-CM

## 2025-04-09 RX ORDER — FUROSEMIDE 20 MG/1
20 TABLET ORAL
Qty: 100 TABLET | Refills: 3 | Status: SHIPPED | OUTPATIENT
Start: 2025-04-09

## 2025-05-13 ENCOUNTER — TELEPHONE (OUTPATIENT)
Dept: MEDICAL GROUP | Facility: LAB | Age: 65
End: 2025-05-13
Payer: MEDICARE

## 2025-05-13 DIAGNOSIS — R40.0 DAYTIME SLEEPINESS: ICD-10-CM

## 2025-05-13 DIAGNOSIS — I48.0 PAROXYSMAL ATRIAL FIBRILLATION (HCC): Primary | ICD-10-CM

## 2025-05-14 NOTE — TELEPHONE ENCOUNTER
"Patient called and wants to do the in office sleep study. I see you placed a a referral for this 3/31 and this is what the response says in the referral    Comments:Terra Conner 4/2/2025 12:50 PM PDT  Patient is requesting an in-lab sleep study, which we cannot use a consult referral for. Provider will need to put in a testing order for the sleep study type they want completed - polysomsongram or titration study. Then it will fall into correct queue and be scheduled appropriately.\"     Thank you!  Peyton  "

## 2025-05-22 DIAGNOSIS — G47.30 SLEEP APNEA, UNSPECIFIED TYPE: Primary | ICD-10-CM

## 2025-05-31 DIAGNOSIS — I48.0 PAROXYSMAL ATRIAL FIBRILLATION (HCC): ICD-10-CM

## 2025-05-31 DIAGNOSIS — I48.91 ATRIAL FIBRILLATION WITH RVR (HCC): ICD-10-CM

## 2025-06-02 RX ORDER — METOPROLOL SUCCINATE 25 MG/1
25 TABLET, EXTENDED RELEASE ORAL DAILY
Qty: 90 TABLET | Refills: 0 | Status: SHIPPED | OUTPATIENT
Start: 2025-06-02 | End: 2025-06-24

## 2025-06-10 ENCOUNTER — TELEPHONE (OUTPATIENT)
Dept: HEALTH INFORMATION MANAGEMENT | Facility: OTHER | Age: 65
End: 2025-06-10
Payer: MEDICARE

## 2025-06-24 ENCOUNTER — APPOINTMENT (OUTPATIENT)
Dept: MEDICAL GROUP | Facility: LAB | Age: 65
End: 2025-06-24
Payer: MEDICARE

## 2025-06-24 ENCOUNTER — HOSPITAL ENCOUNTER (OUTPATIENT)
Dept: LAB | Facility: MEDICAL CENTER | Age: 65
End: 2025-06-24
Attending: NURSE PRACTITIONER
Payer: MEDICARE

## 2025-06-24 VITALS
WEIGHT: 171 LBS | DIASTOLIC BLOOD PRESSURE: 80 MMHG | HEART RATE: 56 BPM | BODY MASS INDEX: 33.57 KG/M2 | OXYGEN SATURATION: 94 % | TEMPERATURE: 96.3 F | HEIGHT: 60 IN | SYSTOLIC BLOOD PRESSURE: 132 MMHG | RESPIRATION RATE: 16 BRPM

## 2025-06-24 DIAGNOSIS — R09.82 POST-NASAL DRAINAGE: ICD-10-CM

## 2025-06-24 DIAGNOSIS — E89.0 POSTOPERATIVE HYPOTHYROIDISM: ICD-10-CM

## 2025-06-24 DIAGNOSIS — R73.01 IMPAIRED FASTING GLUCOSE: ICD-10-CM

## 2025-06-24 DIAGNOSIS — R68.89 FLUCTUATION OF WEIGHT: ICD-10-CM

## 2025-06-24 DIAGNOSIS — I48.0 PAROXYSMAL ATRIAL FIBRILLATION (HCC): ICD-10-CM

## 2025-06-24 DIAGNOSIS — I10 ESSENTIAL HYPERTENSION: ICD-10-CM

## 2025-06-24 LAB
ALBUMIN SERPL BCP-MCNC: 4.2 G/DL (ref 3.2–4.9)
ALBUMIN/GLOB SERPL: 1.6 G/DL
ALP SERPL-CCNC: 85 U/L (ref 30–99)
ALT SERPL-CCNC: 33 U/L (ref 2–50)
ANION GAP SERPL CALC-SCNC: 11 MMOL/L (ref 7–16)
AST SERPL-CCNC: 31 U/L (ref 12–45)
BASOPHILS # BLD AUTO: 1 % (ref 0–1.8)
BASOPHILS # BLD: 0.06 K/UL (ref 0–0.12)
BILIRUB SERPL-MCNC: 0.8 MG/DL (ref 0.1–1.5)
BUN SERPL-MCNC: 10 MG/DL (ref 8–22)
CALCIUM ALBUM COR SERPL-MCNC: 8.8 MG/DL (ref 8.5–10.5)
CALCIUM SERPL-MCNC: 9 MG/DL (ref 8.5–10.5)
CHLORIDE SERPL-SCNC: 105 MMOL/L (ref 96–112)
CO2 SERPL-SCNC: 23 MMOL/L (ref 20–33)
CORTIS SERPL-MCNC: 8.4 UG/DL (ref 0–23)
CREAT SERPL-MCNC: 0.99 MG/DL (ref 0.5–1.4)
EOSINOPHIL # BLD AUTO: 0.13 K/UL (ref 0–0.51)
EOSINOPHIL NFR BLD: 2.2 % (ref 0–6.9)
ERYTHROCYTE [DISTWIDTH] IN BLOOD BY AUTOMATED COUNT: 43.7 FL (ref 35.9–50)
EST. AVERAGE GLUCOSE BLD GHB EST-MCNC: 117 MG/DL
GFR SERPLBLD CREATININE-BSD FMLA CKD-EPI: 63 ML/MIN/1.73 M 2
GLOBULIN SER CALC-MCNC: 2.7 G/DL (ref 1.9–3.5)
GLUCOSE SERPL-MCNC: 100 MG/DL (ref 65–99)
HBA1C MFR BLD: 5.7 % (ref 4–5.6)
HCT VFR BLD AUTO: 46.8 % (ref 37–47)
HGB BLD-MCNC: 15.7 G/DL (ref 12–16)
IMM GRANULOCYTES # BLD AUTO: 0.02 K/UL (ref 0–0.11)
IMM GRANULOCYTES NFR BLD AUTO: 0.3 % (ref 0–0.9)
LYMPHOCYTES # BLD AUTO: 1.68 K/UL (ref 1–4.8)
LYMPHOCYTES NFR BLD: 28.3 % (ref 22–41)
MCH RBC QN AUTO: 31.7 PG (ref 27–33)
MCHC RBC AUTO-ENTMCNC: 33.5 G/DL (ref 32.2–35.5)
MCV RBC AUTO: 94.4 FL (ref 81.4–97.8)
MONOCYTES # BLD AUTO: 0.64 K/UL (ref 0–0.85)
MONOCYTES NFR BLD AUTO: 10.8 % (ref 0–13.4)
NEUTROPHILS # BLD AUTO: 3.41 K/UL (ref 1.82–7.42)
NEUTROPHILS NFR BLD: 57.4 % (ref 44–72)
NRBC # BLD AUTO: 0 K/UL
NRBC BLD-RTO: 0 /100 WBC (ref 0–0.2)
PLATELET # BLD AUTO: 297 K/UL (ref 164–446)
PMV BLD AUTO: 9.6 FL (ref 9–12.9)
POTASSIUM SERPL-SCNC: 4.1 MMOL/L (ref 3.6–5.5)
PROT SERPL-MCNC: 6.9 G/DL (ref 6–8.2)
RBC # BLD AUTO: 4.96 M/UL (ref 4.2–5.4)
SODIUM SERPL-SCNC: 139 MMOL/L (ref 135–145)
T4 FREE SERPL-MCNC: 1.72 NG/DL (ref 0.93–1.7)
TSH SERPL-ACNC: 2.78 UIU/ML (ref 0.38–5.33)
WBC # BLD AUTO: 5.9 K/UL (ref 4.8–10.8)

## 2025-06-24 PROCEDURE — 85025 COMPLETE CBC W/AUTO DIFF WBC: CPT

## 2025-06-24 PROCEDURE — 36415 COLL VENOUS BLD VENIPUNCTURE: CPT

## 2025-06-24 PROCEDURE — 3079F DIAST BP 80-89 MM HG: CPT | Performed by: NURSE PRACTITIONER

## 2025-06-24 PROCEDURE — 3075F SYST BP GE 130 - 139MM HG: CPT | Performed by: NURSE PRACTITIONER

## 2025-06-24 PROCEDURE — 82533 TOTAL CORTISOL: CPT

## 2025-06-24 PROCEDURE — 84443 ASSAY THYROID STIM HORMONE: CPT

## 2025-06-24 PROCEDURE — 84439 ASSAY OF FREE THYROXINE: CPT

## 2025-06-24 PROCEDURE — 99214 OFFICE O/P EST MOD 30 MIN: CPT | Performed by: NURSE PRACTITIONER

## 2025-06-24 PROCEDURE — 80053 COMPREHEN METABOLIC PANEL: CPT

## 2025-06-24 PROCEDURE — 83036 HEMOGLOBIN GLYCOSYLATED A1C: CPT

## 2025-06-24 ASSESSMENT — FIBROSIS 4 INDEX: FIB4 SCORE: 1.34

## 2025-06-24 NOTE — PATIENT INSTRUCTIONS
12.5-25 mg benadryl - diphenhydramine at night for drainage.      Start antibiotics by day 10-14 if you have thick green / yellow mucus and don't feel well with facial pain / intense pressure.

## 2025-06-24 NOTE — PROGRESS NOTES
Verbal consent was acquired by the patient to use [employer] ambient listening note generation during this visit Yes      Subjective   Jess Rojo is a 64 y.o. female who presents for f/u  History of Present Illness  The patient is a 64-year-old female who presents for follow-up.    She has been experiencing a productive cough with significant sinus drainage, which she attributes to allergies. The cough, accompanied by green-yellow mucus and facial pain, has persisted for approximately a week without improvement. She reports no fevers or chills but feels generally unwell. The drainage intensifies when she lies down, leading to severe coughing episodes that prevent her from lying down comfortably. She has been using Flonase, administering two sprays in each nostril daily, and an inhaler as needed. Despite these treatments, the cough recurred after a few months of relief. She also tried Claritin, which proved ineffective. She does not have Allegra or Zyrtec at home. She recalls taking Benadryl in her youth, which induced sleepiness. She has a history of sinus infections.    She has been informed of her snoring habit by her children and friends, which prompted her to consider a sleep study. She has been attempting to schedule an in-person sleep study at the sleep center on [facility] but has encountered difficulties. She spoke with two individuals over the phone more than two weeks ago regarding the sleep study but has not received any messages since 06/12/2025. She visited the sleep center in person and was informed that there was no referral for her. She was advised to request Ambien for the sleep study, but she is hesitant to take a new medication in an unfamiliar environment. She is considering using Tylenol PM instead.  Hx of CVA and paroxysmal atrial fibrillation    She is currently on a regimen of metoprolol 50 mg, taken at 5:00 PM. She reports feeling well upon waking but experiences fatigue around  "11:00 AM or 12:00 PM. She has a home blood pressure monitor.    She is due for her colonoscopy in 2030. Her mammogram is up to date. She will get her blood work done today. She has an appointment with Alisa in September. Her bowel movements are regular. She has been engaging in physical activity, specifically walking, and has noticed an improvement in her strength and speed. She can now walk a mile in 20 minutes and has achieved a distance of 2 miles in 15 minutes on the treadmill.    SOCIAL HISTORY  She does not drink alcohol.    Review of Systems  Neg except hpi  Objective   /80 (BP Location: Right arm, Patient Position: Sitting, BP Cuff Size: Large adult)   Pulse (!) 56   Temp (!) 35.7 °C (96.3 °F)   Resp 16   Ht 1.524 m (5')   Wt 77.6 kg (171 lb)   SpO2 94%   Physical Exam  Constitutional: Alert, no distress, plus 3 vital signs  Skin:  Warm, dry, no rashes invisible areas  Eye: Equal, round and reactive, conjunctiva clear  ENMT: Bilateral tympanic membranes are retracted but translucent without erythema or perforation. Positive bilateral maxillary sinus tenderness - \"mild\". Oropharynx is slightly injected without exudate. No tonsillar enlargement..    Neck: Mild anterior cervical, nontender lymphadenopathy  Respiratory: Unlabored respiration, lungs clear to auscultation, no wheezes, no rhonchi  Cardiovascular: Normal rate and rhythm, no murmur, no edema  Psych: Alert, pleasant, well-groomed, normal affect           Assessment & Plan  1. Allergic rhinitis  Possible early sinusitis.  Switch to 12.5 to 25 mg of Benadryl at night.  Continue Flonase 2 sprays each nostril daily.  Start antibiotics by day 10-14 if mucus is persistently green/yellow and sinus pressure persists, in particular if she begins to not feel well.  She will let me know how she is feeling in about 2 weeks, sooner with any worsening.    2.  Likely sleep apnea  She has going to get an in person sleep study scheduled.  I did message the " last referral specialist on her chart that tried to reach out to her to find out what is going on and we will get back in touch with her.    3.  History of paroxysmal atrial fibrillation and CVA: Chronically anticoagulated.  Rate controlled.  Has a follow-up with cardiology in September.  Will do labs today.  Feeling well.  In normal sinus rhythm.    4.  Hypertension: Controlled.  Continue 50 mg metoprolol.               Please note that this dictation was created using voice recognition software. I have made every reasonable attempt to correct obvious errors, but I expect that there are errors of grammar and possibly content that I did not discover before finalizing the note.

## 2025-06-25 ENCOUNTER — RESULTS FOLLOW-UP (OUTPATIENT)
Dept: MEDICAL GROUP | Facility: LAB | Age: 65
End: 2025-06-25

## 2025-07-01 DIAGNOSIS — G47.30 SLEEP APNEA, UNSPECIFIED TYPE: ICD-10-CM

## 2025-07-01 DIAGNOSIS — I48.91 ATRIAL FIBRILLATION WITH RVR (HCC): ICD-10-CM

## 2025-07-01 DIAGNOSIS — Z86.73 HISTORY OF STROKE: Primary | ICD-10-CM

## 2025-07-01 DIAGNOSIS — R06.83 SNORING: ICD-10-CM

## 2025-07-15 RX ORDER — LEVOTHYROXINE SODIUM 75 UG/1
TABLET ORAL
Qty: 90 TABLET | Refills: 0 | Status: SHIPPED | OUTPATIENT
Start: 2025-07-15